# Patient Record
Sex: MALE | Race: WHITE | Employment: OTHER | URBAN - METROPOLITAN AREA
[De-identification: names, ages, dates, MRNs, and addresses within clinical notes are randomized per-mention and may not be internally consistent; named-entity substitution may affect disease eponyms.]

---

## 2019-03-06 ENCOUNTER — HOSPITAL ENCOUNTER (INPATIENT)
Age: 74
LOS: 3 days | Discharge: HOME | DRG: 392 | End: 2019-03-09
Attending: STUDENT IN AN ORGANIZED HEALTH CARE EDUCATION/TRAINING PROGRAM | Admitting: FAMILY MEDICINE
Payer: MEDICARE

## 2019-03-06 DIAGNOSIS — K92.1 MELENA: Primary | ICD-10-CM

## 2019-03-06 DIAGNOSIS — I95.9 HYPOTENSION, UNSPECIFIED HYPOTENSION TYPE: ICD-10-CM

## 2019-03-06 PROBLEM — K92.2 LOWER GI BLEED: Status: ACTIVE | Noted: 2019-03-06

## 2019-03-06 LAB
ALBUMIN SERPL-MCNC: 3.3 G/DL (ref 3.2–4.6)
ALBUMIN/GLOB SERPL: 1 {RATIO} (ref 1.2–3.5)
ALP SERPL-CCNC: 52 U/L (ref 50–136)
ALT SERPL-CCNC: 16 U/L (ref 12–65)
ANION GAP SERPL CALC-SCNC: 8 MMOL/L (ref 7–16)
AST SERPL-CCNC: 22 U/L (ref 15–37)
BASOPHILS # BLD: 0.1 K/UL (ref 0–0.2)
BASOPHILS NFR BLD: 1 % (ref 0–2)
BILIRUB SERPL-MCNC: 0.4 MG/DL (ref 0.2–1.1)
BUN SERPL-MCNC: 22 MG/DL (ref 8–23)
CALCIUM SERPL-MCNC: 8.2 MG/DL (ref 8.3–10.4)
CHLORIDE SERPL-SCNC: 101 MMOL/L (ref 98–107)
CO2 SERPL-SCNC: 26 MMOL/L (ref 21–32)
CREAT SERPL-MCNC: 1.24 MG/DL (ref 0.8–1.5)
DIFFERENTIAL METHOD BLD: ABNORMAL
EOSINOPHIL # BLD: 0 K/UL (ref 0–0.8)
EOSINOPHIL NFR BLD: 0 % (ref 0.5–7.8)
ERYTHROCYTE [DISTWIDTH] IN BLOOD BY AUTOMATED COUNT: 12.7 % (ref 11.9–14.6)
EST. AVERAGE GLUCOSE BLD GHB EST-MCNC: 111 MG/DL
GLOBULIN SER CALC-MCNC: 3.4 G/DL (ref 2.3–3.5)
GLUCOSE SERPL-MCNC: 209 MG/DL (ref 65–100)
HBA1C MFR BLD: 5.5 % (ref 4.8–6)
HCT VFR BLD AUTO: 31.9 % (ref 41.1–50.3)
HCT VFR BLD AUTO: 36.4 % (ref 41.1–50.3)
HGB BLD-MCNC: 10.3 G/DL (ref 13.6–17.2)
HGB BLD-MCNC: 11.7 G/DL (ref 13.6–17.2)
IMM GRANULOCYTES # BLD AUTO: 0.1 K/UL (ref 0–0.5)
IMM GRANULOCYTES NFR BLD AUTO: 1 % (ref 0–5)
INR PPP: 1.1
LYMPHOCYTES # BLD: 1.2 K/UL (ref 0.5–4.6)
LYMPHOCYTES NFR BLD: 10 % (ref 13–44)
MCH RBC QN AUTO: 30.2 PG (ref 26.1–32.9)
MCHC RBC AUTO-ENTMCNC: 32.1 G/DL (ref 31.4–35)
MCV RBC AUTO: 93.8 FL (ref 79.6–97.8)
MONOCYTES # BLD: 0.4 K/UL (ref 0.1–1.3)
MONOCYTES NFR BLD: 4 % (ref 4–12)
NEUTS SEG # BLD: 9.9 K/UL (ref 1.7–8.2)
NEUTS SEG NFR BLD: 85 % (ref 43–78)
NRBC # BLD: 0 K/UL (ref 0–0.2)
PLATELET # BLD AUTO: 189 K/UL (ref 150–450)
PMV BLD AUTO: 10.3 FL (ref 9.4–12.3)
POTASSIUM SERPL-SCNC: 4.3 MMOL/L (ref 3.5–5.1)
PROT SERPL-MCNC: 6.7 G/DL (ref 6.3–8.2)
PROTHROMBIN TIME: 14.4 SEC (ref 11.7–14.5)
RBC # BLD AUTO: 3.88 M/UL (ref 4.23–5.6)
SODIUM SERPL-SCNC: 135 MMOL/L (ref 136–145)
WBC # BLD AUTO: 11.7 K/UL (ref 4.3–11.1)

## 2019-03-06 PROCEDURE — 77030032490 HC SLV COMPR SCD KNE COVD -B

## 2019-03-06 PROCEDURE — 36415 COLL VENOUS BLD VENIPUNCTURE: CPT

## 2019-03-06 PROCEDURE — 74011000250 HC RX REV CODE- 250: Performed by: FAMILY MEDICINE

## 2019-03-06 PROCEDURE — 74011250636 HC RX REV CODE- 250/636: Performed by: FAMILY MEDICINE

## 2019-03-06 PROCEDURE — 86923 COMPATIBILITY TEST ELECTRIC: CPT

## 2019-03-06 PROCEDURE — 85610 PROTHROMBIN TIME: CPT

## 2019-03-06 PROCEDURE — 74011250637 HC RX REV CODE- 250/637: Performed by: FAMILY MEDICINE

## 2019-03-06 PROCEDURE — 85025 COMPLETE CBC W/AUTO DIFF WBC: CPT

## 2019-03-06 PROCEDURE — 80053 COMPREHEN METABOLIC PANEL: CPT

## 2019-03-06 PROCEDURE — 96360 HYDRATION IV INFUSION INIT: CPT | Performed by: STUDENT IN AN ORGANIZED HEALTH CARE EDUCATION/TRAINING PROGRAM

## 2019-03-06 PROCEDURE — 74011250636 HC RX REV CODE- 250/636: Performed by: EMERGENCY MEDICINE

## 2019-03-06 PROCEDURE — 83036 HEMOGLOBIN GLYCOSYLATED A1C: CPT

## 2019-03-06 PROCEDURE — 74011250636 HC RX REV CODE- 250/636: Performed by: STUDENT IN AN ORGANIZED HEALTH CARE EDUCATION/TRAINING PROGRAM

## 2019-03-06 PROCEDURE — 96361 HYDRATE IV INFUSION ADD-ON: CPT | Performed by: STUDENT IN AN ORGANIZED HEALTH CARE EDUCATION/TRAINING PROGRAM

## 2019-03-06 PROCEDURE — 93005 ELECTROCARDIOGRAM TRACING: CPT | Performed by: STUDENT IN AN ORGANIZED HEALTH CARE EDUCATION/TRAINING PROGRAM

## 2019-03-06 PROCEDURE — 86900 BLOOD TYPING SEROLOGIC ABO: CPT

## 2019-03-06 PROCEDURE — 77030020263 HC SOL INJ SOD CL0.9% LFCR 1000ML

## 2019-03-06 PROCEDURE — 65270000029 HC RM PRIVATE

## 2019-03-06 PROCEDURE — C9113 INJ PANTOPRAZOLE SODIUM, VIA: HCPCS | Performed by: FAMILY MEDICINE

## 2019-03-06 PROCEDURE — 85018 HEMOGLOBIN: CPT

## 2019-03-06 PROCEDURE — 99284 EMERGENCY DEPT VISIT MOD MDM: CPT | Performed by: STUDENT IN AN ORGANIZED HEALTH CARE EDUCATION/TRAINING PROGRAM

## 2019-03-06 RX ORDER — SODIUM CHLORIDE 9 MG/ML
100 INJECTION, SOLUTION INTRAVENOUS CONTINUOUS
Status: DISCONTINUED | OUTPATIENT
Start: 2019-03-06 | End: 2019-03-09 | Stop reason: HOSPADM

## 2019-03-06 RX ORDER — ONDANSETRON 2 MG/ML
4 INJECTION INTRAMUSCULAR; INTRAVENOUS
Status: DISCONTINUED | OUTPATIENT
Start: 2019-03-06 | End: 2019-03-09 | Stop reason: HOSPADM

## 2019-03-06 RX ORDER — SODIUM CHLORIDE 0.9 % (FLUSH) 0.9 %
5-40 SYRINGE (ML) INJECTION AS NEEDED
Status: DISCONTINUED | OUTPATIENT
Start: 2019-03-06 | End: 2019-03-09 | Stop reason: HOSPADM

## 2019-03-06 RX ORDER — PRAVASTATIN SODIUM 80 MG/1
80 TABLET ORAL
Status: DISCONTINUED | OUTPATIENT
Start: 2019-03-06 | End: 2019-03-09 | Stop reason: HOSPADM

## 2019-03-06 RX ORDER — SODIUM CHLORIDE 0.9 % (FLUSH) 0.9 %
5-40 SYRINGE (ML) INJECTION EVERY 8 HOURS
Status: DISCONTINUED | OUTPATIENT
Start: 2019-03-06 | End: 2019-03-09 | Stop reason: HOSPADM

## 2019-03-06 RX ORDER — SODIUM CHLORIDE 9 MG/ML
500 INJECTION, SOLUTION INTRAVENOUS ONCE
Status: COMPLETED | OUTPATIENT
Start: 2019-03-06 | End: 2019-03-06

## 2019-03-06 RX ADMIN — SODIUM CHLORIDE 1000 ML: 900 INJECTION, SOLUTION INTRAVENOUS at 16:59

## 2019-03-06 RX ADMIN — SODIUM CHLORIDE 500 ML: 900 INJECTION, SOLUTION INTRAVENOUS at 18:39

## 2019-03-06 RX ADMIN — SODIUM CHLORIDE 100 ML/HR: 900 INJECTION, SOLUTION INTRAVENOUS at 21:00

## 2019-03-06 RX ADMIN — SODIUM CHLORIDE 40 MG: 9 INJECTION, SOLUTION INTRAMUSCULAR; INTRAVENOUS; SUBCUTANEOUS at 22:00

## 2019-03-06 RX ADMIN — Medication 10 ML: at 22:02

## 2019-03-06 RX ADMIN — PRAVASTATIN SODIUM 80 MG: 80 TABLET ORAL at 22:00

## 2019-03-06 NOTE — ED PROVIDER NOTES
75-year-old male patient presents with reports of rectal bleeding. Patient states he had a normal bowel movement this morning, states he developed some weakness shortly thereafter worsening over several hours. He then states he had a large, loose bowel movement that was filled with clot-like material and dark stool. Patient states he had several episodes of similar stooling following this initial episode. He denies syncopal episode but reports lightheadedness and weakness. No significantshortness of breath or pain. He denies previously similar symptoms. Patient reports a history of heart surgery for aortic stenosis, hypertension. He takes an aspirin daily but denies any other blood thinner therapy.              Past Medical History:   Diagnosis Date    Actinic keratosis     Actinic keratosis     CAD (coronary artery disease)     CABG ,bovine valve    Cancer (HCC)     Cellulitis and abscess of leg, except foot     Disorders of magnesium metabolism     Essential hypertension, benign     Heart failure ([address]     MI 2013    High cholesterol     History of prostate cancer     [facility]par[facility], unspecified [facility]     Hypertension     Mixed hyperlipidemia     Neoplasm of uncertain behavior of skin     S/P AVR (aortic valve replacement)     Scabies        Past Surgical History:   Procedure Laterality Date    CARDIAC SURG PROCEDURE UNLIST      HX CORONARY ARTERY BYPASS GRAFT      x4, vein harvest    HX HEENT      partial thyroid     HX HERNIA REPAIR  12/2014    HX KNEE ARTHROSCOPY Left     HX ORTHOPAEDIC      [place] [id number] urology         Family History:   Problem Relation Age of Onset    Diabetes Mother     Heart Disease Father         CVD/athrosclerotic       Social History     Socioeconomic History    Marital status:      Spouse name: Not on file    Number of children: Not on file    Years of education: Not on file    Highest education level: Not on file Social Needs    Financial resource strain: Not on file    Food insecurity - worry: Not on file    Food insecurity - inability: Not on file   [employer] needs - medical: Not on file   [employer] needs - non-medical: Not on file   Occupational History    Not on file   Tobacco Use    Smoking status: Never Smoker    Smokeless tobacco: Current User     Types: Chew    Tobacco comment: 1 pack per 3 weeks   Substance and Sexual Activity    Alcohol use: Yes     Comment: 1-3 beers per year    Drug use: No    Sexual activity: Not on file   Other Topics Concern    Not on file   Social History Narrative    Not on file         ALLERGIES: Pcn [penicillins]    Review of Systems   Constitutional: Negative for chills, diaphoresis and fever. HENT: Negative for congestion, sneezing and sore throat. Eyes: Negative for visual disturbance. Respiratory: Negative for cough, chest tightness, shortness of breath and wheezing. Cardiovascular: Negative for chest pain and leg swelling. Gastrointestinal: Negative for abdominal pain, blood in stool, diarrhea, nausea and vomiting. Endocrine: Negative for polyuria. Genitourinary: Negative for difficulty urinating, dysuria, flank pain, hematuria and urgency. Musculoskeletal: Negative for back pain, myalgias, neck pain and neck stiffness. Skin: Negative for color change and rash. Neurological: Negative for dizziness, syncope, speech difficulty, weakness, light-headedness, numbness and headaches. Psychiatric/Behavioral: Negative for behavioral problems. All other systems reviewed and are negative. Vitals:    03/06/19 1651 03/06/19 1743   BP: (!) 80/51 90/67   Pulse: 84 (!) 50   Resp: 16 16   SpO2: 96% 100%   Height: 5' 9\" (1.753 m)             Physical Exam   Constitutional: He is oriented to person, place, and time. He appears well-developed and well-nourished. No distress. Alert and oriented to person place and time.   No acute distress, speaks in clear, fluid sentences. HENT:   Head: Normocephalic and atraumatic. Right Ear: External ear normal.   Left Ear: External ear normal.   Nose: Nose normal.   Eyes: EOM are normal. Pupils are equal, round, and reactive to light. Neck: Normal range of motion. Cardiovascular: Normal rate, regular rhythm, normal heart sounds and intact distal pulses. Exam reveals no gallop and no friction rub. No murmur heard. Pulmonary/Chest: Effort normal and breath sounds normal. No respiratory distress. He has no wheezes. He has no rales. He exhibits no tenderness. Abdominal: Soft. He exhibits no distension and no mass. There is no tenderness. There is no rebound and no guarding. No hernia. No focal findings. No distention, rebound or guarding. And flat to palpation. Genitourinary:   Genitourinary Comments: Rectal exam reveals obvious melena, Hemoccult-positive, no  visible source of bleeding present. Musculoskeletal: Normal range of motion. He exhibits no edema, tenderness or deformity. Neurological: He is alert and oriented to person, place, and time. No cranial nerve deficit. Skin: Skin is warm and dry. He is not diaphoretic. Nursing note and vitals reviewed. MDM  Number of Diagnoses or Management Options  Hypotension, unspecified hypotension type: new and requires workup  Melena: new and requires workup  Diagnosis management comments: EKG obtained on arrival shows a sinus bradycardia with a rate of 48 beats a minute. No evidence of acute ischemic change. Hemoccult positive on exam, hemoglobin stable on blood draw however will speak with gastroenterology as patient likely requires observation and continued hemoglobin checks with plans for colonoscopy to identify the source of his bleeding. Spoke with on-call GI specialist who requests that I speak with the hospitalist for admission, agrees that patient needs to stay for continued hemoglobin checks and colonoscopy.  7:15 PM  Hospitalist agrees to evaluate for admission 7:19 PM           Amount and/or Complexity of Data Reviewed  Clinical lab tests: ordered and reviewed  Tests in the medicine section of CPT®: ordered and reviewed  Discuss the patient with other providers: yes    Risk of Complications, Morbidity, and/or Mortality  Presenting problems: moderate  Diagnostic procedures: moderate  Management options: moderate    Patient Progress  Patient progress: stable         Procedures

## 2019-03-06 NOTE — ED TRIAGE NOTES
Patient states he started having diarrhea around noon today and it had a large amount of dark red blood in it. Patient states he takes an 81 mg aspirin daily but denies taking any blood thinners.  Patient states he feels like he is weak all over and dizzy

## 2019-03-07 ENCOUNTER — ANESTHESIA EVENT (OUTPATIENT)
Dept: ENDOSCOPY | Age: 74
DRG: 392 | End: 2019-03-07
Payer: MEDICARE

## 2019-03-07 ENCOUNTER — ANESTHESIA (OUTPATIENT)
Dept: ENDOSCOPY | Age: 74
DRG: 392 | End: 2019-03-07
Payer: MEDICARE

## 2019-03-07 LAB
ANION GAP SERPL CALC-SCNC: 5 MMOL/L (ref 7–16)
ATRIAL RATE: 48 BPM
BUN SERPL-MCNC: 14 MG/DL (ref 8–23)
CALCIUM SERPL-MCNC: 7.5 MG/DL (ref 8.3–10.4)
CALCULATED P AXIS, ECG09: 163 DEGREES
CALCULATED R AXIS, ECG10: 96 DEGREES
CALCULATED T AXIS, ECG11: -10 DEGREES
CHLORIDE SERPL-SCNC: 111 MMOL/L (ref 98–107)
CO2 SERPL-SCNC: 27 MMOL/L (ref 21–32)
CREAT SERPL-MCNC: 0.92 MG/DL (ref 0.8–1.5)
DIAGNOSIS, 93000: NORMAL
ERYTHROCYTE [DISTWIDTH] IN BLOOD BY AUTOMATED COUNT: 12.7 % (ref 11.9–14.6)
GLUCOSE SERPL-MCNC: 91 MG/DL (ref 65–100)
HCT VFR BLD AUTO: 25.7 % (ref 41.1–50.3)
HCT VFR BLD AUTO: 26.8 % (ref 41.1–50.3)
HCT VFR BLD AUTO: 26.9 % (ref 41.1–50.3)
HGB BLD-MCNC: 8.1 G/DL (ref 13.6–17.2)
HGB BLD-MCNC: 8.6 G/DL (ref 13.6–17.2)
HGB BLD-MCNC: 8.6 G/DL (ref 13.6–17.2)
MAGNESIUM SERPL-MCNC: 1.8 MG/DL (ref 1.8–2.4)
MCH RBC QN AUTO: 30 PG (ref 26.1–32.9)
MCHC RBC AUTO-ENTMCNC: 32 G/DL (ref 31.4–35)
MCV RBC AUTO: 93.7 FL (ref 79.6–97.8)
NRBC # BLD: 0 K/UL (ref 0–0.2)
P-R INTERVAL, ECG05: 216 MS
PLATELET # BLD AUTO: 130 K/UL (ref 150–450)
PMV BLD AUTO: 10.4 FL (ref 9.4–12.3)
POTASSIUM SERPL-SCNC: 4 MMOL/L (ref 3.5–5.1)
Q-T INTERVAL, ECG07: 482 MS
QRS DURATION, ECG06: 116 MS
QTC CALCULATION (BEZET), ECG08: 430 MS
RBC # BLD AUTO: 2.87 M/UL (ref 4.23–5.6)
SODIUM SERPL-SCNC: 143 MMOL/L (ref 136–145)
VENTRICULAR RATE, ECG03: 48 BPM
WBC # BLD AUTO: 5.9 K/UL (ref 4.3–11.1)

## 2019-03-07 PROCEDURE — 74011250636 HC RX REV CODE- 250/636: Performed by: ANESTHESIOLOGY

## 2019-03-07 PROCEDURE — 74011250636 HC RX REV CODE- 250/636: Performed by: FAMILY MEDICINE

## 2019-03-07 PROCEDURE — 76040000025: Performed by: INTERNAL MEDICINE

## 2019-03-07 PROCEDURE — 74011250636 HC RX REV CODE- 250/636

## 2019-03-07 PROCEDURE — 65660000000 HC RM CCU STEPDOWN

## 2019-03-07 PROCEDURE — 36415 COLL VENOUS BLD VENIPUNCTURE: CPT

## 2019-03-07 PROCEDURE — 74011000250 HC RX REV CODE- 250: Performed by: FAMILY MEDICINE

## 2019-03-07 PROCEDURE — 74011250637 HC RX REV CODE- 250/637: Performed by: INTERNAL MEDICINE

## 2019-03-07 PROCEDURE — C9113 INJ PANTOPRAZOLE SODIUM, VIA: HCPCS | Performed by: FAMILY MEDICINE

## 2019-03-07 PROCEDURE — 77030020263 HC SOL INJ SOD CL0.9% LFCR 1000ML

## 2019-03-07 PROCEDURE — 76060000031 HC ANESTHESIA FIRST 0.5 HR: Performed by: INTERNAL MEDICINE

## 2019-03-07 PROCEDURE — 85018 HEMOGLOBIN: CPT

## 2019-03-07 PROCEDURE — 80048 BASIC METABOLIC PNL TOTAL CA: CPT

## 2019-03-07 PROCEDURE — 83735 ASSAY OF MAGNESIUM: CPT

## 2019-03-07 PROCEDURE — 0DJ08ZZ INSPECTION OF UPPER INTESTINAL TRACT, VIA NATURAL OR ARTIFICIAL OPENING ENDOSCOPIC: ICD-10-PCS | Performed by: INTERNAL MEDICINE

## 2019-03-07 PROCEDURE — 74011250637 HC RX REV CODE- 250/637: Performed by: FAMILY MEDICINE

## 2019-03-07 PROCEDURE — 85027 COMPLETE CBC AUTOMATED: CPT

## 2019-03-07 RX ORDER — PROPOFOL 10 MG/ML
INJECTION, EMULSION INTRAVENOUS AS NEEDED
Status: DISCONTINUED | OUTPATIENT
Start: 2019-03-07 | End: 2019-03-07 | Stop reason: HOSPADM

## 2019-03-07 RX ORDER — SODIUM CHLORIDE, SODIUM LACTATE, POTASSIUM CHLORIDE, CALCIUM CHLORIDE 600; 310; 30; 20 MG/100ML; MG/100ML; MG/100ML; MG/100ML
1000 INJECTION, SOLUTION INTRAVENOUS CONTINUOUS
Status: DISCONTINUED | OUTPATIENT
Start: 2019-03-07 | End: 2019-03-07 | Stop reason: HOSPADM

## 2019-03-07 RX ORDER — LIDOCAINE HYDROCHLORIDE 20 MG/ML
INJECTION, SOLUTION EPIDURAL; INFILTRATION; INTRACAUDAL; PERINEURAL AS NEEDED
Status: DISCONTINUED | OUTPATIENT
Start: 2019-03-07 | End: 2019-03-07 | Stop reason: HOSPADM

## 2019-03-07 RX ORDER — POLYETHYLENE GLYCOL 3350 17 G/17G
238 POWDER, FOR SOLUTION ORAL ONCE
Status: COMPLETED | OUTPATIENT
Start: 2019-03-07 | End: 2019-03-07

## 2019-03-07 RX ORDER — BISACODYL 5 MG
20 TABLET, DELAYED RELEASE (ENTERIC COATED) ORAL ONCE
Status: COMPLETED | OUTPATIENT
Start: 2019-03-07 | End: 2019-03-07

## 2019-03-07 RX ADMIN — SODIUM CHLORIDE 40 MG: 9 INJECTION, SOLUTION INTRAMUSCULAR; INTRAVENOUS; SUBCUTANEOUS at 21:23

## 2019-03-07 RX ADMIN — PROPOFOL 50 MG: 10 INJECTION, EMULSION INTRAVENOUS at 12:49

## 2019-03-07 RX ADMIN — Medication 10 ML: at 06:00

## 2019-03-07 RX ADMIN — PROPOFOL 50 MG: 10 INJECTION, EMULSION INTRAVENOUS at 12:47

## 2019-03-07 RX ADMIN — POLYETHYLENE GLYCOL 3350 238 G: 17 POWDER, FOR SOLUTION ORAL at 18:13

## 2019-03-07 RX ADMIN — Medication 10 ML: at 21:25

## 2019-03-07 RX ADMIN — SODIUM CHLORIDE 40 MG: 9 INJECTION, SOLUTION INTRAMUSCULAR; INTRAVENOUS; SUBCUTANEOUS at 08:35

## 2019-03-07 RX ADMIN — SODIUM CHLORIDE, SODIUM LACTATE, POTASSIUM CHLORIDE, AND CALCIUM CHLORIDE 1000 ML: 600; 310; 30; 20 INJECTION, SOLUTION INTRAVENOUS at 12:39

## 2019-03-07 RX ADMIN — SODIUM CHLORIDE 100 ML/HR: 900 INJECTION, SOLUTION INTRAVENOUS at 11:11

## 2019-03-07 RX ADMIN — SODIUM CHLORIDE 1000 ML: 900 INJECTION, SOLUTION INTRAVENOUS at 04:12

## 2019-03-07 RX ADMIN — SODIUM CHLORIDE 100 ML/HR: 900 INJECTION, SOLUTION INTRAVENOUS at 23:46

## 2019-03-07 RX ADMIN — Medication 10 ML: at 15:13

## 2019-03-07 RX ADMIN — BISACODYL 20 MG: 5 TABLET, COATED ORAL at 15:13

## 2019-03-07 RX ADMIN — LIDOCAINE HYDROCHLORIDE 80 MG: 20 INJECTION, SOLUTION EPIDURAL; INFILTRATION; INTRACAUDAL; PERINEURAL at 12:47

## 2019-03-07 RX ADMIN — PRAVASTATIN SODIUM 80 MG: 80 TABLET ORAL at 21:23

## 2019-03-07 NOTE — ANESTHESIA POSTPROCEDURE EVALUATION
Procedure(s):  ESOPHAGOGASTRODUODENOSCOPY (EGD).     Anesthesia Post Evaluation      Multimodal analgesia: multimodal analgesia not used between 6 hours prior to anesthesia start to PACU discharge  Patient location during evaluation: bedside  Patient participation: complete - patient participated  Level of consciousness: awake and alert  Pain management: adequate  Airway patency: patent  Anesthetic complications: no  Cardiovascular status: hemodynamically stable  Respiratory status: acceptable  Hydration status: acceptable        Visit Vitals  /66   Pulse 61   Temp 37 °C (98.6 °F)   Resp 16   Ht 5' 9\" (1.753 m)   SpO2 96%   BMI 23.18 kg/m²

## 2019-03-07 NOTE — H&P
HOSPITALIST H&P/CONSULT  NAME:  Brooks Herman   Age:  74 y.o.  :   1945   MRN:   [id number]  PCP: Sonido Pinzon  Consulting MD:  Treatment Team: Attending Provider: Peg Speaker; Primary Nurse: Dwight Galeano Primary Nurse: Theodoro Schilder, RN  HPI:   Patient is a 73yo M with hx HTN, HLD, Aortic stenosis s/p bovine valve replacement presents with weakness and large bloody BMs. Pt was seen at PCP 2w prior for n/v, treated symptomatically for viral gastroenteritis and improved fully. Today, pt started with some loose stools and general weakness/fatigue then had a large blood and clots bowel movement. Has had several more loose and bloody BMs since then. Has occasionally passed small amount of BRB, but nothing like this. Has never hat it evaluated, has never had a colonoscopy. No blood thinners, but takes baby asa. No abdominal pain, no sob, no fevers. Complete ROS done and is as stated in HPI or otherwise negative  Past Medical History:   Diagnosis Date    Actinic keratosis     Actinic keratosis     CAD (coronary artery disease)     CABG ,bovine valve    Cancer (HCC)     Cellulitis and abscess of leg, except foot     Disorders of magnesium metabolism     Essential hypertension, benign     Heart failure ([address]     MI     High cholesterol     History of prostate cancer     Hyperparathyroidism, unspecified ([address]     Hypertension     Mixed hyperlipidemia     Neoplasm of uncertain behavior of skin     S/P AVR (aortic valve replacement)     Scabies       Past Surgical History:   Procedure Laterality Date    CARDIAC SURG PROCEDURE UNLIST      HX CORONARY ARTERY BYPASS GRAFT      x4, vein harvest    HX HEENT      partial thyroid     HX HERNIA REPAIR  2014    HX KNEE ARTHROSCOPY Left     HX ORTHOPAEDIC      [facility] urology    reviewed   Prior to Admission Medications   Prescriptions Last Dose Informant Patient Reported? Taking? GLUCOSAMINE/CHONDROITIN SULF A (GLUCOSAMINE-CHONDROITIN PO)   Yes No   Sig: Take 1 capsule by mouth two (2) times a day. Stop seven days prior to surgery per anesthesia protocol. aspirin delayed-release 81 mg tablet   Yes No   Sig: Take 81 mg by mouth every morning. Take day of surgery per anesthesia protocol. calcium 500 mg tab   Yes No   Sig: Take 1,500 mg by mouth two (2) times a day. Stop seven days prior to surgery per anesthesia protocol. carvedilol (COREG) 6.25 mg tablet   No No   Sig: Take 0.5 Tabs by mouth two (2) times a day. The pt states he takes 1/2 pill BID   krill-omega-3-dha-epa-lipids (KRILL OIL) 790-48-94-50 mg cap   Yes No   Sig: Take 1 capsule by mouth every morning. Stop seven days prior to surgery per anesthesia protocol. ondansetron (ZOFRAN ODT) 4 mg disintegrating tablet   No No   Sig: Take 1 Tab by mouth every eight (8) hours as needed for Nausea. pravastatin (PRAVACHOL) 80 mg tablet   No No   Sig: Take 1 Tab by mouth nightly. Indications: hypercholesterolemia, mixed hyperlipidemia   ramipril (ALTACE) 5 mg capsule   No No   Sig: Take 1 Cap by mouth daily.       Facility-Administered Medications: None     Allergies   Allergen Reactions    Pcn [Penicillins] Rash and Other (comments)     Fever, joint pain, dizzy        Social History     Tobacco Use    Smoking status: Never Smoker    Smokeless tobacco: Current User     Types: Chew    Tobacco comment: 1 pack per 3 weeks   Substance Use Topics    Alcohol use: Yes     Comment: 1-3 beers per year      Family History   Problem Relation Age of Onset    Diabetes Mother     Heart Disease Father         CVD/athrosclerotic    reviewed  Objective:     Visit Vitals  BP (!) 89/58   Pulse 63   Temp 97.6 °F (36.4 °C)   Resp (!) 50   Ht 5' 9\" (1.753 m)   SpO2 100%   BMI 23.18 kg/m²      Temp (24hrs), Av.6 °F (36.4 °C), Min:97.6 °F (36.4 °C), Max:97.6 °F (36.4 °C)    Oxygen Therapy  O2 Sat (%): 100 % (19)  Pulse via Oximetry: 61 beats per minute (03/06/19 2003)  O2 Device: Room air (03/06/19 1926)  Physical Exam:  General:    Alert, cooperative, no distress, appears stated age. Head:   Normocephalic, without obvious abnormality, atraumatic. Nose:  Nares normal. No drainage or sinus tenderness. Lungs:   Clear to auscultation bilaterally. No Wheezing or Rhonchi. No rales. Heart:   Borderline lupe, systolic murmur  Abdomen:   Soft, non-tender. Not distended. Bowel sounds hyperactive. Extremities: No cyanosis. No edema. No clubbing  Skin:     Texture, turgor normal. No rashes or lesions. Not Jaundiced  Neurologic: Alert and oriented x 3, no focal deficits   Data Review:   Recent Results (from the past 24 hour(s))   CBC WITH AUTOMATED DIFF    Collection Time: 03/06/19  4:59 PM   Result Value Ref Range    WBC 11.7 (H) 4.3 - 11.1 K/uL    RBC 3.88 (L) 4.23 - 5.6 M/uL    HGB 11.7 (L) 13.6 - 17.2 g/dL    HCT 36.4 (L) 41.1 - 50.3 %    MCV 93.8 79.6 - 97.8 FL    MCH 30.2 26.1 - 32.9 PG    MCHC 32.1 31.4 - 35.0 g/dL    RDW 12.7 11.9 - 14.6 %    PLATELET 944 310 - 468 K/uL    MPV 10.3 9.4 - 12.3 FL    ABSOLUTE NRBC 0.00 0.0 - 0.2 K/uL    DF AUTOMATED      NEUTROPHILS 85 (H) 43 - 78 %    LYMPHOCYTES 10 (L) 13 - 44 %    MONOCYTES 4 4.0 - 12.0 %    EOSINOPHILS 0 (L) 0.5 - 7.8 %    BASOPHILS 1 0.0 - 2.0 %    IMMATURE GRANULOCYTES 1 0.0 - 5.0 %    ABS. NEUTROPHILS 9.9 (H) 1.7 - 8.2 K/UL    ABS. LYMPHOCYTES 1.2 0.5 - 4.6 K/UL    ABS. MONOCYTES 0.4 0.1 - 1.3 K/UL    ABS. EOSINOPHILS 0.0 0.0 - 0.8 K/UL    ABS. BASOPHILS 0.1 0.0 - 0.2 K/UL    ABS. IMM.  GRANS. 0.1 0.0 - 0.5 K/UL   METABOLIC PANEL, COMPREHENSIVE    Collection Time: 03/06/19  4:59 PM   Result Value Ref Range    Sodium 135 (L) 136 - 145 mmol/L    Potassium 4.3 3.5 - 5.1 mmol/L    Chloride 101 98 - 107 mmol/L    CO2 26 21 - 32 mmol/L    Anion gap 8 7 - 16 mmol/L    Glucose 209 (H) 65 - 100 mg/dL    BUN 22 8 - 23 MG/DL    Creatinine 1.24 0.8 - 1.5 MG/DL    GFR est AA >60 >60 ml/min/1.73m2    GFR est non-AA >60 >60 ml/min/1.73m2    Calcium 8.2 (L) 8.3 - 10.4 MG/DL    Bilirubin, total 0.4 0.2 - 1.1 MG/DL    ALT (SGPT) 16 12 - 65 U/L    AST (SGOT) 22 15 - 37 U/L    Alk. phosphatase 52 50 - 136 U/L    Protein, total 6.7 6.3 - 8.2 g/dL    Albumin 3.3 3.2 - 4.6 g/dL    Globulin 3.4 2.3 - 3.5 g/dL    A-G Ratio 1.0 (L) 1.2 - 3.5     TYPE & SCREEN    Collection Time: 03/06/19  4:59 PM   Result Value Ref Range    Crossmatch Expiration 03/09/2019     ABO/Rh(D) A POSITIVE     Antibody screen NEG    EKG, 12 LEAD, INITIAL    Collection Time: 03/06/19  6:36 PM   Result Value Ref Range    Ventricular Rate 48 BPM    Atrial Rate 48 BPM    P-R Interval 216 ms    QRS Duration 116 ms    Q-T Interval 482 ms    QTC Calculation (Bezet) 430 ms    Calculated P Axis 163 degrees    Calculated R Axis 96 degrees    Calculated T Axis -10 degrees    Diagnosis       !! AGE AND GENDER SPECIFIC ECG ANALYSIS !!  Unusual P axis, possible ectopic atrial bradycardia  Rightward axis  Inferior infarct , age undetermined  Cannot rule out Anterior infarct , age undetermined  Abnormal ECG  When compared with ECG of 17-DEC-2001 17:04,  Ectopic atrial rhythm has replaced Sinus rhythm  Minimal criteria for Anterior infarct are now Present  ST now depressed in Inferior leads  T wave inversion now evident in Inferior leads       Imaging /Procedures /Studies   No orders to display       Assessment and Plan: Active Hospital Problems    Diagnosis Date Noted    Lower GI bleed 03/06/2019    Mixed hyperlipidemia     Essential hypertension, benign        PLAN:  Lower GI bleed, never had colonoscopy. HBG down from 13.7 to 11.7. Hypotension improved with fluids. Hold antihypertensives.  NPO after midnight and consult GI for likely colonoscopy in AM. Hold ASA  Glucose 209, check A1c      DVT PPx: SCD  Code Status: DNR    Anticipated discharge: 3-4d    Signed By: Thiago Willis MD     March 6, 2019

## 2019-03-07 NOTE — PROGRESS NOTES
03/06/19 2054   Dual Skin Pressure Injury Assessment   Dual Skin Pressure Injury Assessment WDL   Second Care Provider (Based on Facility Policy) Alexia  RN   Skin Integumentary   Skin Integumentary (WDL) WDL   Skin Color Appropriate for ethnicity   Skin Condition/Temp Dry; Warm   Skin Integrity Tattoos (comment)  (Rt shoulder)   Turgor Non-tenting   Hair Growth Present   Varicosities Absent   Musculoskeletal   Musculoskeletal (WDL) WDL

## 2019-03-07 NOTE — PROGRESS NOTES
Patient is a 74year old white male who is currently admitted under inpatient status due to melena. GI has been consulted. Pending EGD today. Currently being monitored for H&H. CM met with patient and spouse in room regarding discharge planning. SOCIAL:  Patient and spouse live in a 1 level house with 3 step entry and shower/tub. 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  His wife also works part time cleaning houses. His 2 children and their spouses live nearby and all work full time. He is not a . His PCP is confirmed as Dr. Nathen Henriquez. Last visit 2 weeks ago, per H&P. His insurance is confirmed as JAYS. His prescriptions are typically affordable, per patient and spouse. Patient's POA is his wife. Documents on file. Source of income: employed / social security  No reported history of mental health or substance abuse issues. MOBILITY / HISTORY:  At baseline, patient is very active. He reports exercising 3 days per week. He ambulates without devices. He manages ADLs with independence. He drives himself. Family also available to transport if needed. DME - cane, crutches, BSC  No home oxygen. No dialysis history. No privately paid sitters, aids, caregivers, or CLTC hours. No STR history[facility] after [facility] procedure. PLAN FOR DISCHARGE:  Anticipate patient will discharg to home when medically ready. No needs identified. Strong family support available to patient. [place] will continue to follow for any potential needs during this admission. Care Management Interventions  PCP Verified by CM: Yes(Dr. Niesha Velez)  Last Visit to PCP: 02/20/19  Mode of Transport at Discharge:  Other (see comment)(wife)  Transition of Care Consult (CM Consult): Discharge Planning  Discharge Durable Medical Equipment: No(BSC, cane, and crutches at home.)  Physical Therapy Consult: No  Occupational Therapy Consult: No  Speech Therapy Consult: No  Current Support Network: Own Home, Lives with Spouse(Lives with wife. 2 children and their spouses live locally. All work.)  Confirm Follow Up Transport: Family(Patient typically drives himself.  Family also available if needed.)  Plan discussed with Pt/Family/Caregiver: Yes(Met with patient and spouse.)  Freedom of Choice Offered: Yes  Discharge Location  Discharge Placement: Home

## 2019-03-07 NOTE — PROGRESS NOTES
TRANSFER - IN REPORT:    Verbal report received from [facility] RN(name) on [address]  being received from 720(unit) for ordered procedure      Report consisted of patients Situation, Background, Assessment and   Recommendations(SBAR). Information from the following report(s) SBAR and MAR was reviewed with the receiving nurse. Opportunity for questions and clarification was provided. Nurse confirming with patient about no blood products.

## 2019-03-07 NOTE — ROUTINE PROCESS
Notified Dr. Denver Gower and Dr. Morgan Cox of pt blood pressure of 80/55 with MAP 63. O2 96. Patient alert and oriented and asymptomatic. 1L of NS 250ml/hr ordered. Will continue to monitor.

## 2019-03-07 NOTE — ED NOTES
TRANSFER - OUT REPORT:    Verbal report given to Toribio(name) on [address]  being transferred to [facility](unit) for routine progression of care       Report consisted of patients Situation, Background, Assessment and   Recommendations(SBAR). Information from the following report(s) SBAR was reviewed with the receiving nurse. Lines:   Peripheral IV 03/06/19 Left Antecubital (Active)   Site Assessment Clean, dry, & intact 3/6/2019  7:28 PM   Phlebitis Assessment 0 3/6/2019  7:28 PM   Infiltration Assessment 0 3/6/2019  7:28 PM   Dressing Status Clean, dry, & intact 3/6/2019  7:28 PM        Opportunity for questions and clarification was provided.       Patient transported with:   [employer]

## 2019-03-07 NOTE — H&P (VIEW-ONLY)
[facility] Consult Note         Referring Provider:  Dr Arvizu Current Date:  3/7/2019    Admit Date:  3/6/2019    Chief Complaint:      Subjective:     History of Present Illness:  Patient is a 74 y.o. male with PMH of CAD (CABG 2013) HTN, HLD, Aortic stenosis s/p bovine valve replacement and prostate cancer seen for dark stool and anemia with hgb drop from 13.7 to 8.6 with no elevation in BUN. Patient reports 2 weeks ago was seen by PCP for N/V and treated for viral gastroenteritis and improved fully. Reported loose stools yesterday with a large amount of dark and clots. Several loose dark/bloody BMs followed. No prior colonoscopy or EGD. Denies hx of PUD or prior GIB. No recent antibiotics or NSAID's. Reports rare whiskey for sore throat. Denies family hx of GI malignancy. Denies abd pain but reports lower abd discomfort. Denies N/V. Last loose stool was yesterday. PMH:  Past Medical History:   Diagnosis Date    Actinic keratosis     Actinic keratosis     CAD (coronary artery disease)     CABG ,bovine valve    Cancer (HCC)     Cellulitis and abscess of leg, except foot     Disorders of magnesium metabolism     Essential hypertension, benign     Heart failure ([address]     MI 2013    High cholesterol     History of prostate cancer     Hyperparathyroidism, unspecified ([address]     Hypertension     Mixed hyperlipidemia     Neoplasm of uncertain behavior of skin     S/P AVR (aortic valve replacement)     Scabies        PSH:  Past Surgical History:   Procedure Laterality Date    CARDIAC SURG PROCEDURE UNLIST      HX CORONARY ARTERY BYPASS GRAFT      x4, vein harvest    HX HEENT      partial thyroid     HX HERNIA REPAIR  12/2014    HX KNEE ARTHROSCOPY Left     HX ORTHOPAEDIC      [place] [id number] urology       Allergies:   Allergies   Allergen Reactions    Pcn [Penicillins] Rash and Other (comments)     Fever, joint pain, dizzy         Home Medications:  Prior to Admission medications    Medication Sig Start Date End Date Taking? Authorizing Provider   ondansetron (ZOFRAN ODT) 4 mg disintegrating tablet Take 1 Tab by mouth every eight (8) hours as needed for Nausea. 2/20/19   Kamran Kang NP   pravastatin (PRAVACHOL) 80 mg tablet Take 1 Tab by mouth nightly. Indications: hypercholesterolemia, mixed hyperlipidemia 10/11/18   Waynard Plate C, DO   carvedilol (COREG) 6.25 mg tablet Take 0.5 Tabs by mouth two (2) times a day. The pt states he takes 1/2 pill BID 4/12/18   Waynard Plate C, DO   ramipril (ALTACE) 5 mg capsule Take 1 Cap by mouth daily. 4/13/17   Kamran Kang NP   gylgs-nvnca-2-dha-epa-lipids (KRILL OIL) 956-39-16-94 mg cap Take 1 capsule by mouth every morning. Stop seven days prior to surgery per anesthesia protocol. Provider, Historical   calcium 500 mg tab Take 1,500 mg by mouth two (2) times a day. Stop seven days prior to surgery per anesthesia protocol. Provider, Historical   aspirin delayed-release 81 mg tablet Take 81 mg by mouth every morning. Take day of surgery per anesthesia protocol. Provider, Historical   GLUCOSAMINE/CHONDROITIN SULF A (GLUCOSAMINE-CHONDROITIN PO) Take 1 capsule by mouth two (2) times a day. Stop seven days prior to surgery per anesthesia protocol.     Provider, Historical       Hospital Medications:  Current Facility-Administered Medications   Medication Dose Route Frequency    sodium chloride 0.9 % bolus infusion 1,000 mL  1,000 mL IntraVENous ONCE    pravastatin (PRAVACHOL) tablet 80 mg  80 mg Oral QHS    sodium chloride (NS) flush 5-40 mL  5-40 mL IntraVENous Q8H    sodium chloride (NS) flush 5-40 mL  5-40 mL IntraVENous PRN    ondansetron (ZOFRAN) injection 4 mg  4 mg IntraVENous Q4H PRN    0.9% sodium chloride infusion  100 mL/hr IntraVENous CONTINUOUS    pantoprazole (PROTONIX) 40 mg in sodium chloride 0.9% 10 mL injection  40 mg IntraVENous Q12H       Social History:  Social History     Tobacco Use    Smoking status: Never Smoker    Smokeless tobacco: Current User     Types: Chew    Tobacco comment: 1 pack per 3 weeks   Substance Use Topics    Alcohol use: Yes     Comment: 1-3 beers per year       Pt denies any history of drug use, blood transfusions, or tattoos. Family History:  Family History   Problem Relation Age of Onset    Diabetes Mother     Heart Disease Father         CVD/athrosclerotic       Review of Systems:  A detailed 10 system ROS is obtained, with pertinent positives as listed above. All others are negative. Diet:      Objective:     Physical Exam:  Vitals:  Visit Vitals  BP 94/64 (BP 1 Location: Right arm, BP Patient Position: At rest;Supine)   Pulse 67   Temp 98.9 °F (37.2 °C)   Resp 20   Ht 5' 9\" (1.753 m)   SpO2 96%   BMI 23.18 kg/m²     Gen:  Pt is alert, cooperative, no acute distress  Skin:  Extremities and face reveal no rashes. HEENT: Sclerae anicteric. Extra-occular muscles are intact. No oral ulcers. No abnormal pigmentation of the lips. The neck is supple. Cardiovascular: Regular rate and rhythm. No murmurs, gallops, or rubs. Respiratory:  Comfortable breathing with no accessory muscle use. Clear breath sounds anteriorly with no wheezes, rales, or rhonchi. GI:  Abdomen nondistended, soft, and nontender. Normal active bowel sounds. No enlargement of the liver or spleen. No masses palpable. Rectal:  Deferred  Musculoskeletal:  No pitting edema of the lower legs. Neurological:  Gross memory appears intact. Patient is alert and oriented. Psychiatric:  Mood appears appropriate with judgement intact. Lymphatic:  No cervical or supraclavicular adenopathy.     Laboratory:    Recent Labs     03/07/19  0452 03/06/19  2108 03/06/19  1659   WBC 5.9  --  11.7*   HGB 8.6* 10.3* 11.7*   HCT 26.9* 31.9* 36.4*   *  --  189   MCV 93.7  --  93.8     --  135*   K 4.0  --  4.3   *  --  101   CO2 27  --  26   BUN 14 --  22   CREA 0.92  --  1.24   CA 7.5*  --  8.2*   GLU 91  --  209*   AP  --   --  52   SGOT  --   --  22   ALT  --   --  16   TBILI  --   --  0.4   ALB  --   --  3.3   TP  --   --  6.7   PTP  --  14.4  --    INR  --  1.1  --           Assessment:     Principal Problem:    Lower GI bleed (3/6/2019)    Active Problems:    Mixed hyperlipidemia ()      Essential hypertension, benign ()      74 y.o. male with PMH of CAD (CABG 2013) HTN, HLD, Aortic stenosis s/p bovine valve replacement and prostate cancer admitted w dark stool and anemia with hgb drop from 13.7 to 8.6 with no elevation in BUN. Reports loose stool yesterday that were dark but also reported to other providers \"bloody\". Etiologies for his GIB could include UGIB sources such as PUD, AVMs or lower sources such as mass, lesion, diverticular bleed. No blood thinners other than ASA 81 mg.      Plan:     - Monitor H&H and transfuse for hgb 8-9  - Continue NPO for EGD later today to evaluate for UGIB sources  - May need colo either in or out patient to evaluate for lower GIB sources as well as for screening pending findings of EGD  - Monitor for signs of GIB (tarry stool, BRRB)  - Continue PPI BID    Yumiko Hernandez NP  Patient is seen and examined in collaboration with Dr. Radha Shaw. Assessment and plan as per Dr. iWlla Perdomo.

## 2019-03-07 NOTE — H&P
[facility]ology Outpatient History and Physical    Patient: Yessy Schneiderald    Physician: Greer Bradford MD    Vital Signs: Blood pressure 93/63, pulse (!) 55, temperature 98.6 °F (37 °C), resp. rate 20, height 5' 9\" (1.753 m), SpO2 96 %. Allergies:    Allergies   Allergen Reactions    Pcn [Penicillins] Rash and Other (comments)     Fever, joint pain, dizzy         Chief Complaint: Anemia, GIB    History of Present Illness: As Above    Justification for Procedure: As Above    History:  Past Medical History:   Diagnosis Date    Actinic keratosis     Actinic keratosis     CAD (coronary artery disease)     CABG ,bovine valve    Cancer ([facility])     Cellulitis and abscess of leg, except foot     Disorders of magnesium metabolism     Essential hypertension, benign     Heart failure ([facility])     MI 2013    High cholesterol     History of prostate cancer     Hyperparathyroidism, unspecified ([facility])     Hypertension     Mixed hyperlipidemia     Neoplasm of uncertain behavior of skin     S/P AVR (aortic valve replacement)     Scabies       Past Surgical History:   Procedure Laterality Date    CARDIAC SURG PROCEDURE UNLIST      HX CORONARY ARTERY BYPASS GRAFT      x4, vein harvest    HX HEENT      partial thyroid     HX HERNIA REPAIR  12/2014    HX KNEE ARTHROSCOPY Left     HX ORTHOPAEDIC      [address]      Social History     Socioeconomic History    Marital status:      Spouse name: Not on file    Number of children: Not on file    Years of education: Not on file    Highest education level: Not on file   Tobacco Use    Smoking status: Never Smoker    Smokeless tobacco: Current User     Types: Chew    Tobacco comment: 1 pack per 3 weeks   Substance and Sexual Activity    Alcohol use: Yes     Comment: 1-3 beers per year    Drug use: No      Family History   Problem Relation Age of Onset    Diabetes Mother     Heart Disease Father         CVD/athrosclerotic Medications:   Prior to Admission medications    Medication Sig Start Date End Date Taking? Authorizing Provider   ondansetron (ZOFRAN ODT) 4 mg disintegrating tablet Take 1 Tab by mouth every eight (8) hours as needed for Nausea. 2/20/19   Valentino Links, NP   pravastatin (PRAVACHOL) 80 mg tablet Take 1 Tab by mouth nightly. Indications: hypercholesterolemia, mixed hyperlipidemia 10/11/18   Melvenia Jungling C, DO   carvedilol (COREG) 6.25 mg tablet Take 0.5 Tabs by mouth two (2) times a day. The pt states he takes 1/2 pill BID 4/12/18   Melvenia Jungling C, DO   ramipril (ALTACE) 5 mg capsule Take 1 Cap by mouth daily. 4/13/17   Valentino Links, NP   sbnuq-cyvbw-4-dha-epa-lipids (KRILL OIL) 322-25-14-43 mg cap Take 1 capsule by mouth every morning. Stop seven days prior to surgery per anesthesia protocol. Provider, Historical   calcium 500 mg tab Take 1,500 mg by mouth two (2) times a day. Stop seven days prior to surgery per anesthesia protocol. Provider, Historical   aspirin delayed-release 81 mg tablet Take 81 mg by mouth every morning. Take day of surgery per anesthesia protocol. Provider, Historical   GLUCOSAMINE/CHONDROITIN SULF A (GLUCOSAMINE-CHONDROITIN PO) Take 1 capsule by mouth two (2) times a day. Stop seven days prior to surgery per anesthesia protocol.     Provider, Historical       Physical Exam:         Heart: regular rate and rhythm, S1, S2 normal, no murmur, click, rub or gallop   Lungs: chest clear, no wheezing, rales, normal symmetric air entry, Heart exam - S1, S2 normal, no murmur, no gallop, rate regular   Abdominal: Bowel sounds are normal, Bowel sounds active, liver is not enlarged, spleen is not enlarged           Findings/Diagnosis: Anemia, GIB    EGD        Signed:  Yobani Ramos MD 3/7/2019

## 2019-03-07 NOTE — CONSULTS
[facility] Consult Note         Referring Provider:  Dr Max Reis Date:  3/7/2019    Admit Date:  3/6/2019    Chief Complaint:      Subjective:     History of Present Illness:  Patient is a 74 y.o. male with PMH of CAD (CABG 2013) HTN, HLD, Aortic stenosis s/p bovine valve replacement and prostate cancer seen for dark stool and anemia with hgb drop from 13.7 to 8.6 with no elevation in BUN. Patient reports 2 weeks ago was seen by PCP for N/V and treated for viral gastroenteritis and improved fully. Reported loose stools yesterday with a large amount of dark and clots. Several loose dark/bloody BMs followed. No prior colonoscopy or EGD. Denies hx of PUD or prior GIB. No recent antibiotics or NSAID's. Reports rare whiskey for sore throat. Denies family hx of GI malignancy. Denies abd pain but reports lower abd discomfort. Denies N/V. Last loose stool was yesterday. PMH:  Past Medical History:   Diagnosis Date    Actinic keratosis     Actinic keratosis     CAD (coronary artery disease)     CABG ,bovine valve    Cancer (HCC)     Cellulitis and abscess of leg, except foot     Disorders of magnesium metabolism     Essential hypertension, benign     Heart failure ([address]     MI 2013    High cholesterol     History of prostate cancer     Hyperparathyroidism, unspecified ([address]     Hypertension     Mixed hyperlipidemia     Neoplasm of uncertain behavior of skin     S/P AVR (aortic valve replacement)     Scabies        PSH:  Past Surgical History:   Procedure Laterality Date    CARDIAC SURG PROCEDURE UNLIST      HX CORONARY ARTERY BYPASS GRAFT      x4, vein harvest    HX HEENT      partial thyroid     HX HERNIA REPAIR  12/2014    HX KNEE ARTHROSCOPY Left     HX ORTHOPAEDIC      [place] [id number] urology       Allergies:   Allergies   Allergen Reactions    Pcn [Penicillins] Rash and Other (comments)     Fever, joint pain, dizzy         Home Medications:  Prior to Admission medications    Medication Sig Start Date End Date Taking? Authorizing Provider   ondansetron (ZOFRAN ODT) 4 mg disintegrating tablet Take 1 Tab by mouth every eight (8) hours as needed for Nausea. 2/20/19   Christa Chamberlain NP   pravastatin (PRAVACHOL) 80 mg tablet Take 1 Tab by mouth nightly. Indications: hypercholesterolemia, mixed hyperlipidemia 10/11/18   Clifton Piles C, DO   carvedilol (COREG) 6.25 mg tablet Take 0.5 Tabs by mouth two (2) times a day. The pt states he takes 1/2 pill BID 4/12/18   Clifton Piles C, DO   ramipril (ALTACE) 5 mg capsule Take 1 Cap by mouth daily. 4/13/17   Christa Chamberlain NP   yadws-xjddk-9-dha-epa-lipids (KRILL OIL) 039-43-34-47 mg cap Take 1 capsule by mouth every morning. Stop seven days prior to surgery per anesthesia protocol. Provider, Historical   calcium 500 mg tab Take 1,500 mg by mouth two (2) times a day. Stop seven days prior to surgery per anesthesia protocol. Provider, Historical   aspirin delayed-release 81 mg tablet Take 81 mg by mouth every morning. Take day of surgery per anesthesia protocol. Provider, Historical   GLUCOSAMINE/CHONDROITIN SULF A (GLUCOSAMINE-CHONDROITIN PO) Take 1 capsule by mouth two (2) times a day. Stop seven days prior to surgery per anesthesia protocol.     Provider, Historical       Hospital Medications:  Current Facility-Administered Medications   Medication Dose Route Frequency    sodium chloride 0.9 % bolus infusion 1,000 mL  1,000 mL IntraVENous ONCE    pravastatin (PRAVACHOL) tablet 80 mg  80 mg Oral QHS    sodium chloride (NS) flush 5-40 mL  5-40 mL IntraVENous Q8H    sodium chloride (NS) flush 5-40 mL  5-40 mL IntraVENous PRN    ondansetron (ZOFRAN) injection 4 mg  4 mg IntraVENous Q4H PRN    0.9% sodium chloride infusion  100 mL/hr IntraVENous CONTINUOUS    pantoprazole (PROTONIX) 40 mg in sodium chloride 0.9% 10 mL injection  40 mg IntraVENous Q12H       Social History:  Social History     Tobacco Use    Smoking status: Never Smoker    Smokeless tobacco: Current User     Types: Chew    Tobacco comment: 1 pack per 3 weeks   Substance Use Topics    Alcohol use: Yes     Comment: 1-3 beers per year       Pt denies any history of drug use, blood transfusions, or tattoos. Family History:  Family History   Problem Relation Age of Onset    Diabetes Mother     Heart Disease Father         CVD/athrosclerotic       Review of Systems:  A detailed 10 system ROS is obtained, with pertinent positives as listed above. All others are negative. Diet:      Objective:     Physical Exam:  Vitals:  Visit Vitals  BP 94/64 (BP 1 Location: Right arm, BP Patient Position: At rest;Supine)   Pulse 67   Temp 98.9 °F (37.2 °C)   Resp 20   Ht 5' 9\" (1.753 m)   SpO2 96%   BMI 23.18 kg/m²     Gen:  Pt is alert, cooperative, no acute distress  Skin:  Extremities and face reveal no rashes. HEENT: Sclerae anicteric. Extra-occular muscles are intact. No oral ulcers. No abnormal pigmentation of the lips. The neck is supple. Cardiovascular: Regular rate and rhythm. No murmurs, gallops, or rubs. Respiratory:  Comfortable breathing with no accessory muscle use. Clear breath sounds anteriorly with no wheezes, rales, or rhonchi. GI:  Abdomen nondistended, soft, and nontender. Normal active bowel sounds. No enlargement of the liver or spleen. No masses palpable. Rectal:  Deferred  Musculoskeletal:  No pitting edema of the lower legs. Neurological:  Gross memory appears intact. Patient is alert and oriented. Psychiatric:  Mood appears appropriate with judgement intact. Lymphatic:  No cervical or supraclavicular adenopathy.     Laboratory:    Recent Labs     03/07/19  0452 03/06/19  2108 03/06/19  1659   WBC 5.9  --  11.7*   HGB 8.6* 10.3* 11.7*   HCT 26.9* 31.9* 36.4*   *  --  189   MCV 93.7  --  93.8     --  135*   K 4.0  --  4.3   *  --  101   CO2 27  --  26   BUN 14 --  22   CREA 0.92  --  1.24   CA 7.5*  --  8.2*   GLU 91  --  209*   AP  --   --  52   SGOT  --   --  22   ALT  --   --  16   TBILI  --   --  0.4   ALB  --   --  3.3   TP  --   --  6.7   PTP  --  14.4  --    INR  --  1.1  --           Assessment:     Principal Problem:    Lower GI bleed (3/6/2019)    Active Problems:    Mixed hyperlipidemia ()      Essential hypertension, benign ()      74 y.o. male with PMH of CAD (CABG 2013) HTN, HLD, Aortic stenosis s/p bovine valve replacement and prostate cancer admitted w dark stool and anemia with hgb drop from 13.7 to 8.6 with no elevation in BUN. Reports loose stool yesterday that were dark but also reported to other providers \"bloody\". Etiologies for his GIB could include UGIB sources such as PUD, AVMs or lower sources such as mass, lesion, diverticular bleed. No blood thinners other than ASA 81 mg.      Plan:     - Monitor H&H and transfuse for hgb 8-9  - Continue NPO for EGD later today to evaluate for UGIB sources  - May need colo either in or out patient to evaluate for lower GIB sources as well as for screening pending findings of EGD  - Monitor for signs of GIB (tarry stool, BRRB)  - Continue PPI BID    Spero Alden, NP  Patient is seen and examined in collaboration with Dr. Najma Browne. Assessment and plan as per Dr. Evens Salguero.

## 2019-03-07 NOTE — PROGRESS NOTES
TRANSFER - OUT REPORT:    Verbal report given to Sofia(name) on [address]  being transferred to [facility](unit) for routine post - op       Report consisted of patients Situation, Background, Assessment and   Recommendations(SBAR). Information from the following report(s) Procedure Summary was reviewed with the receiving nurse. Lines:   Peripheral IV 03/06/19 Left Antecubital (Active)   Site Assessment Clean, dry, & intact 3/7/2019  8:36 AM   Phlebitis Assessment 0 3/7/2019  8:36 AM   Infiltration Assessment 0 3/7/2019  8:36 AM   Dressing Status Clean, dry, & intact 3/7/2019  8:36 AM   Dressing Type Tape;Transparent 3/7/2019  8:36 AM   Hub Color/Line Status Pink; Infusing 3/7/2019  8:36 AM   Alcohol Cap Used No 3/7/2019  8:36 AM        Opportunity for questions and clarification was provided.       Patient transported with:

## 2019-03-07 NOTE — ROUTINE PROCESS
TRANSFER - IN REPORT:    Verbal report received from Nikki(name) on [address]  being received from ED(unit) for routine progression of care      Report consisted of patients Situation, Background, Assessment and   Recommendations(SBAR). Information from the following report(s) SBAR and ED Summary was reviewed with the receiving nurse. Opportunity for questions and clarification was provided. Assessment completed upon patients arrival to unit and care assumed.

## 2019-03-07 NOTE — ANESTHESIA PREPROCEDURE EVALUATION
Anesthetic History   No history of anesthetic complications            Review of Systems / Medical History      Pulmonary  Within defined limits                 Neuro/Psych              Cardiovascular    Hypertension          CAD and CABG    Exercise tolerance: >4 METS  Comments: CABG/AVR in 2012   GI/Hepatic/Renal                Endo/Other        Anemia     Other Findings   Comments: H/O partial parathyroidectomy           Physical Exam    Airway  Mallampati: I  TM Distance: 4 - 6 cm  Neck ROM: normal range of motion   Mouth opening: Normal     Cardiovascular    Rhythm: regular      Murmur: Grade 4, Aortic area     Dental  No notable dental hx       Pulmonary  Breath sounds clear to auscultation               Abdominal         Other Findings            Anesthetic Plan    ASA: 3  Anesthesia type: total IV anesthesia          Induction: Intravenous  Anesthetic plan and risks discussed with: Patient

## 2019-03-07 NOTE — DISCHARGE INSTRUCTIONS
Gastrointestinal Esophagogastroduodenoscopy (EGD) - Upper Exam Discharge Instructions    1. Call Dr. Brenna Turpin at [phone] for any problems or questions. 2. Contact the doctor's office for follow up appointment as directed. 3. Medication may cause drowsiness for several hours, therefore, do not drive or operate machinery for remainder of the day. 4. No alcohol today. 5. Ordinarily, you may resume regular diet and activity after exam unless otherwise specified by your physician. 6. For mild soreness in your throat you may use Cepacol throat lozenges or warm salt-water gargles as needed. Any additional instructions: Instructions given to Rylie Mena and other family members. Patient Education   Report to physician: back and tarry stools, vomiting coffee ground material,  extreme dizziness/fatigue, shortness of breath, or feeling faint    Go to emergency room if you are vomiting bright red blood or having bright red blood in stools     Avoid over the counter antiinflammatory medications : Motrin, Ibuprofen, Aleve      Gastrointestinal Bleeding: Care Instructions  Your Care Instructions    The digestive or gastrointestinal tract goes from the mouth to the anus. It is often called the GI tract. Bleeding can happen anywhere in the GI tract. It may be caused by an ulcer, an infection, or cancer. It may also be caused by medicines such as aspirin or ibuprofen. Light bleeding may not cause any symptoms at first. But if you continue to bleed for a while, you may feel very weak or tired. Sudden, heavy bleeding means you need to see a doctor right away. This kind of bleeding can be very dangerous. But it can usually be cured or controlled. The doctor may do some tests to find the cause of your bleeding. Follow-up care is a key part of your treatment and safety. Be sure to make and go to all appointments, and call your doctor if you are having problems.  It's also a good idea to know your test results and keep a list of the medicines you take. How can you care for yourself at home? · Be safe with medicines. Take your medicines exactly as prescribed. Call your doctor if you think you are having a problem with your medicine. You will get more details on the specific medicines your doctor prescribes. · Do not take aspirin or other anti-inflammatory medicines, such as naproxen (Aleve) or ibuprofen (Advil, Motrin), without talking to your doctor first. Ask your doctor if it is okay to use acetaminophen (Tylenol). · Do not drink alcohol. · The bleeding may make you lose iron. So it's important to eat foods that have a lot of iron. These include red meat, shellfish, poultry, and eggs. They also include beans, raisins, whole-grain breads, and leafy green vegetables. If you want help planning meals, you can make an appointment with a dietitian. When should you call for help? Call 911 anytime you think you may need emergency care. For example, call if:    · You have sudden, severe belly pain.     · You vomit blood or what looks like coffee grounds.     · You passed out (lost consciousness).     · Your stools are maroon or very bloody.    Call your doctor now or seek immediate medical care if:    · You are dizzy or lightheaded, or you feel like you may faint.     · Your stools are black and look like tar, or they have streaks of blood.     · You have belly pain.     · You vomit or have nausea.     · You have trouble swallowing, or it hurts when you swallow.    Watch closely for changes in your health, and be sure to contact your doctor if:    · You do not get better as expected. Where can you learn more? Go to http://to-michelle.info/. Enter K408 in the search box to learn more about \"Gastrointestinal Bleeding: Care Instructions. \"  Current as of: September 23, 2018  Content Version: 11.9  © 9134-0767 [employer].  Care instructions adapted under license by [employer] (which disclaims liability or warranty for this information). If you have questions about a medical condition or this instruction, always ask your healthcare professional. [address] any warranty or liability for your use of this information. DISCHARGE SUMMARY from Nurse    PATIENT INSTRUCTIONS:    After general anesthesia or intravenous sedation, for 24 hours or while taking prescription Narcotics:  · Limit your activities  · Do not drive and operate hazardous machinery  · Do not make important personal or business decisions  · Do  not drink alcoholic beverages  · If you have not urinated within 8 hours after discharge, please contact your surgeon on call. Report the following to your surgeon:  · Excessive pain, swelling, redness or odor of or around the surgical area  · Temperature over 100.5  · Nausea and vomiting lasting longer than 4 hours or if unable to take medications  · Any signs of decreased circulation or nerve impairment to extremity: change in color, persistent  numbness, tingling, coldness or increase pain  · Any questions    What to do at Home:  Recommended activity: Activity as tolerated,     If you experience any of the following symptoms see discharge instructions, please follow up with primary care. *  Please give a list of your current medications to your Primary Care Provider. *  Please update this list whenever your medications are discontinued, doses are      changed, or new medications (including over-the-counter products) are added. *  Please carry medication information at all times in case of emergency situations. These are general instructions for a healthy lifestyle:    No smoking/ No tobacco products/ Avoid exposure to second hand smoke  Surgeon General's Warning:  Quitting smoking now greatly reduces serious risk to your health.     Obesity, smoking, and sedentary lifestyle greatly increases your risk for illness    A healthy diet, regular physical exercise & weight monitoring are important for maintaining a healthy lifestyle    You may be retaining fluid if you have a history of heart failure or if you experience any of the following symptoms:  Weight gain of 3 pounds or more overnight or 5 pounds in a week, increased swelling in our hands or feet or shortness of breath while lying flat in bed. Please call your doctor as soon as you notice any of these symptoms; do not wait until your next office visit. Recognize signs and symptoms of STROKE:    F-face looks uneven    A-arms unable to move or move unevenly    S-speech slurred or non-existent    T-time-call 911 as soon as signs and symptoms begin-DO NOT go       Back to bed or wait to see if you get better-TIME IS BRAIN. Warning Signs of HEART ATTACK     Call 911 if you have these symptoms:   Chest discomfort. Most heart attacks involve discomfort in the center of the chest that lasts more than a few minutes, or that goes away and comes back. It can feel like uncomfortable pressure, squeezing, fullness, or pain.  Discomfort in other areas of the upper body. Symptoms can include pain or discomfort in one or both arms, the back, neck, jaw, or stomach.  Shortness of breath with or without chest discomfort.  Other signs may include breaking out in a cold sweat, nausea, or lightheadedness. Don't wait more than five minutes to call 911 - MINUTES MATTER! Fast action can save your life. Calling 911 is almost always the fastest way to get lifesaving treatment. Emergency Medical Services staff can begin treatment when they arrive -- up to an hour sooner than if someone gets to the hospital by car. The discharge information has been reviewed with the patient. The patient verbalized understanding.   Discharge medications reviewed with the patient and appropriate educational materials and side effects teaching were provided.   ___________________________________________________________________________________________________________________________________

## 2019-03-07 NOTE — PROCEDURES
Esophagogastroduodenoscopy    DATE of PROCEDURE: 3/7/2019    INDICATION:  1. GI bleed    POSTPROCEDURE DIAGNOSIS:  1. Hiatal hernia  2. Duodenal diverticulum    MEDICATIONS ADMINISTERED: MAC. Further details per anesthesia note. INSTRUMENT: [id number]    PROCEDURE:  After obtaining informed consent, the patient was placed in the left lateral position and sedated. The endoscope was advanced under direct vision without difficulty. The esophagus, stomach (including retroflexed views) and duodenum were evaluated. The patient was taken to the recovery area in stable condition. FINDINGS:  ESOPHAGUS: Small hiatal hernia. Otherwise normal exam.  STOMACH: Normal exam.  DUODENUM: Duodenal diverticulum. Otherwise normal exam.    Estimated blood loss: 0-minimal   Specimens obtained during procedure: none    PLAN:  1.  Colonoscopy in AM    Jacquie Trevino MD

## 2019-03-07 NOTE — PROGRESS NOTES
Problem: Falls - Risk of  Goal: *Absence of Falls  Document Reynaldo Fall Risk and appropriate interventions in the flowsheet.   Outcome: Progressing Towards Goal  Fall Risk Interventions:                 Elimination Interventions: Call light in reach, Elevated toilet seat, Patient to call for help with toileting needs, Toilet paper/wipes in reach, Toileting schedule/hourly rounds, Urinal in reach    History of Falls Interventions: Bed/chair exit alarm, Consult care management for discharge planning, Investigate reason for fall, Room close to nurse's station

## 2019-03-07 NOTE — PROGRESS NOTES
HOSPITALIST Progress Notes  NAME:  Lei Carver   Age:  74 y.o.  :   1945   MRN:   [id number]  PCP: Carolina Miranda  Consulting MD:  Treatment Team: Attending Provider: Jil Marsh MD; Consulting Provider: Jonathan Hill MD; Consulting Provider: Pinky Moore MD  Subjective/HPI:     \"Patient is a 73yo M with hx HTN, HLD, Aortic stenosis s/p bovine valve replacement presents with weakness and large bloody BMs. Pt was seen at PCP 2w prior for n/v, treated symptomatically for viral gastroenteritis and improved fully. Today, pt started with some loose stools and general weakness/fatigue then had a large blood and clots bowel movement. Has had several more loose and bloody BMs since then. Has occasionally passed small amount of BRB, but nothing like this. Has never hat it evaluated, has never had a colonoscopy. No blood thinners, but takes baby asa. No abdominal pain, no sob, no fevers. \"      2019- seen no further episodes of bleeding- gi -planning egd today.    Complete ROS done and is as stated in HPI or otherwise negative  Past Medical History:   Diagnosis Date    Actinic keratosis     Actinic keratosis     CAD (coronary artery disease)     CABG ,bovine valve    Cancer (HCC)     Cellulitis and abscess of leg, except foot     Disorders of magnesium metabolism     Essential hypertension, benign     Heart failure ([address]     MI     High cholesterol     History of prostate cancer     Hyperparathyroidism, unspecified ([address]     Hypertension     Mixed hyperlipidemia     Neoplasm of uncertain behavior of skin     S/P AVR (aortic valve replacement)     Scabies       Past Surgical History:   Procedure Laterality Date    CARDIAC SURG PROCEDURE UNLIST      HX CORONARY ARTERY BYPASS GRAFT      x4, vein harvest    HX HEENT      partial thyroid     HX HERNIA REPAIR  2014    HX KNEE ARTHROSCOPY Left     HX ORTHOPAEDIC      [facility] urology reviewed   Prior to Admission Medications   Prescriptions Last Dose Informant Patient Reported? Taking? GLUCOSAMINE/CHONDROITIN SULF A (GLUCOSAMINE-CHONDROITIN PO)   Yes No   Sig: Take 1 capsule by mouth two (2) times a day. Stop seven days prior to surgery per anesthesia protocol. aspirin delayed-release 81 mg tablet   Yes No   Sig: Take 81 mg by mouth every morning. Take day of surgery per anesthesia protocol. calcium 500 mg tab   Yes No   Sig: Take 1,500 mg by mouth two (2) times a day. Stop seven days prior to surgery per anesthesia protocol. carvedilol (COREG) 6.25 mg tablet   No No   Sig: Take 0.5 Tabs by mouth two (2) times a day. The pt states he takes 1/2 pill BID   krill-omega-3-dha-epa-lipids (KRILL OIL) 265-74-15-50 mg cap   Yes No   Sig: Take 1 capsule by mouth every morning. Stop seven days prior to surgery per anesthesia protocol. ondansetron (ZOFRAN ODT) 4 mg disintegrating tablet   No No   Sig: Take 1 Tab by mouth every eight (8) hours as needed for Nausea. pravastatin (PRAVACHOL) 80 mg tablet   No No   Sig: Take 1 Tab by mouth nightly. Indications: hypercholesterolemia, mixed hyperlipidemia   ramipril (ALTACE) 5 mg capsule   No No   Sig: Take 1 Cap by mouth daily.       Facility-Administered Medications: None     Allergies   Allergen Reactions    Pcn [Penicillins] Rash and Other (comments)     Fever, joint pain, dizzy        Social History     Tobacco Use    Smoking status: Never Smoker    Smokeless tobacco: Current User     Types: Chew    Tobacco comment: 1 pack per 3 weeks   Substance Use Topics    Alcohol use: Yes     Comment: 1-3 beers per year      Family History   Problem Relation Age of Onset    Diabetes Mother     Heart Disease Father         CVD/athrosclerotic    reviewed  Objective:     Visit Vitals  BP 91/56 (BP 1 Location: Left arm, BP Patient Position: At rest)   Pulse 70   Temp 98.5 °F (36.9 °C)   Resp 20   Ht 5' 9\" (1.753 m)   SpO2 97%   BMI 23.18 kg/m²      Temp (24hrs), Av.2 °F (36.8 °C), Min:97.6 °F (36.4 °C), Max:98.9 °F (37.2 °C)    Oxygen Therapy  O2 Sat (%): 97 % (19 0757)  Pulse via Oximetry: 48 beats per minute (19)  O2 Device: Room air (19)  Physical Exam:  General:    Alert, cooperative, no distress, appears stated age. Head:   Normocephalic, without obvious abnormality, atraumatic. Nose:  Nares normal. No drainage or sinus tenderness. Lungs:   Clear to auscultation bilaterally. No Wheezing or Rhonchi. No rales. Heart:   Borderline lupe, systolic murmur  Abdomen:   Soft, non-tender. Not distended. Bowel sounds hyperactive. Extremities: No cyanosis. No edema. No clubbing  Skin:     Texture, turgor normal. No rashes or lesions. Not Jaundiced  Neurologic: Alert and oriented x 3, no focal deficits   Data Review:   Recent Results (from the past 24 hour(s))   CBC WITH AUTOMATED DIFF    Collection Time: 19  4:59 PM   Result Value Ref Range    WBC 11.7 (H) 4.3 - 11.1 K/uL    RBC 3.88 (L) 4.23 - 5.6 M/uL    HGB 11.7 (L) 13.6 - 17.2 g/dL    HCT 36.4 (L) 41.1 - 50.3 %    MCV 93.8 79.6 - 97.8 FL    MCH 30.2 26.1 - 32.9 PG    MCHC 32.1 31.4 - 35.0 g/dL    RDW 12.7 11.9 - 14.6 %    PLATELET 138 333 - 803 K/uL    MPV 10.3 9.4 - 12.3 FL    ABSOLUTE NRBC 0.00 0.0 - 0.2 K/uL    DF AUTOMATED      NEUTROPHILS 85 (H) 43 - 78 %    LYMPHOCYTES 10 (L) 13 - 44 %    MONOCYTES 4 4.0 - 12.0 %    EOSINOPHILS 0 (L) 0.5 - 7.8 %    BASOPHILS 1 0.0 - 2.0 %    IMMATURE GRANULOCYTES 1 0.0 - 5.0 %    ABS. NEUTROPHILS 9.9 (H) 1.7 - 8.2 K/UL    ABS. LYMPHOCYTES 1.2 0.5 - 4.6 K/UL    ABS. MONOCYTES 0.4 0.1 - 1.3 K/UL    ABS. EOSINOPHILS 0.0 0.0 - 0.8 K/UL    ABS. BASOPHILS 0.1 0.0 - 0.2 K/UL    ABS. IMM.  GRANS. 0.1 0.0 - 0.5 K/UL   METABOLIC PANEL, COMPREHENSIVE    Collection Time: 19  4:59 PM   Result Value Ref Range    Sodium 135 (L) 136 - 145 mmol/L    Potassium 4.3 3.5 - 5.1 mmol/L    Chloride 101 98 - 107 mmol/L    CO2 26 21 - 32 mmol/L Anion gap 8 7 - 16 mmol/L    Glucose 209 (H) 65 - 100 mg/dL    BUN 22 8 - 23 MG/DL    Creatinine 1.24 0.8 - 1.5 MG/DL    GFR est AA >60 >60 ml/min/1.73m2    GFR est non-AA >60 >60 ml/min/1.73m2    Calcium 8.2 (L) 8.3 - 10.4 MG/DL    Bilirubin, total 0.4 0.2 - 1.1 MG/DL    ALT (SGPT) 16 12 - 65 U/L    AST (SGOT) 22 15 - 37 U/L    Alk. phosphatase 52 50 - 136 U/L    Protein, total 6.7 6.3 - 8.2 g/dL    Albumin 3.3 3.2 - 4.6 g/dL    Globulin 3.4 2.3 - 3.5 g/dL    A-G Ratio 1.0 (L) 1.2 - 3.5     TYPE & SCREEN    Collection Time: 03/06/19  4:59 PM   Result Value Ref Range    Crossmatch Expiration 03/09/2019     ABO/Rh(D) A POSITIVE     Antibody screen NEG    EKG, 12 LEAD, INITIAL    Collection Time: 03/06/19  6:36 PM   Result Value Ref Range    Ventricular Rate 48 BPM    Atrial Rate 48 BPM    P-R Interval 216 ms    QRS Duration 116 ms    Q-T Interval 482 ms    QTC Calculation (Bezet) 430 ms    Calculated P Axis 163 degrees    Calculated R Axis 96 degrees    Calculated T Axis -10 degrees    Diagnosis       !! AGE AND GENDER SPECIFIC ECG ANALYSIS !!   Sinus bradycardia  Rightward axis  Inferior infarct , age undetermined  Cannot rule out Anterior infarct , age undetermined  Abnormal ECG  When compared with ECG of 17-DEC-2001 17:04,  Ectopic atrial rhythm has replaced Sinus rhythm  Minimal criteria for Anterior infarct are now Present  ST now depressed in Inferior leads  T wave inversion now evident in Inferior leads  Confirmed by [facility]  MD (), Iesha HERNÁNDEZ ([id number]) on 3/7/2019 8:10:58 AM     HEMOGLOBIN A1C WITH EAG    Collection Time: 03/06/19  9:08 PM   Result Value Ref Range    Hemoglobin A1c 5.5 4.8 - 6.0 %    Est. average glucose 111 mg/dL   PROTHROMBIN TIME + INR    Collection Time: 03/06/19  9:08 PM   Result Value Ref Range    Prothrombin time 14.4 11.7 - 14.5 sec    INR 1.1     HGB & HCT    Collection Time: 03/06/19  9:08 PM   Result Value Ref Range    HGB 10.3 (L) 13.6 - 17.2 g/dL    HCT 31.9 (L) 41.1 - 50.3 % METABOLIC PANEL, BASIC    Collection Time: 03/07/19  4:52 AM   Result Value Ref Range    Sodium 143 136 - 145 mmol/L    Potassium 4.0 3.5 - 5.1 mmol/L    Chloride 111 (H) 98 - 107 mmol/L    CO2 27 21 - 32 mmol/L    Anion gap 5 (L) 7 - 16 mmol/L    Glucose 91 65 - 100 mg/dL    BUN 14 8 - 23 MG/DL    Creatinine 0.92 0.8 - 1.5 MG/DL    GFR est AA >60 >60 ml/min/1.73m2    GFR est non-AA >60 >60 ml/min/1.73m2    Calcium 7.5 (L) 8.3 - 10.4 MG/DL   CBC W/O DIFF    Collection Time: 03/07/19  4:52 AM   Result Value Ref Range    WBC 5.9 4.3 - 11.1 K/uL    RBC 2.87 (L) 4.23 - 5.6 M/uL    HGB 8.6 (L) 13.6 - 17.2 g/dL    HCT 26.9 (L) 41.1 - 50.3 %    MCV 93.7 79.6 - 97.8 FL    MCH 30.0 26.1 - 32.9 PG    MCHC 32.0 31.4 - 35.0 g/dL    RDW 12.7 11.9 - 14.6 %    PLATELET 500 (L) 211 - 450 K/uL    MPV 10.4 9.4 - 12.3 FL    ABSOLUTE NRBC 0.00 0.0 - 0.2 K/uL     Imaging /Procedures /Studies   No orders to display       Assessment and Plan: Active Hospital Problems    Diagnosis Date Noted    Lower GI bleed 03/06/2019    Mixed hyperlipidemia     Essential hypertension, benign        PLAN:  Lower GI bleed- gi consulted- planning egd today- pt wants to be discharged asap so would prefer to have colonoscopy as an outpatient per him  never had colonoscopy. Monitor H&H - transfuse as needed  prn antihypertensives.     Hold ASA  Glucose 209, check A1c      DVT PPx: SCD  Code Status: DNR    Anticipated discharge: unclear - maybe 1-2 days pending clinical course    Signed By: Lakeisha Marino MD     March 7, 2019

## 2019-03-08 ENCOUNTER — ANESTHESIA (OUTPATIENT)
Dept: ENDOSCOPY | Age: 74
DRG: 392 | End: 2019-03-08
Payer: MEDICARE

## 2019-03-08 ENCOUNTER — ANESTHESIA EVENT (OUTPATIENT)
Dept: ENDOSCOPY | Age: 74
DRG: 392 | End: 2019-03-08
Payer: MEDICARE

## 2019-03-08 LAB
FERRITIN SERPL-MCNC: 100 NG/ML (ref 8–388)
FOLATE SERPL-MCNC: 40.8 NG/ML (ref 3.1–17.5)
HCT VFR BLD AUTO: 25.2 % (ref 41.1–50.3)
HCT VFR BLD AUTO: 25.5 % (ref 41.1–50.3)
HCT VFR BLD AUTO: 27.9 % (ref 41.1–50.3)
HGB BLD-MCNC: 8.1 G/DL (ref 13.6–17.2)
HGB BLD-MCNC: 8.2 G/DL (ref 13.6–17.2)
HGB BLD-MCNC: 8.8 G/DL (ref 13.6–17.2)
IRON SATN MFR SERPL: 24 %
IRON SERPL-MCNC: 64 UG/DL (ref 35–150)
IRON SERPL-MCNC: 64 UG/DL (ref 35–150)
TIBC SERPL-MCNC: 268 UG/DL (ref 250–450)
TSH SERPL DL<=0.005 MIU/L-ACNC: 2.05 UIU/ML (ref 0.36–3.74)
VIT B12 SERPL-MCNC: 943 PG/ML (ref 193–986)

## 2019-03-08 PROCEDURE — 84443 ASSAY THYROID STIM HORMONE: CPT

## 2019-03-08 PROCEDURE — 74011250636 HC RX REV CODE- 250/636

## 2019-03-08 PROCEDURE — 74011000250 HC RX REV CODE- 250: Performed by: FAMILY MEDICINE

## 2019-03-08 PROCEDURE — 82746 ASSAY OF FOLIC ACID SERUM: CPT

## 2019-03-08 PROCEDURE — 82607 VITAMIN B-12: CPT

## 2019-03-08 PROCEDURE — 74011250637 HC RX REV CODE- 250/637: Performed by: FAMILY MEDICINE

## 2019-03-08 PROCEDURE — 36415 COLL VENOUS BLD VENIPUNCTURE: CPT

## 2019-03-08 PROCEDURE — 74011250636 HC RX REV CODE- 250/636: Performed by: FAMILY MEDICINE

## 2019-03-08 PROCEDURE — 76040000026: Performed by: INTERNAL MEDICINE

## 2019-03-08 PROCEDURE — 0DJD8ZZ INSPECTION OF LOWER INTESTINAL TRACT, VIA NATURAL OR ARTIFICIAL OPENING ENDOSCOPIC: ICD-10-PCS | Performed by: INTERNAL MEDICINE

## 2019-03-08 PROCEDURE — 85018 HEMOGLOBIN: CPT

## 2019-03-08 PROCEDURE — 83540 ASSAY OF IRON: CPT

## 2019-03-08 PROCEDURE — 82728 ASSAY OF FERRITIN: CPT

## 2019-03-08 PROCEDURE — 65660000000 HC RM CCU STEPDOWN

## 2019-03-08 PROCEDURE — 76060000032 HC ANESTHESIA 0.5 TO 1 HR: Performed by: INTERNAL MEDICINE

## 2019-03-08 PROCEDURE — 74011250636 HC RX REV CODE- 250/636: Performed by: INTERNAL MEDICINE

## 2019-03-08 PROCEDURE — C9113 INJ PANTOPRAZOLE SODIUM, VIA: HCPCS | Performed by: FAMILY MEDICINE

## 2019-03-08 RX ORDER — PROPOFOL 10 MG/ML
INJECTION, EMULSION INTRAVENOUS
Status: DISCONTINUED | OUTPATIENT
Start: 2019-03-08 | End: 2019-03-08 | Stop reason: HOSPADM

## 2019-03-08 RX ORDER — SODIUM CHLORIDE, SODIUM LACTATE, POTASSIUM CHLORIDE, CALCIUM CHLORIDE 600; 310; 30; 20 MG/100ML; MG/100ML; MG/100ML; MG/100ML
1000 INJECTION, SOLUTION INTRAVENOUS CONTINUOUS
Status: DISCONTINUED | OUTPATIENT
Start: 2019-03-08 | End: 2019-03-08 | Stop reason: HOSPADM

## 2019-03-08 RX ORDER — LIDOCAINE HYDROCHLORIDE 20 MG/ML
INJECTION, SOLUTION EPIDURAL; INFILTRATION; INTRACAUDAL; PERINEURAL AS NEEDED
Status: DISCONTINUED | OUTPATIENT
Start: 2019-03-08 | End: 2019-03-08 | Stop reason: HOSPADM

## 2019-03-08 RX ORDER — PROPOFOL 10 MG/ML
INJECTION, EMULSION INTRAVENOUS AS NEEDED
Status: DISCONTINUED | OUTPATIENT
Start: 2019-03-08 | End: 2019-03-08 | Stop reason: HOSPADM

## 2019-03-08 RX ADMIN — PROPOFOL 140 MCG/KG/MIN: 10 INJECTION, EMULSION INTRAVENOUS at 14:29

## 2019-03-08 RX ADMIN — SODIUM CHLORIDE 40 MG: 9 INJECTION, SOLUTION INTRAMUSCULAR; INTRAVENOUS; SUBCUTANEOUS at 21:53

## 2019-03-08 RX ADMIN — Medication 10 ML: at 05:37

## 2019-03-08 RX ADMIN — PRAVASTATIN SODIUM 80 MG: 80 TABLET ORAL at 21:52

## 2019-03-08 RX ADMIN — PROPOFOL 50 MG: 10 INJECTION, EMULSION INTRAVENOUS at 14:29

## 2019-03-08 RX ADMIN — Medication 10 ML: at 21:54

## 2019-03-08 RX ADMIN — LIDOCAINE HYDROCHLORIDE 40 MG: 20 INJECTION, SOLUTION EPIDURAL; INFILTRATION; INTRACAUDAL; PERINEURAL at 14:29

## 2019-03-08 RX ADMIN — SODIUM CHLORIDE 40 MG: 9 INJECTION, SOLUTION INTRAMUSCULAR; INTRAVENOUS; SUBCUTANEOUS at 08:18

## 2019-03-08 RX ADMIN — SODIUM CHLORIDE, SODIUM LACTATE, POTASSIUM CHLORIDE, AND CALCIUM CHLORIDE 1000 ML: 600; 310; 30; 20 INJECTION, SOLUTION INTRAVENOUS at 13:52

## 2019-03-08 RX ADMIN — SODIUM CHLORIDE 100 ML/HR: 900 INJECTION, SOLUTION INTRAVENOUS at 08:21

## 2019-03-08 RX ADMIN — Medication 5 ML: at 14:00

## 2019-03-08 NOTE — INTERVAL H&P NOTE
H&P Update:  Kalen Benjamin was seen and examined. History and physical has been reviewed. The patient has been examined.  There have been no significant clinical changes since the completion of the originally dated History and Physical.    Signed By: Umu Tabor MD     March 8, 2019 2:18 PM

## 2019-03-08 NOTE — PROGRESS NOTES
HOSPITALIST Progress Notes  NAME:  Alberto Kent   Age:  74 y.o.  :   1945   MRN:   [id number]  PCP: Sinai Sesay  Consulting MD:  Treatment Team: Attending Provider: Js Mathias MD; Consulting Provider: Feliciano Whitley MD; Consulting Provider: Gume Catherine MD; Utilization Review: Opal July; Care Manager: Mike Salgado  Subjective/HPI:     \"Patient is a 75yo M with hx HTN, HLD, Aortic stenosis s/p bovine valve replacement presents with weakness and large bloody BMs. Pt was seen at PCP 2w prior for n/v, treated symptomatically for viral gastroenteritis and improved fully. Today, pt started with some loose stools and general weakness/fatigue then had a large blood and clots bowel movement. Has had several more loose and bloody BMs since then. Has occasionally passed small amount of BRB, but nothing like this. Has never hat it evaluated, has never had a colonoscopy. No blood thinners, but takes baby asa. No abdominal pain, no sob, no fevers. \"      2019- seen no further episodes of bleeding- gi -planning egd today.        2019- seen - no adverse overnight events reported- awaiting colonoscopy   Complete ROS done and is as stated in HPI or otherwise negative  Past Medical History:   Diagnosis Date    Actinic keratosis     Actinic keratosis     CAD (coronary artery disease)     CABG ,bovine valve    Cancer (HCC)     Cellulitis and abscess of leg, except foot     Disorders of magnesium metabolism     Essential hypertension, benign     Heart failure ([address]     MI     High cholesterol     History of prostate cancer     Hyperparathyroidism, unspecified ([address]     Hypertension     Mixed hyperlipidemia     Neoplasm of uncertain behavior of skin     S/P AVR (aortic valve replacement)     Scabies       Past Surgical History:   Procedure Laterality Date    CARDIAC SURG PROCEDURE UNLIST      HX CORONARY ARTERY BYPASS GRAFT      x4, vein harvest    HX HEENT      partial thyroid     HX HERNIA REPAIR  12/2014    HX KNEE ARTHROSCOPY Left     HX ORTHOPAEDIC      [place] [id number] urology    reviewed   Prior to Admission Medications   Prescriptions Last Dose Informant Patient Reported? Taking? GLUCOSAMINE/CHONDROITIN SULF A (GLUCOSAMINE-CHONDROITIN PO)   Yes No   Sig: Take 1 capsule by mouth two (2) times a day. Stop seven days prior to surgery per anesthesia protocol. aspirin delayed-release 81 mg tablet   Yes No   Sig: Take 81 mg by mouth every morning. Take day of surgery per anesthesia protocol. calcium 500 mg tab   Yes No   Sig: Take 1,500 mg by mouth two (2) times a day. Stop seven days prior to surgery per anesthesia protocol. carvedilol (COREG) 6.25 mg tablet   No No   Sig: Take 0.5 Tabs by mouth two (2) times a day. The pt states he takes 1/2 pill BID   krill-omega-3-dha-epa-lipids (KRILL OIL) 480-95-41-50 mg cap   Yes No   Sig: Take 1 capsule by mouth every morning. Stop seven days prior to surgery per anesthesia protocol. ondansetron (ZOFRAN ODT) 4 mg disintegrating tablet   No No   Sig: Take 1 Tab by mouth every eight (8) hours as needed for Nausea. pravastatin (PRAVACHOL) 80 mg tablet   No No   Sig: Take 1 Tab by mouth nightly. Indications: hypercholesterolemia, mixed hyperlipidemia   ramipril (ALTACE) 5 mg capsule   No No   Sig: Take 1 Cap by mouth daily.       Facility-Administered Medications: None     Allergies   Allergen Reactions    Pcn [Penicillins] Rash and Other (comments)     Fever, joint pain, dizzy        Social History     Tobacco Use    Smoking status: Never Smoker    Smokeless tobacco: Current User     Types: Chew    Tobacco comment: 1 pack per 3 weeks   Substance Use Topics    Alcohol use: Yes     Comment: 1-3 beers per year      Family History   Problem Relation Age of Onset    Diabetes Mother     Heart Disease Father         CVD/athrosclerotic    reviewed  Objective:     Visit Vitals  /70 (BP 1 Location: Right arm, BP Patient Position: Sitting)   Pulse (!) 46   Temp 97.4 °F (36.3 °C)   Resp 16   Ht 5' 9\" (1.753 m)   SpO2 99%   BMI 23.18 kg/m²      Temp (24hrs), Av.9 °F (36.6 °C), Min:97 °F (36.1 °C), Max:98.4 °F (36.9 °C)    Oxygen Therapy  O2 Sat (%): 99 % (19 1627)  Pulse via Oximetry: 53 beats per minute (19 1543)  O2 Device: Room air (19 1551)  O2 Flow Rate (L/min): 2 l/min (19 1522)  Physical Exam:  General:    Alert, cooperative, no distress, appears stated age. Head:   Normocephalic, without obvious abnormality, atraumatic. Nose:  Nares normal. No drainage or sinus tenderness. Lungs:   Clear to auscultation bilaterally. No Wheezing or Rhonchi. No rales. Heart:   Borderline lupe, systolic murmur  Abdomen:   Soft, non-tender. Not distended. Bowel sounds hyperactive. Extremities: No cyanosis. No edema. No clubbing  Skin:     Texture, turgor normal. No rashes or lesions.   Not Jaundiced  Neurologic: Alert and oriented x 3, no focal deficits   Data Review:   Recent Results (from the past 24 hour(s))   HGB & HCT    Collection Time: 19  9:30 PM   Result Value Ref Range    HGB 8.6 (L) 13.6 - 17.2 g/dL    HCT 26.8 (L) 41.1 - 50.3 %   HGB & HCT    Collection Time: 19  4:19 AM   Result Value Ref Range    HGB 8.2 (L) 13.6 - 17.2 g/dL    HCT 25.5 (L) 41.1 - 50.3 %   HGB & HCT    Collection Time: 19 11:49 AM   Result Value Ref Range    HGB 8.8 (L) 13.6 - 17.2 g/dL    HCT 27.9 (L) 41.1 - 50.3 %   TSH 3RD GENERATION    Collection Time: 19  4:11 PM   Result Value Ref Range    TSH 2.050 0.358 - 3.740 uIU/mL   FOLATE    Collection Time: 19  4:11 PM   Result Value Ref Range    Folate 40.8 (H) 3.1 - 17.5 ng/mL   VITAMIN B12    Collection Time: 19  4:11 PM   Result Value Ref Range    Vitamin B12 943 193 - 986 pg/mL   IRON    Collection Time: 19  4:11 PM   Result Value Ref Range    Iron 64 35 - 150 ug/dL   TRANSFERRIN SATURATION Collection Time: 03/08/19  4:11 PM   Result Value Ref Range    Iron 64 35 - 150 ug/dL    TIBC 268 250 - 450 ug/dL    Transferrin Saturation 24 >20 %   FERRITIN    Collection Time: 03/08/19  4:11 PM   Result Value Ref Range    Ferritin 100 8 - 388 NG/ML     Imaging /Procedures /Studies   No orders to display       Assessment and Plan: Active Hospital Problems    Diagnosis Date Noted    Lower GI bleed 03/06/2019    Mixed hyperlipidemia     Essential hypertension, benign        PLAN:  Lower GI bleed- gi consulted- s/peged  For colonoscopy today  Monitor H&H - transfuse as needed  prn antihypertensives.     Hold ASA  Glucose 209, check A1c      DVT PPx: SCD  Code Status: DNR    Anticipated discharge: unclear - maybe tomorrow    Signed By: Taz Welch MD     March 8, 2019

## 2019-03-08 NOTE — PROCEDURES
COLONOSCOPY    DATE of PROCEDURE: 3/8/2019    INDICATION:  1. Melena  2. Anemia    POSTPROCEDURE DIAGNOSIS:  1. Diverticulosis of colon  2  Internal hemorrhoids    MEDICATION:   MAC. Further details per anesthesia note. INSTRUMENT:[id number]    PROCEDURE: After obtaining informed consent, the patient was placed in the left lateral position and sedated. The endoscope was advanced to the cecum where the appendiceal orifice and ileocecal valve were identified. Terminal ileum was entered. On withdrawal, the colon was carefully visualized in a 360 degree fashion. The patient was taken to the recovery area in stable condition. Good bowel prep. FINDINGS:  Rectum: Large internal hemorrhoids. Otherwise normal exam.  Sigmoid: Moderate diverticulosis. Otherwise normal exam.  Descending Colon: Mild diverticulosis. Otherwise normal exam.  Transverse Colon: Mild diverticulosis. Otherwise normal exam.  Ascending Colon: Mild diverticulosis. Otherwise normal exam.  Cecum: normal  Terminal ileum: entered and is normal    Estimated blood loss: 0-minimal   Specimens obtained during procedure: none      PLAN:  1. No repeat colonoscopy due to advanced age  3. Check iron studies/B12/folate  3. Will consider outpatient capsule endoscopy if iron deficient  4. Recent bleed episode may be attributed to diverticular bleed that has now stopped.      Lang Schroeder MD

## 2019-03-08 NOTE — ANESTHESIA POSTPROCEDURE EVALUATION
Procedure(s):  COLONOSCOPY/ 25/ ROOM 720.     Anesthesia Post Evaluation        Patient location during evaluation: bedside  Patient participation: complete - patient participated  Level of consciousness: responsive to verbal stimuli  Pain management: satisfactory to patient  Airway patency: patent  Anesthetic complications: no  Cardiovascular status: hemodynamically stable  Respiratory status: spontaneous ventilation  Hydration status: stable        Visit Vitals  BP 98/68   Pulse (!) 46   Temp 36.1 °C (97 °F)   Resp 16   Ht 5' 9\" (1.753 m)   SpO2 97%   BMI 23.18 kg/m²

## 2019-03-08 NOTE — ROUTINE PROCESS
TRANSFER - OUT REPORT:    Verbal report given to [place], RN on [address]  being transferred to [phone] for ordered procedure       Report consisted of patients Situation, Background, Assessment and   Recommendations(SBAR). Information from the following report(s) SBAR and Procedure Summary was reviewed with the receiving nurse. Lines:   Peripheral IV 03/06/19 Left Antecubital (Active)   Site Assessment Clean, dry, & intact 3/8/2019  8:22 AM   Phlebitis Assessment 0 3/8/2019  8:22 AM   Infiltration Assessment 0 3/8/2019  8:22 AM   Dressing Status Clean, dry, & intact 3/8/2019  8:22 AM   Dressing Type Tape;Transparent 3/8/2019  8:22 AM   Hub Color/Line Status Pink; Infusing 3/8/2019  8:22 AM   Alcohol Cap Used No 3/8/2019  8:22 AM        Opportunity for questions and clarification was provided.       Patient transported with:   Peerio

## 2019-03-08 NOTE — ANESTHESIA PREPROCEDURE EVALUATION
Anesthetic History   No history of anesthetic complications            Review of Systems / Medical History  Patient summary reviewed and pertinent labs reviewed    Pulmonary  Within defined limits                 Neuro/Psych              Cardiovascular    Hypertension: well controlled          CAD and CABG    Exercise tolerance: >4 METS  Comments: CABG/AVR in 2012   GI/Hepatic/Renal                Endo/Other        Anemia     Other Findings   Comments: H/O partial parathyroidectomy  Lower GI Bleed           Physical Exam    Airway  Mallampati: I  TM Distance: 4 - 6 cm  Neck ROM: normal range of motion   Mouth opening: Normal     Cardiovascular    Rhythm: regular      Murmur: Grade 4, Aortic area     Dental  No notable dental hx       Pulmonary  Breath sounds clear to auscultation               Abdominal         Other Findings            Anesthetic Plan    ASA: 3  Anesthesia type: total IV anesthesia          Induction: Intravenous  Anesthetic plan and risks discussed with: Patient

## 2019-03-08 NOTE — PROGRESS NOTES
TRANSFER - IN REPORT:    Verbal report received from Sofia(name) on [address]  being received from 720(unit) for ordered procedure      Report consisted of patients Situation, Background, Assessment and   Recommendations(SBAR). Information from the following report(s) SBAR was reviewed with the receiving nurse. Opportunity for questions and clarification was provided. Assessment completed upon patients arrival to unit and care assumed.

## 2019-03-08 NOTE — PROGRESS NOTES
Initial visit to assess pt's spiritual needs. Pt was out of his room, having a procedure in endoscopy. No family members were present. Left a card.       Chaplain Keren Howard MDiv,ThM,PhD

## 2019-03-08 NOTE — PROGRESS NOTES
Problem: Interdisciplinary Rounds  Goal: Interdisciplinary Rounds  Outcome: Progressing Towards Goal  Interdisciplinary team rounds were held 3/8/2019 with the following team members:Care Management, Nursing, Occupational Therapy, Physician and  and the patient and family. Plan of care discussed. See clinical pathway and/or care plan for interventions and desired outcomes.

## 2019-03-09 VITALS
RESPIRATION RATE: 18 BRPM | DIASTOLIC BLOOD PRESSURE: 78 MMHG | OXYGEN SATURATION: 97 % | TEMPERATURE: 98.4 F | HEIGHT: 69 IN | BODY MASS INDEX: 23.18 KG/M2 | SYSTOLIC BLOOD PRESSURE: 148 MMHG | HEART RATE: 53 BPM

## 2019-03-09 LAB
HCT VFR BLD AUTO: 24 % (ref 41.1–50.3)
HCT VFR BLD AUTO: 28.9 % (ref 41.1–50.3)
HGB BLD-MCNC: 7.6 G/DL (ref 13.6–17.2)
HGB BLD-MCNC: 9.5 G/DL (ref 13.6–17.2)

## 2019-03-09 PROCEDURE — 74011000250 HC RX REV CODE- 250: Performed by: FAMILY MEDICINE

## 2019-03-09 PROCEDURE — C9113 INJ PANTOPRAZOLE SODIUM, VIA: HCPCS | Performed by: FAMILY MEDICINE

## 2019-03-09 PROCEDURE — 36430 TRANSFUSION BLD/BLD COMPNT: CPT

## 2019-03-09 PROCEDURE — 85018 HEMOGLOBIN: CPT

## 2019-03-09 PROCEDURE — P9016 RBC LEUKOCYTES REDUCED: HCPCS

## 2019-03-09 PROCEDURE — 74011250636 HC RX REV CODE- 250/636: Performed by: FAMILY MEDICINE

## 2019-03-09 PROCEDURE — 30233N1 TRANSFUSION OF NONAUTOLOGOUS RED BLOOD CELLS INTO PERIPHERAL VEIN, PERCUTANEOUS APPROACH: ICD-10-PCS | Performed by: INTERNAL MEDICINE

## 2019-03-09 PROCEDURE — 36415 COLL VENOUS BLD VENIPUNCTURE: CPT

## 2019-03-09 RX ORDER — SODIUM CHLORIDE 9 MG/ML
250 INJECTION, SOLUTION INTRAVENOUS AS NEEDED
Status: DISCONTINUED | OUTPATIENT
Start: 2019-03-09 | End: 2019-03-09 | Stop reason: HOSPADM

## 2019-03-09 RX ADMIN — SODIUM CHLORIDE 40 MG: 9 INJECTION, SOLUTION INTRAMUSCULAR; INTRAVENOUS; SUBCUTANEOUS at 08:22

## 2019-03-09 RX ADMIN — Medication 10 ML: at 05:11

## 2019-03-09 RX ADMIN — Medication 10 ML: at 14:00

## 2019-03-09 NOTE — DISCHARGE SUMMARY
Hospitalist Discharge Summary     Admit Date:  3/6/2019  6:13 PM   Name:  Kenyon Mendez   Age:  74 y.o.  :  1945   MRN:  [id number]   PCP:  Lan Mcdowell DO  Treatment Team: Attending Provider: Ike Valenzuela MD; Consulting Provider: Kyara Santos MD; Utilization Review: Jessenia Parham; Care Manager: Ted Police    Problem List for this Hospitalization:  Hospital Problems as of 3/9/2019 Date Reviewed: 3/8/2019          Codes Class Noted - Resolved POA    * (Principal) Lower GI bleed ICD-10-CM: K92.2  ICD-9-CM: 578.9  3/6/2019 - Present Yes        Mixed hyperlipidemia ICD-10-CM: E78.2  ICD-9-CM: 272.2  Unknown - Present Yes        Essential hypertension, benign ICD-10-CM: I10  ICD-9-CM: 401.1  Unknown - Present Yes                Admission HPI from 3/6/2019:      Patient is a 73yo M with hx HTN, HLD, Aortic stenosis s/p bovine valve replacement presents with weakness and large bloody BMs. Pt was seen at PCP 2w prior for n/v, treated symptomatically for viral gastroenteritis and improved fully. Today, pt started with some loose stools and general weakness/fatigue then had a large blood and clots bowel movement. Has had several more loose and bloody BMs since then. Has occasionally passed small amount of BRB, but nothing like this. Has never hat it evaluated, has never had a colonoscopy. No blood thinners, but takes baby asa. No abdominal pain, no sob, no fevers.     Hospital Course:    Pt is a 75 yo male with pmh HTN, HLD, aortic stenosis s/p bovine valve replacement who presented to ER with acute onset right bed bloody BMs. GI consulted and pt underwent colonoscopy with findings of diverticulosis, suspected source of bleed. He has had no further bleeding. Transfused 1 unit PRBC and pt is now stable for d/c home. He should follow up with PCP for repeat CBC in 1 week. If further bleeding continues GI will consider capsule study. Okay to cont baby ASA.      Follow up instructions and discharge meds at bottom of this note. Plan was discussed with pt, GI, supervising MD.  All questions answered. Patient was stable at time of discharge. Diagnostic Imaging/Tests:   No results found. Echocardiogram results:  No results found for this visit on 03/06/19. All Micro Results     None          Labs: Results:       BMP, Mg, Phos Recent Labs     03/07/19 0452 03/06/19  1659    135*   K 4.0 4.3   * 101   CO2 27 26   AGAP 5* 8   BUN 14 22   CREA 0.92 1.24   CA 7.5* 8.2*   GLU 91 209*   MG 1.8  --       CBC Recent Labs     03/09/19  0430 03/08/19  1956 03/08/19  1149  03/07/19 0452 03/06/19 1659   WBC  --   --   --   --  5.9  --  11.7*   RBC  --   --   --   --  2.87*  --  3.88*   HGB 7.6* 8.1* 8.8*   < > 8.6*   < > 11.7*   HCT 24.0* 25.2* 27.9*   < > 26.9*   < > 36.4*   PLT  --   --   --   --  130*  --  189   GRANS  --   --   --   --   --   --  85*   LYMPH  --   --   --   --   --   --  10*   EOS  --   --   --   --   --   --  0*   MONOS  --   --   --   --   --   --  4   BASOS  --   --   --   --   --   --  1   IG  --   --   --   --   --   --  1   ANEU  --   --   --   --   --   --  9.9*   ABL  --   --   --   --   --   --  1.2   TIMMY  --   --   --   --   --   --  0.0   ABM  --   --   --   --   --   --  0.4   ABB  --   --   --   --   --   --  0.1   AIG  --   --   --   --   --   --  0.1    < > = values in this interval not displayed.       LFT Recent Labs     03/06/19 1659   SGOT 22   ALT 16   AP 52   TP 6.7   ALB 3.3   GLOB 3.4   AGRAT 1.0*      Cardiac Testing No results found for: BNPP, BNP, CPK, RCK1, RCK2, RCK3, RCK4, CKMB, CKNDX, CKND1, TROPT, TROIQ   Coagulation Tests Lab Results   Component Value Date/Time    Prothrombin time 14.4 03/06/2019 09:08 PM    INR 1.1 03/06/2019 09:08 PM      A1c Lab Results   Component Value Date/Time    Hemoglobin A1c 5.5 03/06/2019 09:08 PM    Hemoglobin A1c 5.5 10/04/2018 08:54 AM      Lipid Panel Lab Results   Component Value Date/Time Cholesterol, total 141 10/04/2018 08:54 AM    HDL Cholesterol 70 10/04/2018 08:54 AM    LDL, calculated 61 10/04/2018 08:54 AM    VLDL, calculated 10 10/04/2018 08:54 AM    Triglyceride 48 10/04/2018 08:54 AM      Thyroid Panel Lab Results   Component Value Date/Time    TSH 2.050 03/08/2019 04:11 PM    TSH 2.760 10/04/2018 08:54 AM        Most Recent UA No results found for: COLOR, APPRN, REFSG, PATIENCE, PROTU, GLUCU, KETU, BILU, BLDU, UROU, MARIANELA, LEUKU, WBCU, RBCU, UEPI, BACTU, CASTS, UCRY, MUCUS, UCOM     Allergies   Allergen Reactions    Pcn [Penicillins] Rash and Other (comments)     Fever, joint pain, dizzy       Immunization History   Administered Date(s) Administered    Pneumococcal Vaccine (Unspecified Type) 09/13/2013       All Labs from Last 24 Hrs:  Recent Results (from the past 24 hour(s))   TSH 3RD GENERATION    Collection Time: 03/08/19  4:11 PM   Result Value Ref Range    TSH 2.050 0.358 - 3.740 uIU/mL   FOLATE    Collection Time: 03/08/19  4:11 PM   Result Value Ref Range    Folate 40.8 (H) 3.1 - 17.5 ng/mL   VITAMIN B12    Collection Time: 03/08/19  4:11 PM   Result Value Ref Range    Vitamin B12 943 193 - 986 pg/mL   IRON    Collection Time: 03/08/19  4:11 PM   Result Value Ref Range    Iron 64 35 - 150 ug/dL   TRANSFERRIN SATURATION    Collection Time: 03/08/19  4:11 PM   Result Value Ref Range    Iron 64 35 - 150 ug/dL    TIBC 268 250 - 450 ug/dL    Transferrin Saturation 24 >20 %   FERRITIN    Collection Time: 03/08/19  4:11 PM   Result Value Ref Range    Ferritin 100 8 - 388 NG/ML   HGB & HCT    Collection Time: 03/08/19  7:56 PM   Result Value Ref Range    HGB 8.1 (L) 13.6 - 17.2 g/dL    HCT 25.2 (L) 41.1 - 50.3 %   HGB & HCT    Collection Time: 03/09/19  4:30 AM   Result Value Ref Range    HGB 7.6 (L) 13.6 - 17.2 g/dL    HCT 24.0 (L) 41.1 - 50.3 %       Current Med List in Hospital:   Current Facility-Administered Medications   Medication Dose Route Frequency    0.9% sodium chloride infusion 250 mL  250 mL IntraVENous PRN    pravastatin (PRAVACHOL) tablet 80 mg  80 mg Oral QHS    sodium chloride (NS) flush 5-40 mL  5-40 mL IntraVENous Q8H    sodium chloride (NS) flush 5-40 mL  5-40 mL IntraVENous PRN    ondansetron (ZOFRAN) injection 4 mg  4 mg IntraVENous Q4H PRN    0.9% sodium chloride infusion  100 mL/hr IntraVENous CONTINUOUS    pantoprazole (PROTONIX) 40 mg in sodium chloride 0.9% 10 mL injection  40 mg IntraVENous Q12H       Discharge Exam:  Patient Vitals for the past 24 hrs:   Temp Pulse Resp BP SpO2   03/09/19 1330 98.4 °F (36.9 °C) (!) 53 18 148/78 97 %   03/09/19 1230 98.1 °F (36.7 °C) 80 18 133/80 97 %   03/09/19 1127 98.7 °F (37.1 °C) (!) 58 18 129/78 97 %   03/09/19 1112 98.1 °F (36.7 °C) (!) 58 18 137/81 97 %   03/09/19 0725 98.8 °F (37.1 °C) 60 16 103/59 95 %   03/09/19 0418 98.3 °F (36.8 °C) (!) 53 17 114/68 97 %   03/08/19 2245 98.3 °F (36.8 °C) (!) 57 18 115/64 94 %   03/08/19 1905 97.8 °F (36.6 °C) (!) 57 18 115/63 100 %   03/08/19 1627 97.4 °F (36.3 °C) (!) 46 16 123/70 99 %   03/08/19 1551  60 18 106/60 98 %   03/08/19 1543  (!) 55 18 121/67 97 %   03/08/19 1533  (!) 46 16 98/68 97 %   03/08/19 1522  67 15 107/67 99 %   03/08/19 1513  71 15 110/66 99 %   03/08/19 1510 97 °F (36.1 °C) 75 16 135/59 96 %   03/08/19 1429  (!) 59 15 126/62 100 %     Oxygen Therapy  O2 Sat (%): 97 % (03/09/19 1330)  Pulse via Oximetry: 53 beats per minute (03/08/19 1543)  O2 Device: Room air (03/08/19 1551)  O2 Flow Rate (L/min): 2 l/min (03/08/19 1522)    Intake/Output Summary (Last 24 hours) at 3/9/2019 1351  Last data filed at 3/9/2019 0824  Gross per 24 hour   Intake 840 ml   Output    Net 840 ml       General:    Well nourished. Alert. Eyes:   Normal sclera. Extraocular movements intact. ENT:  Normocephalic, atraumatic. Moist mucous membranes  CV:   Regular rate and rhythm. No murmur, rub, or gallop. Lungs:  Clear to auscultation bilaterally.   No wheezing, rhonchi, or rales.  Abdomen: Soft, nontender, nondistended. Bowel sounds normal.   Extremities: Warm and dry. No cyanosis or edema. Neurologic: CN II-XII grossly intact. Sensation intact. Skin:     No rashes or jaundice. Psych:  Normal mood and affect. Discharge Info:   Current Discharge Medication List      CONTINUE these medications which have NOT CHANGED    Details   ondansetron (ZOFRAN ODT) 4 mg disintegrating tablet Take 1 Tab by mouth every eight (8) hours as needed for Nausea. Qty: 20 Tab, Refills: 0      pravastatin (PRAVACHOL) 80 mg tablet Take 1 Tab by mouth nightly. Indications: hypercholesterolemia, mixed hyperlipidemia  Qty: 90 Tab, Refills: 3    Associated Diagnoses: Mixed hyperlipidemia; Coronary artery disease involving native coronary artery without angina pectoris, unspecified whether native or transplanted heart      carvedilol (COREG) 6.25 mg tablet Take 0.5 Tabs by mouth two (2) times a day. The pt states he takes 1/2 pill BID  Qty: 90 Tab, Refills: 3    Associated Diagnoses: Essential hypertension, benign; Coronary artery disease involving native coronary artery without angina pectoris, unspecified whether native or transplanted heart      ramipril (ALTACE) 5 mg capsule Take 1 Cap by mouth daily. Qty: 90 Cap, Refills: 3      krill-omega-3-dha-epa-lipids (KRILL OIL) 953-94-92-77 mg cap Take 1 capsule by mouth every morning. Stop seven days prior to surgery per anesthesia protocol. calcium 500 mg tab Take 1,500 mg by mouth two (2) times a day. Stop seven days prior to surgery per anesthesia protocol. aspirin delayed-release 81 mg tablet Take 81 mg by mouth every morning. Take day of surgery per anesthesia protocol. GLUCOSAMINE/CHONDROITIN SULF A (GLUCOSAMINE-CHONDROITIN PO) Take 1 capsule by mouth two (2) times a day. Stop seven days prior to surgery per anesthesia protocol.            Disposition: Home    Activity: Regular   Diet: DIET REGULAR    Follow-up Appointments Procedures    FOLLOW UP VISIT Appointment in: One Week PCP in 1 week     PCP in 1 week     Standing Status:   Standing     Number of Occurrences:   1     Order Specific Question:   Appointment in     Answer: One Week         Follow-up Information     Follow up With Specialties Details Why Contact Info    Aniket Castillo DO Internal Medicine   [address] [facility] [address]  [phone]            Time spent in patient discharge planning and coordination 35 minutes.     Signed:  July Gu NP

## 2019-03-09 NOTE — PROGRESS NOTES
Gave discharge instructions to the pt, the pt voiced a clear understanding, IV and tele removed at this time. The primary care nurse is aware the pt is ready to be walked tot he  area.

## 2019-03-09 NOTE — PROGRESS NOTES
GI DAILY PROGRESS NOTE    Admit Date:  3/6/2019    Today's Date:  3/9/2019    CC:  GIB    Subjective:     Patient had no stools overnight. Denies BRRB, abd pain. Wants to go home. Medications:   Current Facility-Administered Medications   Medication Dose Route Frequency    pravastatin (PRAVACHOL) tablet 80 mg  80 mg Oral QHS    sodium chloride (NS) flush 5-40 mL  5-40 mL IntraVENous Q8H    sodium chloride (NS) flush 5-40 mL  5-40 mL IntraVENous PRN    ondansetron (ZOFRAN) injection 4 mg  4 mg IntraVENous Q4H PRN    0.9% sodium chloride infusion  100 mL/hr IntraVENous CONTINUOUS    pantoprazole (PROTONIX) 40 mg in sodium chloride 0.9% 10 mL injection  40 mg IntraVENous Q12H       Review of Systems:  ROS was obtained, with pertinent positives as listed above. No chest pain or SOB. Diet:  Cardiac    Objective:   Vitals:  Visit Vitals  /59 (BP 1 Location: Right arm, BP Patient Position: At rest)   Pulse 60   Temp 98.8 °F (37.1 °C)   Resp 16   Ht 5' 9\" (1.753 m)   SpO2 95%   BMI 23.18 kg/m²     Intake/Output:  03/09 0701 - 03/09 1900  In: 240 [P.O.:240]  Out: -   03/07 1901 - 03/09 0700  In: 600 [I.V.:600]  Out: -   Exam:  General appearance: alert, cooperative, no distress  Lungs: clear to auscultation bilaterally anteriorly  Heart: regular rate and rhythm  Abdomen: soft, non-tender.  Bowel sounds normal. No masses, no organomegaly  Extremities: extremities normal, atraumatic, no cyanosis or edema  Neuro:  alert and oriented    Data Review (Labs):    Recent Labs     03/09/19  0430 03/08/19  1956 03/08/19  1149 03/08/19  0419 03/07/19  2130 03/07/19  1414 03/07/19  0452 03/06/19  2108 03/06/19  1659   WBC  --   --   --   --   --   --  5.9  --  11.7*   HGB 7.6* 8.1* 8.8* 8.2* 8.6* 8.1* 8.6* 10.3* 11.7*   HCT 24.0* 25.2* 27.9* 25.5* 26.8* 25.7* 26.9* 31.9* 36.4*   PLT  --   --   --   --   --   --  130*  --  189   MCV  --   --   --   --   --   --  93.7  --  93.8   NA  --   --   --   --   --   -- 143  --  135*   K  --   --   --   --   --   --  4.0  --  4.3   CL  --   --   --   --   --   --  111*  --  101   CO2  --   --   --   --   --   --  27  --  26   BUN  --   --   --   --   --   --  14  --  22   CREA  --   --   --   --   --   --  0.92  --  1.24   CA  --   --   --   --   --   --  7.5*  --  8.2*   MG  --   --   --   --   --   --  1.8  --   --    GLU  --   --   --   --   --   --  91  --  209*   AP  --   --   --   --   --   --   --   --  52   SGOT  --   --   --   --   --   --   --   --  22   ALT  --   --   --   --   --   --   --   --  16   TBILI  --   --   --   --   --   --   --   --  0.4   ALB  --   --   --   --   --   --   --   --  3.3   TP  --   --   --   --   --   --   --   --  6.7   PTP  --   --   --   --   --   --   --  14.4  --    INR  --   --   --   --   --   --   --  1.1  --        Assessment:     Principal Problem:    Lower GI bleed (3/6/2019)    Active Problems:    Mixed hyperlipidemia ()      Essential hypertension, benign ()      74 y.o. male with PMH of CAD (CABG 2013) HTN, HLD, Aortic stenosis s/p bovine valve replacement and prostate cancer admitted w dark stool and anemia with hgb drop from 13.7 to 8.6 with no elevation in BUN. Reports loose stool yesterday that were dark but also reported to other providers \"bloody\". Etiologies for his GIB could include UGIB sources such as PUD, AVMs or lower sources such as mass, lesion, diverticular bleed. No blood thinners other than ASA 81 mg. EGD 3/7 with HH and duodenal diverticulum. [place] 3/8 Diverticulosis of colon & Internal hemorrhoids    Plan:     - Iron, B12 and folate normal 3/8/19  - No additional rectal bleeding. Likely diverticular bleed  - Hgb lower at 7.6 today will give 1 U PRBC pt discussed with pt and agreeable    Yumiko Hernandez NP  Patient is seen and examined in collaboration with Dr. Radha Shaw. Assessment and plan as per Dr. Willa Perdomo.

## 2019-03-09 NOTE — PROGRESS NOTES
Problem: Falls - Risk of  Goal: *Absence of Falls  Document Reynaldo Fall Risk and appropriate interventions in the flowsheet.   Outcome: Progressing Towards Goal  Fall Risk Interventions:                 Elimination Interventions: Call light in reach, Patient to call for help with toileting needs, Toilet paper/wipes in reach    History of Falls Interventions: Door open when patient unattended

## 2019-03-10 LAB
ABO + RH BLD: NORMAL
BLD PROD TYP BPU: NORMAL
BLOOD GROUP ANTIBODIES SERPL: NORMAL
BPU ID: NORMAL
CROSSMATCH RESULT,%XM: NORMAL
SPECIMEN EXP DATE BLD: NORMAL
STATUS OF UNIT,%ST: NORMAL
UNIT DIVISION, %UDIV: 0

## 2019-07-10 ENCOUNTER — PATIENT OUTREACH (OUTPATIENT)
Dept: CASE MANAGEMENT | Age: 74
End: 2019-07-10

## 2019-07-10 NOTE — PROGRESS NOTES
Medication Adherence Outreach    Date/Time of Call:  7/10/19  9:25 AM   Name of medication and dosage:  Ramipril 5 mg  1 tab daily-ON HOLD   Pravastatin 80 mg  1 tab nightly   Does the patient know the purpose and dosage of medication? Yes   Are you getting a #30 day or #90 day supply of your medication? 90 days   Are you still taking this medication? If not, why? Yes   Transportation issues or any problems paying for the medication or other reason? No   What pharmacy are you using to fill your medication and do you know the last time that you got your medication filled? [facility]      Are you having any side effects from taking your medication? No         Any other questions or concerns expressed by the patient. No   Comments: Medication Adherence regarding Ramipril and Pravastatin discussed with patient. He states Dr Monica Easley, [facility], put his Ramipril on hold, for now. Pt denies any barriers at this time. He states he does occasionally miss a dose. Reminded patient the importance of taking medications as prescribe. He verbalized understanding. Patient does use a pill minder.          Call Completed By: Gillian Ac, [address] [place] [address] 30

## 2022-03-19 PROBLEM — K92.2 LOWER GI BLEED: Status: ACTIVE | Noted: 2019-03-06

## 2022-05-05 DIAGNOSIS — I10 ESSENTIAL HYPERTENSION, BENIGN: Primary | ICD-10-CM

## 2022-05-05 DIAGNOSIS — E78.2 MIXED HYPERLIPIDEMIA: ICD-10-CM

## 2022-07-19 ENCOUNTER — NURSE ONLY (OUTPATIENT)
Dept: INTERNAL MEDICINE CLINIC | Facility: CLINIC | Age: 77
End: 2022-07-19

## 2022-07-19 DIAGNOSIS — E78.2 MIXED HYPERLIPIDEMIA: ICD-10-CM

## 2022-07-19 DIAGNOSIS — I10 ESSENTIAL HYPERTENSION, BENIGN: ICD-10-CM

## 2022-07-19 LAB
ALBUMIN SERPL-MCNC: 3.8 G/DL (ref 3.2–4.6)
ALBUMIN/GLOB SERPL: 1.1 {RATIO} (ref 1.2–3.5)
ALP SERPL-CCNC: 56 U/L (ref 50–136)
ALT SERPL-CCNC: 28 U/L (ref 12–65)
ANION GAP SERPL CALC-SCNC: 5 MMOL/L (ref 7–16)
AST SERPL-CCNC: 21 U/L (ref 15–37)
BASOPHILS # BLD: 0.1 K/UL (ref 0–0.2)
BASOPHILS NFR BLD: 1 % (ref 0–2)
BILIRUB SERPL-MCNC: 0.5 MG/DL (ref 0.2–1.1)
BUN SERPL-MCNC: 17 MG/DL (ref 8–23)
CALCIUM SERPL-MCNC: 8.9 MG/DL (ref 8.3–10.4)
CHLORIDE SERPL-SCNC: 107 MMOL/L (ref 98–107)
CO2 SERPL-SCNC: 28 MMOL/L (ref 21–32)
CREAT SERPL-MCNC: 1.1 MG/DL (ref 0.8–1.5)
DIFFERENTIAL METHOD BLD: NORMAL
EOSINOPHIL # BLD: 0.4 K/UL (ref 0–0.8)
EOSINOPHIL NFR BLD: 5 % (ref 0.5–7.8)
ERYTHROCYTE [DISTWIDTH] IN BLOOD BY AUTOMATED COUNT: 13.3 % (ref 11.9–14.6)
GLOBULIN SER CALC-MCNC: 3.4 G/DL (ref 2.3–3.5)
GLUCOSE SERPL-MCNC: 95 MG/DL (ref 65–100)
HCT VFR BLD AUTO: 46.5 % (ref 41.1–50.3)
HGB BLD-MCNC: 14.9 G/DL (ref 13.6–17.2)
IMM GRANULOCYTES # BLD AUTO: 0 K/UL (ref 0–0.5)
IMM GRANULOCYTES NFR BLD AUTO: 0 % (ref 0–5)
LYMPHOCYTES # BLD: 2.4 K/UL (ref 0.5–4.6)
LYMPHOCYTES NFR BLD: 35 % (ref 13–44)
MCH RBC QN AUTO: 31 PG (ref 26.1–32.9)
MCHC RBC AUTO-ENTMCNC: 32 G/DL (ref 31.4–35)
MCV RBC AUTO: 96.9 FL (ref 79.6–97.8)
MONOCYTES # BLD: 0.7 K/UL (ref 0.1–1.3)
MONOCYTES NFR BLD: 10 % (ref 4–12)
NEUTS SEG # BLD: 3.3 K/UL (ref 1.7–8.2)
NEUTS SEG NFR BLD: 49 % (ref 43–78)
NRBC # BLD: 0 K/UL (ref 0–0.2)
PLATELET # BLD AUTO: 187 K/UL (ref 150–450)
PMV BLD AUTO: 10.9 FL (ref 9.4–12.3)
POTASSIUM SERPL-SCNC: 4.4 MMOL/L (ref 3.5–5.1)
PROT SERPL-MCNC: 7.2 G/DL (ref 6.3–8.2)
RBC # BLD AUTO: 4.8 M/UL (ref 4.23–5.6)
SODIUM SERPL-SCNC: 140 MMOL/L (ref 138–145)
TSH, 3RD GENERATION: 2.69 UIU/ML (ref 0.36–3.74)
WBC # BLD AUTO: 6.8 K/UL (ref 4.3–11.1)

## 2022-07-26 ENCOUNTER — OFFICE VISIT (OUTPATIENT)
Dept: INTERNAL MEDICINE CLINIC | Facility: CLINIC | Age: 77
End: 2022-07-26
Payer: MEDICARE

## 2022-07-26 VITALS
WEIGHT: 185 LBS | HEIGHT: 69 IN | DIASTOLIC BLOOD PRESSURE: 78 MMHG | HEART RATE: 47 BPM | SYSTOLIC BLOOD PRESSURE: 130 MMHG | BODY MASS INDEX: 27.4 KG/M2 | OXYGEN SATURATION: 95 %

## 2022-07-26 DIAGNOSIS — M21.162 GENU VARUM OF BOTH LOWER EXTREMITIES: Primary | ICD-10-CM

## 2022-07-26 DIAGNOSIS — M25.562 CHRONIC PAIN OF BOTH KNEES: ICD-10-CM

## 2022-07-26 DIAGNOSIS — E21.3 HYPERPARATHYROIDISM, UNSPECIFIED (HCC): ICD-10-CM

## 2022-07-26 DIAGNOSIS — G89.29 CHRONIC PAIN OF BOTH KNEES: ICD-10-CM

## 2022-07-26 DIAGNOSIS — I10 ESSENTIAL HYPERTENSION, BENIGN: ICD-10-CM

## 2022-07-26 DIAGNOSIS — M25.561 CHRONIC PAIN OF BOTH KNEES: ICD-10-CM

## 2022-07-26 DIAGNOSIS — E78.2 MIXED HYPERLIPIDEMIA: ICD-10-CM

## 2022-07-26 DIAGNOSIS — M21.161 GENU VARUM OF BOTH LOWER EXTREMITIES: Primary | ICD-10-CM

## 2022-07-26 PROCEDURE — 99214 OFFICE O/P EST MOD 30 MIN: CPT | Performed by: INTERNAL MEDICINE

## 2022-07-26 PROCEDURE — 1123F ACP DISCUSS/DSCN MKR DOCD: CPT | Performed by: INTERNAL MEDICINE

## 2022-07-26 RX ORDER — CALCIUM CARBONATE 500(1250)
1500 TABLET ORAL 2 TIMES DAILY
COMMUNITY

## 2022-07-26 RX ORDER — TURMERIC 400 MG
800 CAPSULE ORAL DAILY
COMMUNITY
End: 2022-07-26

## 2022-07-26 RX ORDER — PRAVASTATIN SODIUM 80 MG/1
80 TABLET ORAL DAILY
Qty: 30 TABLET | Refills: 11 | Status: ON HOLD | OUTPATIENT
Start: 2022-07-26 | End: 2022-10-20 | Stop reason: HOSPADM

## 2022-07-26 ASSESSMENT — ENCOUNTER SYMPTOMS
NAUSEA: 0
DIARRHEA: 0
SINUS PAIN: 0
WHEEZING: 0
VOMITING: 0
CONSTIPATION: 0
ABDOMINAL PAIN: 0
SHORTNESS OF BREATH: 0

## 2022-07-26 ASSESSMENT — PATIENT HEALTH QUESTIONNAIRE - PHQ9
SUM OF ALL RESPONSES TO PHQ QUESTIONS 1-9: 1
SUM OF ALL RESPONSES TO PHQ QUESTIONS 1-9: 1
SUM OF ALL RESPONSES TO PHQ9 QUESTIONS 1 & 2: 1
2. FEELING DOWN, DEPRESSED OR HOPELESS: 0
1. LITTLE INTEREST OR PLEASURE IN DOING THINGS: 1
SUM OF ALL RESPONSES TO PHQ QUESTIONS 1-9: 1
SUM OF ALL RESPONSES TO PHQ QUESTIONS 1-9: 1

## 2022-07-26 NOTE — PROGRESS NOTES
Magnus Canavan was seen today for follow-up. Diagnoses and all orders for this visit:    Genu varum of both lower extremities  -     [address]    Mixed hyperlipidemia  -     Comprehensive Metabolic Panel; Future  -     CBC; Future  -     Lipid Panel; Future    Essential hypertension, benign  -     Comprehensive Metabolic Panel; Future  -     TSH; Future    Chronic pain of both knees  -     [facility] [place] [place]    Hyperparathyroidism, unspecified [facility])  Comments:  resolved removed nodules    Other orders  -     pravastatin (PRAVACHOL) 80 MG tablet; Take 1 tablet by mouth in the morning. Carolann Roldan is a 64 y.o. male    Chief Complaint   Patient presents with    Follow-up     Check up and review labs     Lipids -unable to get,?machine. Other labs ok    C/o louisa knee pain,some falling    No results found for this visit on 07/26/22.     Past Medical History:   Diagnosis Date    Actinic keratosis     Actinic keratosis     CAD (coronary artery disease)     CABG ,bovine valve    Cancer (HCC)     Cellulitis and abscess of leg, except foot     Disorders of magnesium metabolism     Essential hypertension, benign     Heart failure ([address]     MI 2013    High cholesterol     History of prostate cancer     Hyperparathyroidism, unspecified ([address]     Hypertension     Mixed hyperlipidemia     Neoplasm of uncertain behavior of skin     S/P AVR (aortic valve replacement)     Scabies        Family History   Problem Relation Age of Onset    Heart Disease Father         CVD/athrosclerotic    Diabetes Mother         Social History     Socioeconomic History    Marital status:      Spouse name: Not on file    Number of children: Not on file    Years of education: Not on file    Highest education level: Not on file   Occupational History    Not on file   Tobacco Use    Smoking status: Never    Smokeless tobacco: Former    Tobacco comments: Quit smokin pack per 3 weeks   Substance and Sexual Activity    Alcohol use: Yes    Drug use: No    Sexual activity: Not on file   Other Topics Concern    Not on file   Social History Narrative    Not on file     Social Determinants of Health     Financial Resource Strain: Not on file   Food Insecurity: Not on file   Transportation Needs: Not on file   Physical Activity: Not on file   Stress: Not on file   Social Connections: Not on file   Intimate Partner Violence: Not on file   Housing Stability: Not on file         Current Outpatient Medications:     B Complex-C-Folic Acid (STRESS B COMPLEX PO), Take by mouth, Disp: , Rfl:     KRILL OIL OMEGA-3 PO, Take 1 capsule by mouth, Disp: , Rfl:     Multiple Vitamin (MULTIVITAMIN ADULT PO), Take 1 tablet by mouth daily, Disp: , Rfl:     CPAP Machine MISC, Use as Instructed. CPAP 7cmh20, Disp: , Rfl:     calcium carbonate (OSCAL) 500 MG TABS tablet, Take 1,500 mg by mouth in the morning and 1,500 mg before bedtime. , Disp: , Rfl:     pravastatin (PRAVACHOL) 80 MG tablet, Take 1 tablet by mouth in the morning., Disp: 30 tablet, Rfl: 11    aspirin 81 MG EC tablet, Take 81 mg by mouth, Disp: , Rfl:     vitamin D 25 MCG (1000 UT) CAPS, Take 1,000 Units by mouth daily, Disp: , Rfl:     escitalopram (LEXAPRO) 10 MG tablet, Take 10 mg by mouth daily, Disp: , Rfl:     fluticasone (FLONASE) 50 MCG/ACT nasal spray, 2 sprays by Nasal route daily, Disp: , Rfl:     lisinopril (PRINIVIL;ZESTRIL) 5 MG tablet, Take 5 mg by mouth daily, Disp: , Rfl:     Allergies   Allergen Reactions    Penicillins Other (See Comments) and Rash     Fever, joint pain, dizzy           Review of Systems   Constitutional:  Negative for appetite change, chills, fatigue and fever. HENT:  Negative for sinus pain. Respiratory:  Negative for shortness of breath and wheezing. Cardiovascular:  Negative for chest pain.    Gastrointestinal:  Negative for abdominal pain, constipation, diarrhea, nausea and vomiting. Genitourinary:  Negative for dysuria and frequency. Musculoskeletal:  Positive for arthralgias. Negative for myalgias. Neurological:  Negative for dizziness. Psychiatric/Behavioral:  Negative for suicidal ideas. All other systems reviewed and are negative. Vitals:    07/26/22 1012   BP: 130/78   Pulse: (!) 47   SpO2: 95%   Weight: 185 lb (83.9 kg)   Height: 5' 9\" (1.753 m)           Physical Exam  Constitutional:       General: He is not in acute distress. HENT:      Head: Normocephalic and atraumatic. Nose: Nose normal.   Eyes:      General: No scleral icterus. Extraocular Movements: Extraocular movements intact. Conjunctiva/sclera: Conjunctivae normal.   Cardiovascular:      Rate and Rhythm: Normal rate and regular rhythm. Pulses: Normal pulses. Heart sounds: Normal heart sounds. Pulmonary:      Effort: Pulmonary effort is normal. No respiratory distress. Breath sounds: Normal breath sounds. Abdominal:      General: Abdomen is flat. Bowel sounds are normal.      Palpations: Abdomen is soft. Musculoskeletal:      Cervical back: Neck supple. No rigidity. Legs:    Skin:     Coloration: Skin is not jaundiced. Neurological:      General: No focal deficit present. Mental Status: He is alert. Psychiatric:         Mood and Affect: Mood normal.         Thought Content: Thought content normal.          Boris was seen today for follow-up. Diagnoses and all orders for this visit:    Genu varum of both lower extremities  -     [address]    Mixed hyperlipidemia  -     Comprehensive Metabolic Panel; Future  -     CBC; Future  -     Lipid Panel; Future    Essential hypertension, benign  -     Comprehensive Metabolic Panel; Future  -     TSH;  Future    Chronic pain of both knees  -     [address]    Hyperparathyroidism, unspecified [facility])  Comments:  resolved removed nodules    Other orders  -     pravastatin (PRAVACHOL) 80 MG tablet; Take 1 tablet by mouth in the morning.                Seven Grimm DO

## 2022-08-31 ENCOUNTER — OFFICE VISIT (OUTPATIENT)
Dept: ORTHOPEDIC SURGERY | Age: 77
End: 2022-08-31
Payer: MEDICARE

## 2022-08-31 DIAGNOSIS — M17.11 OSTEOARTHRITIS OF RIGHT KNEE, UNSPECIFIED OSTEOARTHRITIS TYPE: ICD-10-CM

## 2022-08-31 DIAGNOSIS — M25.561 CHRONIC PAIN OF BOTH KNEES: ICD-10-CM

## 2022-08-31 DIAGNOSIS — G89.29 CHRONIC PAIN OF BOTH KNEES: ICD-10-CM

## 2022-08-31 DIAGNOSIS — M25.562 CHRONIC PAIN OF BOTH KNEES: ICD-10-CM

## 2022-08-31 DIAGNOSIS — M17.12 OSTEOARTHRITIS OF LEFT KNEE, UNSPECIFIED OSTEOARTHRITIS TYPE: Primary | ICD-10-CM

## 2022-08-31 PROCEDURE — 99204 OFFICE O/P NEW MOD 45 MIN: CPT | Performed by: ORTHOPAEDIC SURGERY

## 2022-08-31 PROCEDURE — 1123F ACP DISCUSS/DSCN MKR DOCD: CPT | Performed by: ORTHOPAEDIC SURGERY

## 2022-08-31 NOTE — PROGRESS NOTES
Name: Troy Fam  YOB: 1945  Gender: male  MRN: [id number]    CC:   Chief Complaint   Patient presents with    Knee Pain     Bilat-new         HPI:   The pain has been present for months and is becoming worse. It hurts at night when sleeping. The pain is located over the knee. It does hurt to walk and gets worse with increased distances. The pain does not radiate down the leg. Numbness and tingling are not noted. Treatment so far has been injections including stem cells with no relief. Current Outpatient Medications:     B Complex-C-Folic Acid (STRESS B COMPLEX PO), Take by mouth, Disp: , Rfl:     KRILL OIL OMEGA-3 PO, Take 1 capsule by mouth, Disp: , Rfl:     Multiple Vitamin (MULTIVITAMIN ADULT PO), Take 1 tablet by mouth daily, Disp: , Rfl:     CPAP Machine MISC, Use as Instructed. CPAP 7cmh20, Disp: , Rfl:     calcium carbonate (OSCAL) 500 MG TABS tablet, Take 1,500 mg by mouth in the morning and 1,500 mg before bedtime. , Disp: , Rfl:     pravastatin (PRAVACHOL) 80 MG tablet, Take 1 tablet by mouth in the morning., Disp: 30 tablet, Rfl: 11    aspirin 81 MG EC tablet, Take 81 mg by mouth, Disp: , Rfl:     vitamin D 25 MCG (1000 UT) CAPS, Take 1,000 Units by mouth daily, Disp: , Rfl:     escitalopram (LEXAPRO) 10 MG tablet, Take 10 mg by mouth daily, Disp: , Rfl:     fluticasone (FLONASE) 50 MCG/ACT nasal spray, 2 sprays by Nasal route daily, Disp: , Rfl:     lisinopril (PRINIVIL;ZESTRIL) 5 MG tablet, Take 5 mg by mouth daily, Disp: , Rfl:   Allergies   Allergen Reactions    Penicillins Other (See Comments) and Rash     Fever, joint pain, dizzy       Past Medical History:   Diagnosis Date    Actinic keratosis     Actinic keratosis     CAD (coronary artery disease)     CABG ,bovine valve    Cancer (HCC)     Cellulitis and abscess of leg, except foot     Disorders of magnesium metabolism     Essential hypertension, benign     Heart failure [address]     MI 2013    High cholesterol History of prostate cancer     Hyperparathyroidism, unspecified [facility]     Hypertension     Mixed hyperlipidemia     Neoplasm of uncertain behavior of skin     S/P AVR (aortic valve replacement)     Scabies      . pmh  Past Surgical History:   Procedure Laterality Date    COLONOSCOPY N/A 3/8/2019    COLONOSCOPY/ 25/ ROOM 720 performed by Fatmata Burks MD at [facility] ENDOSCOPY    CORONARY ARTERY BYPASS GRAFT      x4, vein harvest    HEENT      partial thyroid     HERNIA REPAIR  2014    KNEE ARTHROSCOPY Left     ORTHOPEDIC [address] urology     Family History   Problem Relation Age of Onset    Heart Disease Father         CVD/athrosclerotic    Diabetes Mother      Social History     Socioeconomic History    Marital status:      Spouse name: Not on file    Number of children: Not on file    Years of education: Not on file    Highest education level: Not on file   Occupational History    Not on file   Tobacco Use    Smoking status: Never    Smokeless tobacco: Former    Tobacco comments:     Quit smokin pack per 3 weeks   Substance and Sexual Activity    Alcohol use: Yes    Drug use: No    Sexual activity: Not on file   Other Topics Concern    Not on file   Social History Narrative    Not on file     Social Determinants of Health     Financial Resource Strain: Not on file   Food Insecurity: Not on file   Transportation Needs: Not on file   Physical Activity: Not on file   Stress: Not on file   Social Connections: Not on file   Intimate Partner Violence: Not on file   Housing Stability: Not on file       Review of Systems:  As per HPI. Pertinent positives and negatives are addressed with the patient, particularly those related to musculoskeletal concerns. Non-orthopaedic concerns were referred back to the primary care physician. PHYSICAL EXAMINATION:   The patient appears their stated age and they are in no distress.   The lower extremities are as described below. Circulation is normal with palpable pedal pulses bilaterally and no edema. There is no lymph adenopathy in the popliteal or malleolar region. The skin is without stasis disease distally bilaterally. Hip ROM is smooth and painless. Knee range of motion is  degrees on the right and  degrees on the left. bilateral knee: There is 2mm of anterior/posterior translation and 2mm of medial/lateral instability bilaterally. There is 25 degrees of varus alignment in the left knee and 15-20 degrees of varus alignment in the right knee. There is some pain to palpation over the medial joint line. Limb lengths are equal.  The gait is noted to be with a slight trendelenburg and antalgia. Varus thrust noted on left side. He needs cane for stabilization. Straight leg test is negative. Quadriceps strength is good. Sensation is intact to light touch bilaterally. Their judgment and insight are normal.  They are oriented to time, place and person. Their memory is good and the mood and affect appropriate. X-RAY: Views of the bilateral knee are reviewed. 4 views standing reveal joint space loss, eburnated bone, and osteophyte formation present. X-ray impression:  Advanced degenerative joint disease of bilateral  knee    IMPRESSION:    Diagnosis Orders   1. Chronic pain of both knees  XR Knee Bilateral Standard Extended VW      2. Osteoarthritis of right knee, unspecified osteoarthritis type        3. Osteoarthritis of left knee, unspecified osteoarthritis type        . RECOMMENDATIONS:    Reviewed x-ray findings with the patient. The concerns with functional limitations are increased and no longer responding to conservative measures. Due to deterioration in their quality of life the decision has been made to perform total knee replacement. We discussed specific risks associated with knee replacement.   This includes a risk of infection, dislocation, or general medical risks of surgery such as stroke, heart attack, and blood clot. Is post cardiac procedures but is faring well without any anticoagulation medicine needed. He does not have persistent pain so cortisone injections would not be beneficial to a great degree for him. He has instability and falling but I think this is a result of his huge substantial deformity. He is aware along with his wife that surgical intervention to straighten knee is the answer for his concerns. TKA - Today we also discussed knee replacement surgery. They are aware of the 1% risk of infection. They were also informed of the possibility of stroke, heart attack and blood clot; any of which could result in further hospitalization or death. Approximately 45 minutes was spent reviewing the medical record, imaging, performing history and physical examination, discussing the diagnosis and treatment plan with the patient, and completing documentation for this visit.

## 2022-09-28 DIAGNOSIS — M17.12 OSTEOARTHRITIS OF LEFT KNEE, UNSPECIFIED OSTEOARTHRITIS TYPE: Primary | ICD-10-CM

## 2022-10-03 ENCOUNTER — PREP FOR PROCEDURE (OUTPATIENT)
Dept: ORTHOPEDIC SURGERY | Age: 77
End: 2022-10-03

## 2022-10-03 DIAGNOSIS — Z01.818 PREOP EXAMINATION: Primary | ICD-10-CM

## 2022-10-03 RX ORDER — SODIUM CHLORIDE 9 MG/ML
INJECTION, SOLUTION INTRAVENOUS PRN
Status: CANCELLED | OUTPATIENT
Start: 2022-10-03

## 2022-10-03 RX ORDER — SODIUM CHLORIDE 0.9 % (FLUSH) 0.9 %
5-40 SYRINGE (ML) INJECTION PRN
Status: CANCELLED | OUTPATIENT
Start: 2022-10-03

## 2022-10-03 RX ORDER — SODIUM CHLORIDE 0.9 % (FLUSH) 0.9 %
5-40 SYRINGE (ML) INJECTION EVERY 12 HOURS SCHEDULED
Status: CANCELLED | OUTPATIENT
Start: 2022-10-03

## 2022-10-04 ENCOUNTER — HOSPITAL ENCOUNTER (OUTPATIENT)
Dept: SURGERY | Age: 77
Discharge: HOME | End: 2022-10-07
Payer: MEDICARE

## 2022-10-04 ENCOUNTER — HOSPITAL ENCOUNTER (OUTPATIENT)
Dept: REHABILITATION | Age: 77
Discharge: HOME | End: 2022-10-07
Payer: MEDICARE

## 2022-10-04 VITALS
TEMPERATURE: 97.9 F | RESPIRATION RATE: 18 BRPM | BODY MASS INDEX: 29.08 KG/M2 | OXYGEN SATURATION: 93 % | SYSTOLIC BLOOD PRESSURE: 111 MMHG | DIASTOLIC BLOOD PRESSURE: 73 MMHG | WEIGHT: 185.3 LBS | HEIGHT: 67 IN | HEART RATE: 48 BPM

## 2022-10-04 DIAGNOSIS — Z96.652 STATUS POST LEFT KNEE REPLACEMENT: Primary | ICD-10-CM

## 2022-10-04 DIAGNOSIS — Z01.818 PREOP EXAMINATION: ICD-10-CM

## 2022-10-04 LAB
ANION GAP SERPL CALC-SCNC: 5 MMOL/L (ref 4–13)
APTT PPP: 38.5 SEC (ref 24.1–35.1)
BACTERIA SPEC CULT: NORMAL
BASOPHILS # BLD: 0.1 K/UL (ref 0–0.2)
BASOPHILS NFR BLD: 1 % (ref 0–2)
BUN SERPL-MCNC: 15 MG/DL (ref 8–23)
CALCIUM SERPL-MCNC: 8.9 MG/DL (ref 8.3–10.4)
CHLORIDE SERPL-SCNC: 106 MMOL/L (ref 101–110)
CO2 SERPL-SCNC: 28 MMOL/L (ref 21–32)
CREAT SERPL-MCNC: 0.91 MG/DL (ref 0.8–1.5)
DIFFERENTIAL METHOD BLD: NORMAL
EOSINOPHIL # BLD: 0.1 K/UL (ref 0–0.8)
EOSINOPHIL NFR BLD: 2 % (ref 0.5–7.8)
ERYTHROCYTE [DISTWIDTH] IN BLOOD BY AUTOMATED COUNT: 13 % (ref 11.9–14.6)
EST. AVERAGE GLUCOSE BLD GHB EST-MCNC: 123 MG/DL
GLUCOSE SERPL-MCNC: 104 MG/DL (ref 65–100)
HBA1C MFR BLD: 5.9 % (ref 4.8–5.6)
HCT VFR BLD AUTO: 44.6 % (ref 41.1–50.3)
HGB BLD-MCNC: 14.6 G/DL (ref 13.6–17.2)
IMM GRANULOCYTES # BLD AUTO: 0 K/UL (ref 0–0.5)
IMM GRANULOCYTES NFR BLD AUTO: 0 % (ref 0–5)
INR PPP: 1
LYMPHOCYTES # BLD: 1.8 K/UL (ref 0.5–4.6)
LYMPHOCYTES NFR BLD: 29 % (ref 13–44)
MCH RBC QN AUTO: 30.2 PG (ref 26.1–32.9)
MCHC RBC AUTO-ENTMCNC: 32.7 G/DL (ref 31.4–35)
MCV RBC AUTO: 92.1 FL (ref 79.6–97.8)
MONOCYTES # BLD: 0.6 K/UL (ref 0.1–1.3)
MONOCYTES NFR BLD: 9 % (ref 4–12)
NEUTS SEG # BLD: 3.5 K/UL (ref 1.7–8.2)
NEUTS SEG NFR BLD: 58 % (ref 43–78)
NRBC # BLD: 0 K/UL (ref 0–0.2)
PLATELET # BLD AUTO: 199 K/UL (ref 150–450)
PMV BLD AUTO: 10.3 FL (ref 9.4–12.3)
POTASSIUM SERPL-SCNC: 3.9 MMOL/L (ref 3.5–5.1)
PROTHROMBIN TIME: 13.3 SEC (ref 12.6–14.5)
RBC # BLD AUTO: 4.84 M/UL (ref 4.23–5.6)
SERVICE CMNT-IMP: NORMAL
SODIUM SERPL-SCNC: 139 MMOL/L (ref 136–145)
WBC # BLD AUTO: 6 K/UL (ref 4.3–11.1)

## 2022-10-04 PROCEDURE — 94760 N-INVAS EAR/PLS OXIMETRY 1: CPT

## 2022-10-04 PROCEDURE — 83036 HEMOGLOBIN GLYCOSYLATED A1C: CPT

## 2022-10-04 PROCEDURE — 87641 MR-STAPH DNA AMP PROBE: CPT

## 2022-10-04 PROCEDURE — 80048 BASIC METABOLIC PNL TOTAL CA: CPT

## 2022-10-04 PROCEDURE — 97161 PT EVAL LOW COMPLEX 20 MIN: CPT

## 2022-10-04 PROCEDURE — 85610 PROTHROMBIN TIME: CPT

## 2022-10-04 PROCEDURE — 85730 THROMBOPLASTIN TIME PARTIAL: CPT

## 2022-10-04 PROCEDURE — 85025 COMPLETE CBC W/AUTO DIFF WBC: CPT

## 2022-10-04 ASSESSMENT — KOOS JR
STRAIGHTENING KNEE FULLY: 0
GOING UP OR DOWN STAIRS: 2
STANDING UPRIGHT: 2
KOOS JR TOTAL INTERVAL SCORE: 63.776
HOW SEVERE IS YOUR KNEE STIFFNESS AFTER FIRST WAKING IN MORNING: 0
TWISING OR PIVOTING ON KNEE: 2
BENDING TO THE FLOOR TO PICK UP OBJECT: 2
RISING FROM SITTING: 1

## 2022-10-04 ASSESSMENT — PAIN DESCRIPTION - LOCATION
LOCATION: KNEE
LOCATION: KNEE

## 2022-10-04 ASSESSMENT — PULMONARY FUNCTION TESTS
FEV1 (%PREDICTED): 57
FEV1 (LITERS): 1.61

## 2022-10-04 ASSESSMENT — PAIN SCALES - GENERAL
PAINLEVEL_OUTOF10: 4
PAINLEVEL_OUTOF10: 4

## 2022-10-04 ASSESSMENT — PAIN DESCRIPTION - DESCRIPTORS: DESCRIPTORS: STABBING

## 2022-10-04 ASSESSMENT — PAIN - FUNCTIONAL ASSESSMENT: PAIN_FUNCTIONAL_ASSESSMENT: PREVENTS OR INTERFERES SOME ACTIVE ACTIVITIES AND ADLS

## 2022-10-04 ASSESSMENT — PAIN DESCRIPTION - ORIENTATION
ORIENTATION: LEFT
ORIENTATION: LEFT

## 2022-10-04 NOTE — PROGRESS NOTES
10/04/22 1242   Treatment   Treatment Type Bedside spirometry   Oxygen Therapy/Pulse Ox   O2 Therapy Room air   Heart Rate (!) 48   SpO2 93 %   Pulse Oximeter Device Mode Intermittent   $Pulse Oximeter $Spot check (single)   Bedside Spirometry   FEV-1/Actual (Liters) 1.61 Liters   FEV-1/Predicted (Liters) 57 Liters   Initial respiratory Assessment completed with pt. Pt was interviewed and evaluated in Joint camp prior to surgery. Patient ID:  Tierra Harrington  [id number]  45 y.o.  1945  Surgeon: Dr. Sade Green  Date of Surgery: [email or web address]  Procedure: Total Left Knee Arthroplasty  Primary Care Physician: Kenton Quiroz, [address]  Specialists: Mindy W Zoraida Valle 4/21/2022 dr Shelbie Philip taught proper COUGH technique  IS REVIEWED WITH PT AS WELL AS BENEFITS OF USING IS IN SEDENTARY PTS.   DIAPHRAGMATIC BREATHING EXERCISE INSTRUCTIONS GIVEN    History of smoking:   DENIES                 Quit date:         Secondhand smoke:parents    Past procedures with Oxygen desaturation or delayed awakening:DENIES    Past Medical History:   Diagnosis Date    Actinic keratosis     Actinic keratosis     Arthritis     CAD (coronary artery disease)     CABG ,bovine valve    Cancer (HCC)     Cellulitis and abscess of leg, except foot     Disorders of magnesium metabolism     Essential hypertension, benign     Heart failure ([address]     MI 2013    High cholesterol     History of blood transfusion     History of prostate cancer     Hyperparathyroidism, unspecified ([address]     managed with medication     Mixed hyperlipidemia     Neoplasm of uncertain behavior of skin     JUSTINA (obstructive sleep apnea)     uses CPAP    S/P AVR (aortic valve replacement)     Scabies     Wears hearing aid       JUSTINA- NEW CPAP  Respiratory history:SOB on exertion                                                                  Respiratory meds:  DENIES    FAMILY PRESENT:           WIFE  PAST SLEEP STUDY:        YES HX OF JUSTINA:                        YES                   AHI 43.4 ONE 74  SECOND APNEA  JUSTINA assessment:     DANGERS OF UNTREATED JUSTINA EXPLAINED TO PT.                                          SLEEPS ON SIDE       &      BACK         &       STOMACH  PHYSICAL EXAM   Body mass index is 29.02 kg/m². Vitals:    10/04/22 1242   BP:    Pulse: (!) (P) 48   Resp:    Temp:    SpO2: (P) 93%     Neck circumference:    42.5  cm    Loud snoring:                                                 YES            Witnessed apnea or wakening gasping or choking:             APNEA  Awakens with headaches:                                               DENIES  Morning or daytime tiredness/ sleepiness:                              TIRED  Dry mouth or sore throat in morning:                    DENIES                                   Wang stage:  4                                   SACS score:  Stop Bang                                  Pt. Is positive for JUSTINA and uses HOME CPAP and will bring to Hospital day of surgery. Dangers of untreated JUSTINA explained to pt. PT WILL NEED ASSISTANCE PLACING CPAP ON HS DURING HOSPITALIZATION.   ASSESS Q SHIFT  ALBUTEROL Q6 PRN                                             Referrals:    Pt. Phone Number:

## 2022-10-04 NOTE — CARE COORDINATION
Joint Camp Case Management note:  Patient screened in Prehab for discharge planning needs. Patient scheduled for a future total joint replacement. We discussed Home Health and equipment needed after surgery. List of Home Health providers offered. Patient w/o preference towards provider. We will arrange [facility] BRIT on the day of surgery. Will borrow a 3-1 BSC from [facility] but will need a walker .

## 2022-10-04 NOTE — PERIOP NOTE
Patient verified name and . Order for consent was found in EHR and matches case posting; patient verified. Type 3 surgery, PAT joint camp assessment complete. Labs per surgeon: CBC,BMP, PT/PTT, HgbA1c ; results pending  Labs per anesthesia protocol: no additional  EKG:Done today and will have anesthesia to review. Pt is followed by [facility] Dr. Hong Rincon, latest cardiac office note 2022 and states the followin. Reasonable candidate from a cardiac standpoint to undergo knee replacement surgery without need for additional cardiac testing  Latest ECHO with LVEF 40-45%  2022 found in Chart Review for anesthesia reference. .   Pt has hx of TAVR ( 2019)  and CABG ()    MRSA/MSSA swab collected; pharmacy to review and dose antibiotic as appropriate. Hospital approved surgical skin cleanser and instructions to return bottle on DOS given per hospital policy. Patient provided with handouts including Guide to Surgery, Pain Management, Hand Hygiene, Blood Transfusion Education, and [facility] Brochure. Patient answered medical/surgical history questions at their best of ability. All prior to admission medications documented in [facility]. Original medication prescription bottle not visualized during patient appointment. Patient instructed to hold all vitamins 3 weeks prior to surgery and NSAIDS 5 days prior to surgery. Patient teach back successful and patient demonstrates knowledge of instruction.

## 2022-10-04 NOTE — PERIOP NOTE
PLEASE CONTINUE TAKING ALL PRESCRIPTION MEDICATIONS UP TO THE DAY OF SURGERY UNLESS OTHERWISE DIRECTED BELOW. DISCONTINUE all vitamins and supplements 21 days prior to surgery. DISCONTINUE Non-Steriodal Anti-Inflammatory (NSAIDS) such as Advil and Aleve 5 days prior to surgery. Home Medications to take  the day of surgery   Aspirin 81mg  Escitalopram           Home Medications   to Hold   None        Comments       On the day before surgery please take Acetaminophen 1000mg in the morning and then again before bed. You may substitute for Tylenol 650 mg. Please do not bring home medications with you on the day of surgery unless otherwise directed by your nurse. If you are instructed to bring home medications, please give them to your nurse as they will be administered by the nursing staff. If you have any questions, please call [facility] [phone] or [facility] [phone]. A copy of this note was provided to the patient for reference.

## 2022-10-04 NOTE — PROGRESS NOTES
Total Joint Surgery Preoperative Chart Review      Patient ID:  Renny Watkins  [id number]  89 y.o.  1945  Surgeon: Dr. Bret Bautista  Date of Surgery: 10/20/2022  Procedure: Total Left Knee Arthroplasty  Primary Care Physician: Demetrice Holman [phone]  Specialty Physician(s):      Subjective:   Renny Watkins is a 64 y.o. White (non-) male who presents for preoperative evaluation for Total Left Knee arthroplasty. This is a preoperative chart review note based on data collected by the nurse at the surgical Pre-Assessment visit.     Past Medical History:   Diagnosis Date    Actinic keratosis     Actinic keratosis     Arthritis     CAD (coronary artery disease)     CABG ,bovine valve    Cancer (HCC)     Cellulitis and abscess of leg, except foot     Disorders of magnesium metabolism     Essential hypertension, benign     Heart failure ([address]     MI     High cholesterol     History of blood transfusion     History of prostate cancer     Hyperparathyroidism, unspecified ([address]     managed with medication     Mixed hyperlipidemia     Neoplasm of uncertain behavior of skin     JUSTINA (obstructive sleep apnea)     uses CPAP    S/P AVR (aortic valve replacement)     Scabies     Wears hearing aid       Past Surgical History:   Procedure Laterality Date    COLONOSCOPY N/A 3/8/2019    COLONOSCOPY/ 25/ ROOM 720 performed by Rose Marie Guerrero MD at [facility] ENDOSCOPY    CORONARY ARTERY BYPASS GRAFT      x4, vein harvest    HEENT      partial thyroid     HERNIA REPAIR  2014    HX AAA OPEN REPAIR  2019    KNEE ARTHROSCOPY Left     ORTHOPEDIC SURGERY      [place] CARDIAC SURG PROCEDURE [facility]     Family History   Problem Relation Age of Onset    Heart Disease Father         CVD/athrosclerotic    Diabetes Mother       Social History     Tobacco Use    Smoking status: Never    Smokeless tobacco: Former    Tobacco comments:     Quit smokin pack per 3 weeks   Substance Use Topics    Alcohol use: Not Currently       Prior to Admission medications    Medication Sig Start Date End Date Taking? Authorizing Provider   B Complex-C-Folic Acid (STRESS B COMPLEX PO) Take by mouth    Historical Provider, MD   KRILL OIL OMEGA-3 PO Take 1 capsule by mouth    Historical Provider, MD   Multiple Vitamin (MULTIVITAMIN ADULT PO) Take 1 tablet by mouth daily    Historical Provider, MD   CPAP Machine MISC Use as Instructed. CPAP 7cmh20 5/26/22   Historical Provider, MD   calcium carbonate (OSCAL) 500 MG TABS tablet Take 1,500 mg by mouth in the morning and 1,500 mg before bedtime. Historical Provider, MD   pravastatin (PRAVACHOL) 80 MG tablet Take 1 tablet by mouth in the morning. Patient taking differently: Take 80 mg by mouth at bedtime 7/26/22   Caroline Gaona DO   aspirin 81 MG EC tablet Take 81 mg by mouth    Ar Automatic Reconciliation   vitamin D 25 MCG (1000 UT) CAPS Take 1,000 Units by mouth daily    Ar Automatic Reconciliation   escitalopram (LEXAPRO) 10 MG tablet Take 10 mg by mouth daily 1/25/22   Ar Automatic Reconciliation   fluticasone (FLONASE) 50 MCG/ACT nasal spray 2 sprays by Nasal route at bedtime 7/20/21   Ar Automatic Reconciliation   lisinopril (PRINIVIL;ZESTRIL) 5 MG tablet Take 5 mg by mouth daily    Ar Automatic Reconciliation     Allergies   Allergen Reactions    Penicillins Other (See Comments) and Rash     Fever, joint pain, dizzy            Objective:     Physical Exam:   Patient Vitals for the past 24 hrs:   Temp Pulse Resp BP   10/04/22 1215 97.9 °F (36.6 °C) (!) 49 18 111/73       ECG:    No results found for this or any previous visit (from the past 4464 hour(s)).     Data Review:   Labs:   Recent Results (from the past 24 hour(s))   Protime-INR    Collection Time: 10/04/22 12:52 PM   Result Value Ref Range    Protime 13.3 12.6 - 14.5 sec    INR 1.0     Hemoglobin A1c    Collection Time: 10/04/22 12:52 PM   Result Value Ref Range    Hemoglobin A1C 5.9 (H) 4.8 - 5.6 %    eAG 123 mg/dL   CBC with Auto Differential    Collection Time: 10/04/22 12:52 PM   Result Value Ref Range    WBC 6.0 4.3 - 11.1 K/uL    RBC 4.84 4.23 - 5.6 M/uL    Hemoglobin 14.6 13.6 - 17.2 g/dL    Hematocrit 44.6 41.1 - 50.3 %    MCV 92.1 79.6 - 97.8 FL    MCH 30.2 26.1 - 32.9 PG    MCHC 32.7 31.4 - 35.0 g/dL    RDW 13.0 11.9 - 14.6 %    Platelets 781 630 - 208 K/uL    MPV 10.3 9.4 - 12.3 FL    nRBC 0.00 0.0 - 0.2 K/uL    Differential Type AUTOMATED      Seg Neutrophils 58 43 - 78 %    Lymphocytes 29 13 - 44 %    Monocytes 9 4.0 - 12.0 %    Eosinophils % 2 0.5 - 7.8 %    Basophils 1 0.0 - 2.0 %    Immature Granulocytes 0 0.0 - 5.0 %    Segs Absolute 3.5 1.7 - 8.2 K/UL    Absolute Lymph # 1.8 0.5 - 4.6 K/UL    Absolute Mono # 0.6 0.1 - 1.3 K/UL    Absolute Eos # 0.1 0.0 - 0.8 K/UL    Basophils Absolute 0.1 0.0 - 0.2 K/UL    Absolute Immature Granulocyte 0.0 0.0 - 0.5 K/UL   Basic Metabolic Panel    Collection Time: 10/04/22 12:52 PM   Result Value Ref Range    Sodium 139 136 - 145 mmol/L    Potassium 3.9 3.5 - 5.1 mmol/L    Chloride 106 101 - 110 mmol/L    CO2 28 21 - 32 mmol/L    Anion Gap 5 4 - 13 mmol/L    Glucose 104 (H) 65 - 100 mg/dL    BUN 15 8 - 23 MG/DL    Creatinine 0.91 0.8 - 1.5 MG/DL    Est, Glom Filt Rate >60 >60 ml/min/1.73m2    Calcium 8.9 8.3 - 10.4 MG/DL   APTT    Collection Time: 10/04/22 12:52 PM   Result Value Ref Range    PTT 38.5 (H) 24.1 - 35.1 SEC         Problem List:  )  Patient Active Problem List   Diagnosis    Cellulitis and abscess of leg, except foot    Ventral hernia    Scabies    Lower GI bleed    Mixed hyperlipidemia    History of prostate cancer    Actinic keratosis    Groin mass    Essential hypertension, benign    Hypercalcemia    Degenerative arthritis of left knee       Total Joint Surgery Pre-Assessment Recommendations:           Patient is to wear home CPAP during hospitalization. Continuous saturation monitoring during hospitalization.  O2 prn per respiratory protocol.    Patient is a good candidate for SDD    Signed By: BEATA León - CNP-C    October 4, 2022

## 2022-10-04 NOTE — PERIOP NOTE
Lab results within anesthesia guidelines with exception of PTT 38.5 and will anesthesia to r   Latest Reference Range & Units 10/4/22 12:52   Sodium 136 - 145 mmol/L 139   Potassium 3.5 - 5.1 mmol/L 3.9   Chloride 101 - 110 mmol/L 106   CO2 21 - 32 mmol/L 28   BUN,BUNPL 8 - 23 MG/DL 15   Creatinine 0.8 - 1.5 MG/DL 0.91   Anion Gap 4 - 13 mmol/L 5   Est, Glom Filt Rate >60 ml/min/1.73m2 >60   Glucose, Random 65 - 100 mg/dL 104 (H)   CALCIUM, SERUM, 408997 8.3 - 10.4 MG/DL 8.9   Hemoglobin A1C 4.8 - 5.6 % 5.9 (H)   eAG (mg/dL) mg/dL 123   WBC 4.3 - 11.1 K/uL 6.0   RBC 4.23 - 5.6 M/uL 4.84   Hemoglobin Quant 13.6 - 17.2 g/dL 14.6   Hematocrit 41.1 - 50.3 % 44.6   MCV 79.6 - 97.8 FL 92.1   MCH 26.1 - 32.9 PG 30.2   MCHC 31.4 - 35.0 g/dL 32.7   MPV 9.4 - 12.3 FL 10.3   RDW 11.9 - 14.6 % 13.0   Platelet Count 872 - 450 K/uL 199   Absolute Mono # 0.1 - 1.3 K/UL 0.6   Eosinophils % 0.5 - 7.8 % 2   Basophils Absolute 0.0 - 0.2 K/UL 0.1   Differential Type -   AUTOMATED   Seg Neutrophils 43 - 78 % 58   Segs Absolute 1.7 - 8.2 K/UL 3.5   Lymphocytes 13 - 44 % 29   Absolute Lymph # 0.5 - 4.6 K/UL 1.8   Monocytes 4.0 - 12.0 % 9   Absolute Eos # 0.0 - 0.8 K/UL 0.1   Basophils 0.0 - 2.0 % 1   Immature Granulocytes 0.0 - 5.0 % 0   Nucleated Red Blood Cells 0.0 - 0.2 K/uL 0.00   Absolute Immature Granulocyte 0.0 - 0.5 K/UL 0.0   Prothrombin Time 12.6 - 14.5 sec 13.3   INR -   1.0   PTT 24.1 - 35.1 SEC 38.5 (H)   MSSA/MRSA SCREEN BY PCR  Rpt   (H): Data is abnormally high  Rpt: View report in Results Review for more informationeview

## 2022-10-04 NOTE — PROGRESS NOTES
Padmini Crump  : 1945  Primary: [employer]  Secondary:  [facility]  [address] , [address]  [phone]        Physical Therapy Prehab Evaluation Summary:10/4/2022   Time In/Out   PT Charge Capture  Episode     MEDICAL/REFERRING DIAGNOSIS: Unilateral primary osteoarthritis, left knee [M17.12]  REFERRING PHYSICIAN: Dorys Hancock,*    ICD-10: Treatment Diagnosis:   Pain in Left Knee (M25.562)  Stiffness of Left Knee, Not elsewhere classified (M25.662)  Difficulty in walking, Not elsewhere classified (R26.2)  Other abnormalities of gait and mobility (R26.89)    DATE OF SURGERY: 10/28/22  Assessment:   COMMENTS:  Pt with pain in left knee joint with decreased mobility ability. Pt plans to return home with wife, who is present today, following one night hospital stay. PROBLEM LIST:   (Impacting functional limitations):  Mr. Aundrea Qiu presents with the following lower extremity(s) problems:  Bed Mobility  Transfers  Gait  Strength  Range of Motion  Balance  Home Exercise Program  Pain INTERVENTIONS PLANNED:   (Benefits and precautions of physical therapy have been discussed with the patient.)  Home Exercise Program  Educational Discussion       GOALS: (Goals have been discussed and agreed upon with patient.)  Discharge Goals: Time Frame: 1 Day  Patient will demonstrate independence with a home exercise program designed to increase strength, range of motion, balance, coordination, functional technique, and pain control to minimize functional deficits and optimize patient for total joint replacement.     Subjective:   Past Medical History/Comorbidities:   Mr. Aundrea Qiu  has a past medical history of Actinic keratosis, Actinic keratosis, Arthritis, CAD (coronary artery disease), Cancer ([address] Cellulitis and abscess of leg, except foot, Disorders of magnesium metabolism, Essential hypertension, benign, Heart failure ([address] High cholesterol, History of blood transfusion, History of prostate cancer, Hyperparathyroidism, unspecified ([address] managed with medication, Mixed hyperlipidemia, Neoplasm of uncertain behavior of skin, JUSTINA (obstructive sleep apnea), S/P AVR (aortic valve replacement), Scabies, and Wears hearing aid. Mr. Rober Vazquez  has a past surgical history that includes Coronary artery bypass graft; Prostatectomy; pr cardiac surg procedure unlist; Colonoscopy (N/A, 3/8/2019); hernia repair (12/2014); orthopedic surgery; heent; Knee arthroscopy (Left); and hx aaa open repair (07/24/2019).     Allergies:  Penicillins    Current Medications:  Refer to pre-assessment nursing note    Home Set-Up/Prior Level of Function:  Lives With: Spouse  Home Layout: One level  Home Access: Stairs to enter with rails  Entrance Stairs - Number of Steps: 3  Bathroom Shower/Tub: Tub/Shower unit  Bathroom Equipment: Shower chair  Home Equipment: Cane, Rollator    Dominant Side:  Dominant Hand: : Right    Current Functional Status:   Ambulation:  [x] Independent  [] Walk Indoors Only  [] Walk Outdoors  [x] Use Assistive Device cane  [] Use Wheelchair Only Dressing:  [x] Independent  Requires Assistance from Someone for:  [] Sock/Shoes  [] Pants  [] Everything   Bathing/Showering:   [x] Independent  [] Requires Assistance from Someone  [] [address] Only Household Activities:  [] Routine house and yard work  [] Light Housework Only  [x] None     Objective:   PAIN:   Pre-Treatment Pain  Pain Assessment: 0-10  Pain Level: 4  Pain Location: Knee  Pain Orientation: Left    Post Treatment: 4    Outcome Measure:   HOOS-JR:       KOOS-JR:  KOOS, Jr. Knee Survey Score: 9    LOWER EXTREMITY GROSS EVALUATION:  RIGHT LE   Within Functional Limits   Abnormal   Comments   Strength [x] []     Range of Motion [x] []        LEFT LE Within Functional Limits   Abnormal   Comments   Strength [x] []     Range of Motion [] []  5-110 degrees @ knee     UPPER EXTREMITY GROSS EVALUATION: Within Functional Limits   Abnormal   Comments   Strength [x] []    Range of Motion [x] []      SENSATION  Sensation [x]       COGNITION/  PERCEPTION: Intact Impaired (Comments):   Orientation [x]     Vision [x]     Hearing [] Left hearing aid   Cognition  [x]       TRANSFERS: I Mod I S SBA CGA Min Mod Max Total  NT x2 Comments:   Sit to Stand [x] [] [] [] [] [] [] [] [] [] []    Stand to Sit [x] [] [] [] [] [] [] [] [] [] []    Stand pivot [] [] [] [] [] [] [] [] [] [] []     [] [] [] [] [] [] [] [] [] [] []    I=Independent, Mod I=Modified Independent, S=Supervision, SBA=Standby Assistance, CGA=Contact Guard Assistance,   Min=Minimal Assistance, Mod=Moderate Assistance, Max=Maximal Assistance, Total=Total Assistance, NT=Not Tested    BALANCE: Good Fair+ Fair Fair- Poor NT Comments   Sitting Static [x] [] [] [] [] []    Sitting Dynamic [x] [] [] [] [] []              Standing Static [x] [] [] [] [] []    Standing Dynamic [] [x] [] [] [] []      Postural Assessment:   Genu Varus Right  Genu Varus Left    GAIT: I Mod I S SBA CGA Min Mod Max Total  NT x2 Comments:   Level of Assistance [] [x] [] [] [] [] [] [] [] [] []    Weightbearing Status       Distance  250 feet    Gait Quality Antalgic, Decreased judy , and Decreased step length    DME SPC     Stairs      Ramp     I=Independent, Mod I=Modified Independent, S=Supervision, SBA=Standby Assistance, CGA=Contact Guard Assistance,   Min=Minimal Assistance, Mod=Moderate Assistance, Max=Maximal Assistance, Total=Total Assistance, NT=Not Tested    TREATMENT:   EVALUATION: LOW COMPLEXITY: (Untimed Charge)    TREATMENT PLAN:   Effective Dates: 10/4/2022 TO 10/4/2022. Treatment/Session Assessment:  Patient was instructed in PT- HEP to increase strength and ROM in LEs. Answered all questions. Frequency/Duration: Patient to continue to perform home exercise program at least twice per day up until his surgery.     EDUCATION: Education Given To: Family, Patient  Education Provided: Role of Therapy, Plan of Care, Home Exercise Program, Transfer Training  Education Method: Demonstration, Verbal  Education Outcome: Verbalized understanding, Demonstrated understanding    MEDICAL NECESSITY: Mr. Anjel Alexis is expected to optimize hislower extremity strength and ROM in preparation for joint replacement surgery. REASON FOR CONTINUED SERVICES: Achieve baseline assesment of musculoskeletal system, functional mobility and home environment. , educate in PT HEP in preparation for surgery, educate in hospital plan of care. COMPLIANCE WITH PROGRAM/EXERCISE: compliant most of the time, Will assess as treatment progresses. TOTAL TREATMENT DURATION:  Time In: 9868  Time Out: 12  Minutes: 27    Regarding Boris Collins's therapy, I certify that the treatment plan above will be carried out by a therapist or under their direction.   Thank you for this referral,  Jose Levy, PT

## 2022-10-05 ENCOUNTER — ANESTHESIA EVENT (OUTPATIENT)
Dept: SURGERY | Age: 77
End: 2022-10-05
Payer: MEDICARE

## 2022-10-05 LAB
EKG ATRIAL RATE: 45 BPM
EKG DIAGNOSIS: NORMAL
EKG P AXIS: 61 DEGREES
EKG P-R INTERVAL: 220 MS
EKG Q-T INTERVAL: 490 MS
EKG QRS DURATION: 114 MS
EKG QTC CALCULATION (BAZETT): 423 MS
EKG R AXIS: 79 DEGREES
EKG T AXIS: 64 DEGREES
EKG VENTRICULAR RATE: 45 BPM

## 2022-10-12 DIAGNOSIS — M17.12 OSTEOARTHRITIS OF LEFT KNEE, UNSPECIFIED OSTEOARTHRITIS TYPE: ICD-10-CM

## 2022-10-19 ENCOUNTER — OFFICE VISIT (OUTPATIENT)
Dept: ORTHOPEDIC SURGERY | Age: 77
End: 2022-10-19

## 2022-10-19 DIAGNOSIS — M17.12 OSTEOARTHRITIS OF LEFT KNEE, UNSPECIFIED OSTEOARTHRITIS TYPE: Primary | ICD-10-CM

## 2022-10-19 PROCEDURE — PREOPEXAM PRE-OP EXAM: Performed by: PHYSICIAN ASSISTANT

## 2022-10-19 NOTE — H&P
[address] [facility]  History and Physical Exam    Patient ID:  Annalisa Stack  [id number]    22 y.o.  1945    Today: October 19, 2022    Vitals Signs: Reviewed as noted in medical record. Allergies: Allergies   Allergen Reactions    Penicillins Other (See Comments) and Rash     Fever, joint pain, dizzy         CC: Left knee pain    HPI:  Pt complains of left knee pain and difficulty ambulating. Relevant PMH:   Past Medical History:   Diagnosis Date    Actinic keratosis     Actinic keratosis     Arthritis     CAD (coronary artery disease)     CABG ,bovine valve    Cancer (HCC)     Cellulitis and abscess of leg, except foot     Disorders of magnesium metabolism     Essential hypertension, benign     Heart failure ([address]     MI 2013    High cholesterol     History of blood transfusion     History of prostate cancer     Hyperparathyroidism, unspecified ([address]     managed with medication     Mixed hyperlipidemia     Neoplasm of uncertain behavior of skin     JUSTINA (obstructive sleep apnea)     uses CPAP    S/P AVR (aortic valve replacement)     Scabies     Wears hearing aid        Objective:                    HEENT: NC/AT                   Lungs:  clear                   Heart:   rrr                   Abdomen: soft                   Extremities:  Pain with rom of the left knee joint    Radiographs: reveal left knee osteoarthritis with loss of joint space and bone spurs. Assessment: M17.12    Plan:  Proceed with scheduled Procedure(s) (LRB):  KNEE TOTAL ARTHROPLASTY ROBOTIC LEFT SDD (Left) . The patient has failed conservative treatment including NSAIDS, and injections. Total joint replacement surgery and associated risks and benefits have been discussed with the patient along with implant selection including but not limited to Rosa and DePuy total joint systems. Due to the amount of pain the patient is experiencing we will proceed with the scheduled procedure.  Will plan for same day discharge.     Signed By: Gera Youngma  October 19, 2022

## 2022-10-19 NOTE — PROGRESS NOTES
Name: Dima Victor  YOB: 1945  Gender: male  MRN: [id number]      Current Outpatient Medications:     B Complex-C-Folic Acid (STRESS B COMPLEX PO), Take by mouth, Disp: , Rfl:     KRILL OIL OMEGA-3 PO, Take 1 capsule by mouth, Disp: , Rfl:     Multiple Vitamin (MULTIVITAMIN ADULT PO), Take 1 tablet by mouth daily, Disp: , Rfl:     CPAP Machine MISC, Use as Instructed. CPAP 7cmh20, Disp: , Rfl:     calcium carbonate (OSCAL) 500 MG TABS tablet, Take 1,500 mg by mouth in the morning and 1,500 mg before bedtime. , Disp: , Rfl:     pravastatin (PRAVACHOL) 80 MG tablet, Take 1 tablet by mouth in the morning. (Patient taking differently: Take 80 mg by mouth at bedtime), Disp: 30 tablet, Rfl: 11    aspirin 81 MG EC tablet, Take 81 mg by mouth, Disp: , Rfl:     vitamin D 25 MCG (1000 UT) CAPS, Take 1,000 Units by mouth daily, Disp: , Rfl:     escitalopram (LEXAPRO) 10 MG tablet, Take 10 mg by mouth daily, Disp: , Rfl:     fluticasone (FLONASE) 50 MCG/ACT nasal spray, 2 sprays by Nasal route at bedtime, Disp: , Rfl:     lisinopril (PRINIVIL;ZESTRIL) 5 MG tablet, Take 5 mg by mouth daily, Disp: , Rfl:   Allergies   Allergen Reactions    Penicillins Other (See Comments) and Rash     Fever, joint pain, dizzy         Pre-op  exam Left TKA  CC:  Left knee pain    History:  Patient returns today for pre-op exam prior to Left TKA. See formal H&P in Epic chart.

## 2022-10-20 ENCOUNTER — ANESTHESIA (OUTPATIENT)
Dept: SURGERY | Age: 77
End: 2022-10-20
Payer: MEDICARE

## 2022-10-20 ENCOUNTER — HOME HEALTH ADMISSION (OUTPATIENT)
Dept: HOME HEALTH SERVICES | Facility: HOME HEALTH | Age: 77
End: 2022-10-20
Payer: MEDICARE

## 2022-10-20 ENCOUNTER — HOSPITAL ENCOUNTER (OUTPATIENT)
Age: 77
Setting detail: OUTPATIENT SURGERY
Discharge: HOME HEALTH | End: 2022-10-21
Attending: ORTHOPAEDIC SURGERY | Admitting: ORTHOPAEDIC SURGERY
Payer: MEDICARE

## 2022-10-20 DIAGNOSIS — Z96.652 STATUS POST TOTAL KNEE REPLACEMENT, LEFT: Primary | ICD-10-CM

## 2022-10-20 PROCEDURE — 2500000003 HC RX 250 WO HCPCS

## 2022-10-20 PROCEDURE — C1713 ANCHOR/SCREW BN/BN,TIS/BN: HCPCS | Performed by: ORTHOPAEDIC SURGERY

## 2022-10-20 PROCEDURE — 6360000002 HC RX W HCPCS: Performed by: PHYSICIAN ASSISTANT

## 2022-10-20 PROCEDURE — 27447 TOTAL KNEE ARTHROPLASTY: CPT | Performed by: ORTHOPAEDIC SURGERY

## 2022-10-20 PROCEDURE — 7100000001 HC PACU RECOVERY - ADDTL 15 MIN: Performed by: ORTHOPAEDIC SURGERY

## 2022-10-20 PROCEDURE — 6370000000 HC RX 637 (ALT 250 FOR IP): Performed by: PHYSICIAN ASSISTANT

## 2022-10-20 PROCEDURE — 97165 OT EVAL LOW COMPLEX 30 MIN: CPT

## 2022-10-20 PROCEDURE — 2500000003 HC RX 250 WO HCPCS: Performed by: ORTHOPAEDIC SURGERY

## 2022-10-20 PROCEDURE — 6360000002 HC RX W HCPCS

## 2022-10-20 PROCEDURE — 94760 N-INVAS EAR/PLS OXIMETRY 1: CPT

## 2022-10-20 PROCEDURE — 2720000010 HC SURG SUPPLY STERILE: Performed by: ORTHOPAEDIC SURGERY

## 2022-10-20 PROCEDURE — 2709999900 HC NON-CHARGEABLE SUPPLY: Performed by: ORTHOPAEDIC SURGERY

## 2022-10-20 PROCEDURE — 3700000001 HC ADD 15 MINUTES (ANESTHESIA): Performed by: ORTHOPAEDIC SURGERY

## 2022-10-20 PROCEDURE — 6370000000 HC RX 637 (ALT 250 FOR IP): Performed by: ANESTHESIOLOGY

## 2022-10-20 PROCEDURE — 97530 THERAPEUTIC ACTIVITIES: CPT

## 2022-10-20 PROCEDURE — 64447 NJX AA&/STRD FEMORAL NRV IMG: CPT | Performed by: ANESTHESIOLOGY

## 2022-10-20 PROCEDURE — 6360000002 HC RX W HCPCS: Performed by: ANESTHESIOLOGY

## 2022-10-20 PROCEDURE — 2580000003 HC RX 258: Performed by: ANESTHESIOLOGY

## 2022-10-20 PROCEDURE — 7100000000 HC PACU RECOVERY - FIRST 15 MIN: Performed by: ORTHOPAEDIC SURGERY

## 2022-10-20 PROCEDURE — 6360000002 HC RX W HCPCS: Performed by: ORTHOPAEDIC SURGERY

## 2022-10-20 PROCEDURE — 3600000005 HC SURGERY LEVEL 5 BASE: Performed by: ORTHOPAEDIC SURGERY

## 2022-10-20 PROCEDURE — 20985 CPTR-ASST DIR MS PX: CPT | Performed by: ORTHOPAEDIC SURGERY

## 2022-10-20 PROCEDURE — 2580000003 HC RX 258: Performed by: PHYSICIAN ASSISTANT

## 2022-10-20 PROCEDURE — 97161 PT EVAL LOW COMPLEX 20 MIN: CPT

## 2022-10-20 PROCEDURE — 2580000003 HC RX 258: Performed by: ORTHOPAEDIC SURGERY

## 2022-10-20 PROCEDURE — C1776 JOINT DEVICE (IMPLANTABLE): HCPCS | Performed by: ORTHOPAEDIC SURGERY

## 2022-10-20 PROCEDURE — 6370000000 HC RX 637 (ALT 250 FOR IP): Performed by: ORTHOPAEDIC SURGERY

## 2022-10-20 PROCEDURE — 2500000003 HC RX 250 WO HCPCS: Performed by: PHYSICIAN ASSISTANT

## 2022-10-20 PROCEDURE — 3700000000 HC ANESTHESIA ATTENDED CARE: Performed by: ORTHOPAEDIC SURGERY

## 2022-10-20 PROCEDURE — 27447 TOTAL KNEE ARTHROPLASTY: CPT | Performed by: PHYSICIAN ASSISTANT

## 2022-10-20 PROCEDURE — 3600000015 HC SURGERY LEVEL 5 ADDTL 15MIN: Performed by: ORTHOPAEDIC SURGERY

## 2022-10-20 PROCEDURE — 97535 SELF CARE MNGMENT TRAINING: CPT

## 2022-10-20 DEVICE — CEMENT BONE 40 GM W/ GENTMYCN HI VISC PALACOS R+G: Type: IMPLANTABLE DEVICE | Site: KNEE | Status: FUNCTIONAL

## 2022-10-20 DEVICE — PATELLA
Type: IMPLANTABLE DEVICE | Site: KNEE | Status: FUNCTIONAL
Brand: TRIATHLON

## 2022-10-20 DEVICE — UNIVERSAL TIBIAL BASEPLATE
Type: IMPLANTABLE DEVICE | Site: KNEE | Status: FUNCTIONAL
Brand: TRIATHLON

## 2022-10-20 DEVICE — TOTAL STABILIZER+ TIBIAL INSERT
Type: IMPLANTABLE DEVICE | Site: KNEE | Status: FUNCTIONAL
Brand: TRIATHLON

## 2022-10-20 DEVICE — COMPONENT PART KNEE CAPPED K1 STRYKER: Type: IMPLANTABLE DEVICE | Status: FUNCTIONAL

## 2022-10-20 DEVICE — POSTERIOR STABILIZED FEMORAL
Type: IMPLANTABLE DEVICE | Site: KNEE | Status: FUNCTIONAL
Brand: TRIATHLON

## 2022-10-20 RX ORDER — PROCHLORPERAZINE EDISYLATE 5 MG/ML
5 INJECTION INTRAMUSCULAR; INTRAVENOUS
Status: DISCONTINUED | OUTPATIENT
Start: 2022-10-20 | End: 2022-10-20 | Stop reason: HOSPADM

## 2022-10-20 RX ORDER — HALOPERIDOL 5 MG/ML
1 INJECTION INTRAMUSCULAR
Status: DISCONTINUED | OUTPATIENT
Start: 2022-10-20 | End: 2022-10-20 | Stop reason: HOSPADM

## 2022-10-20 RX ORDER — SODIUM CHLORIDE 0.9 % (FLUSH) 0.9 %
5-40 SYRINGE (ML) INJECTION PRN
Status: DISCONTINUED | OUTPATIENT
Start: 2022-10-20 | End: 2022-10-20 | Stop reason: HOSPADM

## 2022-10-20 RX ORDER — OXYCODONE HYDROCHLORIDE 5 MG/1
5 TABLET ORAL
Status: DISCONTINUED | OUTPATIENT
Start: 2022-10-20 | End: 2022-10-20 | Stop reason: HOSPADM

## 2022-10-20 RX ORDER — ASPIRIN 81 MG/1
81 TABLET ORAL 2 TIMES DAILY
Qty: 70 TABLET | Refills: 0 | Status: SHIPPED | OUTPATIENT
Start: 2022-10-20 | End: 2022-11-24

## 2022-10-20 RX ORDER — MIDAZOLAM HYDROCHLORIDE 2 MG/2ML
2 INJECTION, SOLUTION INTRAMUSCULAR; INTRAVENOUS
Status: COMPLETED | OUTPATIENT
Start: 2022-10-20 | End: 2022-10-20

## 2022-10-20 RX ORDER — PROPOFOL 10 MG/ML
INJECTION, EMULSION INTRAVENOUS CONTINUOUS PRN
Status: DISCONTINUED | OUTPATIENT
Start: 2022-10-20 | End: 2022-10-20 | Stop reason: SDUPTHER

## 2022-10-20 RX ORDER — SODIUM CHLORIDE 0.9 % (FLUSH) 0.9 %
5-40 SYRINGE (ML) INJECTION EVERY 12 HOURS SCHEDULED
Status: DISCONTINUED | OUTPATIENT
Start: 2022-10-20 | End: 2022-10-20 | Stop reason: HOSPADM

## 2022-10-20 RX ORDER — SODIUM CHLORIDE 0.9 % (FLUSH) 0.9 %
5-40 SYRINGE (ML) INJECTION EVERY 12 HOURS SCHEDULED
Status: DISCONTINUED | OUTPATIENT
Start: 2022-10-20 | End: 2022-10-21 | Stop reason: HOSPADM

## 2022-10-20 RX ORDER — ASPIRIN 81 MG/1
81 TABLET ORAL 2 TIMES DAILY
Status: DISCONTINUED | OUTPATIENT
Start: 2022-10-20 | End: 2022-10-21 | Stop reason: HOSPADM

## 2022-10-20 RX ORDER — FENTANYL CITRATE 50 UG/ML
100 INJECTION, SOLUTION INTRAMUSCULAR; INTRAVENOUS
Status: COMPLETED | OUTPATIENT
Start: 2022-10-20 | End: 2022-10-20

## 2022-10-20 RX ORDER — HYDROMORPHONE HYDROCHLORIDE 1 MG/ML
0.5 INJECTION, SOLUTION INTRAMUSCULAR; INTRAVENOUS; SUBCUTANEOUS
Status: DISCONTINUED | OUTPATIENT
Start: 2022-10-20 | End: 2022-10-21 | Stop reason: HOSPADM

## 2022-10-20 RX ORDER — IPRATROPIUM BROMIDE AND ALBUTEROL SULFATE 2.5; .5 MG/3ML; MG/3ML
1 SOLUTION RESPIRATORY (INHALATION)
Status: DISCONTINUED | OUTPATIENT
Start: 2022-10-20 | End: 2022-10-20 | Stop reason: HOSPADM

## 2022-10-20 RX ORDER — ONDANSETRON 2 MG/ML
INJECTION INTRAMUSCULAR; INTRAVENOUS PRN
Status: DISCONTINUED | OUTPATIENT
Start: 2022-10-20 | End: 2022-10-20 | Stop reason: SDUPTHER

## 2022-10-20 RX ORDER — SODIUM CHLORIDE 9 MG/ML
INJECTION, SOLUTION INTRAVENOUS PRN
Status: DISCONTINUED | OUTPATIENT
Start: 2022-10-20 | End: 2022-10-20 | Stop reason: HOSPADM

## 2022-10-20 RX ORDER — OXYCODONE HYDROCHLORIDE 5 MG/1
5-10 TABLET ORAL EVERY 4 HOURS PRN
Qty: 60 TABLET | Refills: 0 | Status: SHIPPED | OUTPATIENT
Start: 2022-10-20 | End: 2022-10-25

## 2022-10-20 RX ORDER — ONDANSETRON 4 MG/1
4 TABLET, ORALLY DISINTEGRATING ORAL EVERY 8 HOURS PRN
Status: DISCONTINUED | OUTPATIENT
Start: 2022-10-20 | End: 2022-10-21 | Stop reason: HOSPADM

## 2022-10-20 RX ORDER — LISINOPRIL 5 MG/1
5 TABLET ORAL DAILY
Status: DISCONTINUED | OUTPATIENT
Start: 2022-10-21 | End: 2022-10-21 | Stop reason: HOSPADM

## 2022-10-20 RX ORDER — ESCITALOPRAM OXALATE 10 MG/1
10 TABLET ORAL DAILY
Status: DISCONTINUED | OUTPATIENT
Start: 2022-10-21 | End: 2022-10-21 | Stop reason: HOSPADM

## 2022-10-20 RX ORDER — KETOROLAC TROMETHAMINE 30 MG/ML
INJECTION, SOLUTION INTRAMUSCULAR; INTRAVENOUS PRN
Status: DISCONTINUED | OUTPATIENT
Start: 2022-10-20 | End: 2022-10-20 | Stop reason: HOSPADM

## 2022-10-20 RX ORDER — SODIUM CHLORIDE 9 MG/ML
INJECTION, SOLUTION INTRAVENOUS CONTINUOUS
Status: DISCONTINUED | OUTPATIENT
Start: 2022-10-20 | End: 2022-10-21 | Stop reason: HOSPADM

## 2022-10-20 RX ORDER — POLYETHYLENE GLYCOL 3350 17 G/17G
17 POWDER, FOR SOLUTION ORAL DAILY PRN
Status: DISCONTINUED | OUTPATIENT
Start: 2022-10-20 | End: 2022-10-21 | Stop reason: HOSPADM

## 2022-10-20 RX ORDER — ACETAMINOPHEN 325 MG/1
650 TABLET ORAL EVERY 6 HOURS
Status: DISCONTINUED | OUTPATIENT
Start: 2022-10-20 | End: 2022-10-21 | Stop reason: HOSPADM

## 2022-10-20 RX ORDER — SODIUM CHLORIDE, SODIUM LACTATE, POTASSIUM CHLORIDE, CALCIUM CHLORIDE 600; 310; 30; 20 MG/100ML; MG/100ML; MG/100ML; MG/100ML
INJECTION, SOLUTION INTRAVENOUS CONTINUOUS
Status: DISCONTINUED | OUTPATIENT
Start: 2022-10-20 | End: 2022-10-20 | Stop reason: HOSPADM

## 2022-10-20 RX ORDER — OXYCODONE HYDROCHLORIDE 5 MG/1
10 TABLET ORAL EVERY 4 HOURS PRN
Status: DISCONTINUED | OUTPATIENT
Start: 2022-10-20 | End: 2022-10-21 | Stop reason: HOSPADM

## 2022-10-20 RX ORDER — GLYCOPYRROLATE 0.2 MG/ML
INJECTION INTRAMUSCULAR; INTRAVENOUS PRN
Status: DISCONTINUED | OUTPATIENT
Start: 2022-10-20 | End: 2022-10-20 | Stop reason: SDUPTHER

## 2022-10-20 RX ORDER — LIDOCAINE HYDROCHLORIDE 10 MG/ML
1 INJECTION, SOLUTION INFILTRATION; PERINEURAL
Status: DISCONTINUED | OUTPATIENT
Start: 2022-10-20 | End: 2022-10-20 | Stop reason: HOSPADM

## 2022-10-20 RX ORDER — SENNA AND DOCUSATE SODIUM 50; 8.6 MG/1; MG/1
1 TABLET, FILM COATED ORAL 2 TIMES DAILY
Status: DISCONTINUED | OUTPATIENT
Start: 2022-10-20 | End: 2022-10-21 | Stop reason: HOSPADM

## 2022-10-20 RX ORDER — ONDANSETRON 2 MG/ML
4 INJECTION INTRAMUSCULAR; INTRAVENOUS EVERY 6 HOURS PRN
Status: DISCONTINUED | OUTPATIENT
Start: 2022-10-20 | End: 2022-10-21 | Stop reason: HOSPADM

## 2022-10-20 RX ORDER — SODIUM CHLORIDE 9 MG/ML
INJECTION, SOLUTION INTRAVENOUS PRN
Status: DISCONTINUED | OUTPATIENT
Start: 2022-10-20 | End: 2022-10-21 | Stop reason: HOSPADM

## 2022-10-20 RX ORDER — ONDANSETRON 4 MG/1
4 TABLET, ORALLY DISINTEGRATING ORAL EVERY 8 HOURS PRN
Qty: 20 TABLET | Refills: 0 | Status: SHIPPED | OUTPATIENT
Start: 2022-10-20

## 2022-10-20 RX ORDER — DEXAMETHASONE SODIUM PHOSPHATE 10 MG/ML
INJECTION INTRAMUSCULAR; INTRAVENOUS PRN
Status: DISCONTINUED | OUTPATIENT
Start: 2022-10-20 | End: 2022-10-20 | Stop reason: SDUPTHER

## 2022-10-20 RX ORDER — ACETAMINOPHEN 500 MG
1000 TABLET ORAL EVERY 6 HOURS PRN
COMMUNITY

## 2022-10-20 RX ORDER — ACETAMINOPHEN 500 MG
1000 TABLET ORAL ONCE
Status: COMPLETED | OUTPATIENT
Start: 2022-10-20 | End: 2022-10-20

## 2022-10-20 RX ORDER — ROPIVACAINE HYDROCHLORIDE 2 MG/ML
INJECTION, SOLUTION EPIDURAL; INFILTRATION; PERINEURAL PRN
Status: DISCONTINUED | OUTPATIENT
Start: 2022-10-20 | End: 2022-10-20 | Stop reason: HOSPADM

## 2022-10-20 RX ORDER — VANCOMYCIN HYDROCHLORIDE 1 G/20ML
INJECTION, POWDER, LYOPHILIZED, FOR SOLUTION INTRAVENOUS PRN
Status: DISCONTINUED | OUTPATIENT
Start: 2022-10-20 | End: 2022-10-20 | Stop reason: HOSPADM

## 2022-10-20 RX ORDER — SODIUM CHLORIDE 0.9 % (FLUSH) 0.9 %
5-40 SYRINGE (ML) INJECTION PRN
Status: DISCONTINUED | OUTPATIENT
Start: 2022-10-20 | End: 2022-10-21 | Stop reason: HOSPADM

## 2022-10-20 RX ORDER — TRANEXAMIC ACID 100 MG/ML
INJECTION, SOLUTION INTRAVENOUS PRN
Status: DISCONTINUED | OUTPATIENT
Start: 2022-10-20 | End: 2022-10-20 | Stop reason: SDUPTHER

## 2022-10-20 RX ORDER — HYDROMORPHONE HYDROCHLORIDE 1 MG/ML
0.5 INJECTION, SOLUTION INTRAMUSCULAR; INTRAVENOUS; SUBCUTANEOUS EVERY 5 MIN PRN
Status: DISCONTINUED | OUTPATIENT
Start: 2022-10-20 | End: 2022-10-20 | Stop reason: HOSPADM

## 2022-10-20 RX ADMIN — Medication 2000 MG: at 07:06

## 2022-10-20 RX ADMIN — SODIUM CHLORIDE, SODIUM LACTATE, POTASSIUM CHLORIDE, AND CALCIUM CHLORIDE 125 ML/HR: 600; 310; 30; 20 INJECTION, SOLUTION INTRAVENOUS at 06:19

## 2022-10-20 RX ADMIN — TRANEXAMIC ACID 1000 MG: 100 INJECTION, SOLUTION INTRAVENOUS at 07:10

## 2022-10-20 RX ADMIN — MIDAZOLAM 2 MG: 1 INJECTION, SOLUTION INTRAMUSCULAR; INTRAVENOUS at 06:47

## 2022-10-20 RX ADMIN — DEXAMETHASONE SODIUM PHOSPHATE 10 MG: 10 INJECTION INTRAMUSCULAR; INTRAVENOUS at 07:16

## 2022-10-20 RX ADMIN — ASPIRIN 81 MG: 81 TABLET, COATED ORAL at 20:36

## 2022-10-20 RX ADMIN — GLYCOPYRROLATE 0.2 MG: 0.2 INJECTION, SOLUTION INTRAMUSCULAR; INTRAVENOUS at 07:58

## 2022-10-20 RX ADMIN — ACETAMINOPHEN 650 MG: 325 TABLET, FILM COATED ORAL at 18:10

## 2022-10-20 RX ADMIN — MEPIVACAINE HYDROCHLORIDE 60 MG: 20 INJECTION, SOLUTION EPIDURAL; INFILTRATION at 07:03

## 2022-10-20 RX ADMIN — TRANEXAMIC ACID 1000 MG: 100 INJECTION, SOLUTION INTRAVENOUS at 08:28

## 2022-10-20 RX ADMIN — FENTANYL CITRATE 50 MCG: 50 INJECTION INTRAMUSCULAR; INTRAVENOUS at 06:47

## 2022-10-20 RX ADMIN — ONDANSETRON 4 MG: 2 INJECTION INTRAMUSCULAR; INTRAVENOUS at 07:15

## 2022-10-20 RX ADMIN — CEFAZOLIN 2000 MG: 10 INJECTION, POWDER, FOR SOLUTION INTRAVENOUS at 16:22

## 2022-10-20 RX ADMIN — Medication 3 AMPULE: at 06:20

## 2022-10-20 RX ADMIN — SODIUM CHLORIDE, SODIUM LACTATE, POTASSIUM CHLORIDE, AND CALCIUM CHLORIDE: 600; 310; 30; 20 INJECTION, SOLUTION INTRAVENOUS at 07:06

## 2022-10-20 RX ADMIN — ACETAMINOPHEN 1000 MG: 500 TABLET, FILM COATED ORAL at 06:20

## 2022-10-20 RX ADMIN — PROPOFOL 25 MCG/KG/MIN: 10 INJECTION, EMULSION INTRAVENOUS at 07:14

## 2022-10-20 RX ADMIN — ROPIVACAINE HYDROCHLORIDE 20 ML: 2 INJECTION, SOLUTION EPIDURAL; INFILTRATION at 06:46

## 2022-10-20 RX ADMIN — SODIUM CHLORIDE, PRESERVATIVE FREE 10 ML: 5 INJECTION INTRAVENOUS at 20:42

## 2022-10-20 ASSESSMENT — PAIN - FUNCTIONAL ASSESSMENT: PAIN_FUNCTIONAL_ASSESSMENT: 0-10

## 2022-10-20 ASSESSMENT — PAIN DESCRIPTION - DESCRIPTORS: DESCRIPTORS: STABBING

## 2022-10-20 NOTE — ANESTHESIA PROCEDURE NOTES
Peripheral Block    Patient location during procedure: pre-op  Reason for block: post-op pain management and at surgeon's request  Start time: 10/20/2022 6:46 AM  End time: 10/20/2022 6:48 AM  Staffing  Performed: anesthesiologist   Anesthesiologist: Jacqueline Stafford MD  Preanesthetic Checklist  Completed: patient identified, IV checked, site marked, risks and benefits discussed, surgical/procedural consents, equipment checked, pre-op evaluation, timeout performed, anesthesia consent given, oxygen available and monitors applied/VS acknowledged  Peripheral Block   Patient position: supine  Prep: ChloraPrep  Provider prep: mask  Patient monitoring: cardiac monitor, continuous pulse ox, frequent blood pressure checks, IV access and oxygen  Block type: Femoral  Adductor canal  Laterality: left  Injection technique: single-shot  Guidance: ultrasound guided  Local infiltration: ropivacaine  Local infiltration: ropivacaine    Needle   Needle type: insulated echogenic nerve stimulator needle   Needle gauge: 21 G  Needle localization: ultrasound guidance  Needle length: 10 cm  Assessment   Injection assessment: negative aspiration for heme, no paresthesia on injection, local visualized surrounding nerve on ultrasound and no intravascular symptoms  Paresthesia pain: none  Slow fractionated injection: yes  Hemodynamics: stable  Real-time US image taken/store: yes  Outcomes: patient tolerated procedure well and uncomplicated    Additional Notes  -Block placed for post op pain at surgeon's request.     -Ultrasound used to identify anatomy of nerve bundle. -Needle placement and local injection at perineural area confirmed with real time ultrasound guidance.     -Local visualized with ultrasound surrounding nerve. -Permanent Image taken and placed on chart.       Medications Administered  EPINEPHrine PF 1 MG/ML Soln, ropivacaine 0.2% Soln (Mixture components: EPINEPHrine PF 1 MG/ML Soln, 0.005 mL; ropivacaine 0.2% Soln, 0.1 mL) - Perineural   20 mL - 10/20/2022 6:46:00 AM  dexamethasone 4 MG/ML - Perineural   4 mg - 10/20/2022 6:46:00 AM

## 2022-10-20 NOTE — CARE COORDINATION
Patient is a 64y.o. year old male admitted for Left TKA . Patient plans to return home on discharge. Order received to arrange home health. Patient without preference towards agency. [facility]. Patient has a raised toilet seat. Patient requesting we arrange a walker. Referral sent to [address] delivered to the hospital room prior to discharge. Will follow until discharge. 10/20/22 7919   Service Assessment   Patient Orientation Alert and Oriented   Cognition Alert   History Provided By Patient   Primary Caregiver Self   Support Systems Spouse/Significant Other   PCP Verified by CM Yes   Last Visit to PCP Within last year   Prior Functional Level Independent in ADLs/IADLs   Current Functional Level Independent in ADLs/IADLs   Can patient return to prior living arrangement Yes   Ability to make needs known: Good   Family able to assist with home care needs: Yes   Would you like for me to discuss the discharge plan with any other family members/significant others, and if so, who? Yes   Social/Functional History   Lives With Spouse   Services At/After Discharge   Transition of Care Consult (CM Consult) [facility] Dis[facility] Health;PT   Condition of Participation: Discharge Planning   The Plan for Transition of Care is related to the following treatment goals: improve mobiltiy   The Patient and/or Patient Representative was provided with a Choice of Provider? Patient   The Patient and/Or Patient Representative agree with the Discharge Plan? Yes   Freedom of Choice list was provided with basic dialogue that supports the patient's individualized plan of care/goals, treatment preferences, and shares the quality data associated with the providers?   Yes

## 2022-10-20 NOTE — ANESTHESIA POSTPROCEDURE EVALUATION
Department of Anesthesiology  Postprocedure Note    Patient: Guillermo Gupta  MRN: [id number]  YOB: 1945  Date of evaluation: 10/20/2022      Procedure Summary     Date: 10/20/22 [place] [facility] [place] [facility] [place]    Anesthesia Start: 0656 Anesthesia Stop: 4265    Procedure: KNEE TOTAL ARTHROPLASTY ROBOTIC LEFT SDD (Left: Knee) Diagnosis:       Degenerative arthritis of left knee      (M17.12)    Surgeons: Gus Vallejo MD Responsible Provider: Mindy Tejeda MD    Anesthesia Type: Spinal ASA Status: 3          Anesthesia Type: Spinal    Gemini Phase I: Gemini Score: 7    Gemini Phase II:        Anesthesia Post Evaluation    Patient location during evaluation: PACU  Patient participation: complete - patient participated  Level of consciousness: awake  Airway patency: patent  Nausea & Vomiting: no nausea  Complications: no  Cardiovascular status: hemodynamically stable  Respiratory status: acceptable and nonlabored ventilation  Hydration status: stable  Multimodal analgesia pain management approach

## 2022-10-20 NOTE — H&P
The patient has end stage arthritis of the left knee joint. The patient was seen and examined and there are no changes to the patient's orthopedic condition. They have tried conservative treatment for this condition; including antiinflammatories and lifestyle modifications and have failed. The necessity for the joint replacement is still present, and the H&P from the office is still current. The patient will be admitted today forProcedure(s) (LRB):  KNEE TOTAL ARTHROPLASTY ROBOTIC LEFT SDD (Left) .

## 2022-10-20 NOTE — PROGRESS NOTES
10/20/22 1226   Treatment   Treatment Type IS   Oxygen Therapy/Pulse Ox   O2 Therapy Room air   Heart Rate 55   Resp 16   SpO2 96 %   $Pulse Oximeter $Spot check (single)   Incentive Spirometry Tx   Treatment Effort Assisted by RT   Achieved Volume (mL) 1000 mL   Patient achieved 1000 Ml/sec on IS. Patient encouraged to do every hour while awake-patient agreed and demonstrated. No shortness of breath or distress noted. BS are clear b/l.    [facility] notes reviewed- continuous sat # ordered HS

## 2022-10-20 NOTE — PROGRESS NOTES
ACUTE PHYSICAL THERAPY GOALS:   (Developed with and agreed upon by patient and/or caregiver.)  GOALS (1-4 days):  (1.)Mr. Jamel Pickering will move from supine to sit and sit to supine  in bed with SUPERVISION. (2.)Mr. Jamel Pickering will transfer from bed to chair and chair to bed with STAND BY ASSIST using the least restrictive device. (3.)Mr. Jamel Pickering will ambulate with STAND BY ASSIST for 250 feet with the least restrictive device. (4.)Mr. Jamel Pickering will ambulate up/down 3 steps with left railing with cane with CONTACT GUARD ASSIST.  (5.)Mr. Jamel Pickering will increase left knee ROM to 5-90°.  ________________________________________________________________________________________________      PHYSICAL THERAPY JOINT CAMP: TOTAL KNEE ARTHROPLASTY Initial Assessment and PM  (Link to Caseload Tracking: PT Visit Days : 1  Acknowledge Orders  Time In/Out  PT Charge Capture  Rehab Caseload Tracker  Episode   Nikky Scott is a 64 y.o. male   PRIMARY DIAGNOSIS: Degenerative arthritis of left knee  Degenerative arthritis of left knee [M17.12]  Procedure(s) (LRB):  KNEE TOTAL ARTHROPLASTY ROBOTIC LEFT SDD (Left)  Day of Surgery  Reason for Referral: Pain in Left Knee (M25.562)  Stiffness of Left Knee, Not elsewhere classified (M25.662)  Difficulty in walking, Not elsewhere classified (R26.2)  Outpatient Surgery: Payor: [place] / Plan: [facility] / Product Type: *No Product type* /     REHAB RECOMMENDATIONS:   Recommendation to date pending progress:  Setting:  [facility] 13:    3 in 1 Bedside Commode  Rolling Walker     RANGE OF MOTION:   Left Knee Flexion: L Knee Flexion 0-145: 90  Left Knee Extension: L Knee Extension 0: 5     GAIT: I Mod I S SBA CGA Min Mod Max Total  NT x2 Comments:   Level of Assistance [] [] [] [] [x] [] [] [] [] [] []            Weightbearing Status  Left Lower Extremity Weight Bearing: Weight Bearing As Tolerated    Distance  250 feet    Gait Quality Decreased judy , Decreased step clearance, Decreased step length, and Decreased stance    DME Rolling Walker     Stairs Stairs/Curb  Stairs?: Yes  Stairs  # Steps : 3  Rails: Left ascending  Device: Single pt cane  Assistance: Minimal assistance    Ramp     I=Independent, Mod I=Modified Independent, S=Supervision, SBA=Standby Assistance, CGA=Contact Guard Assistance,   Min=Minimal Assistance, Mod=Moderate Assistance, Max=Maximal Assistance, Total=Total Assistance, NT=Not Tested    ASSESSMENT:   ASSESSMENT:  Mr. Surekha Saavedra presents with decreased strength and range of motion left lower extremity and with decreased independence with functional mobility s/p left total knee arthroplasty. Pt will benefit from skilled PT interventions to maximize independence with functional mobility and TKA management. Pt did well with assessment but patient is impulsive. Wife and friend present and want him to spend the night and he agreed. Reviewed safety and importance of rom and swelling control and keeping knee straight. Today's treatment focused on transfer and gait training and steps. Pt practiced TKA exercises as below with verbal cues while reviewing HEP. Skipped bending exercises due to drainage from the knee on contact. RN notified. Reviewed use of cryocuff as needed for pain and swelling. Pt instructed not to get up without assist. Pt plans to discharge to home tomorrow from the hospital with continued therapy for follow up. No questions.   .     Outcome Measure:   [id number]       SUBJECTIVE:   Mr. Surekha Saavedra states, \"good\"     Home Environment/Prior Level of Function Lives With: Spouse  Home Layout: One level  Home Access: Stairs to enter with rails  Entrance Stairs - Number of Steps: 3  Bathroom Shower/Tub: Tub/Shower unit  Bathroom Equipment: Shower chair  Home Equipment: Sujatha Ramirez    OBJECTIVE:     PAIN: VITAL SIGNS: LINES/DRAINS:   Pre Treatment:         Post Treatment: mild knee pain Vitals        Oxygen None    RESTRICTIONS/PRECAUTIONS:  Restrictions/Precautions: Weight Bearing  Left Lower Extremity Weight Bearing: Weight Bearing As Tolerated        Restrictions/Precautions: Weight Bearing        LOWER EXTREMITY GROSS EVALUATION:  RIGHT LE   Within Functional Limits   Abnormal   Comments   Strength [x] []     Range of Motion [x] []        LEFT LE Within Functional Limits   Abnormal   Comments   Strength [] []  Can do slr   Range of Motion [] [] AROM LLE (degrees)  LLE AROM : Exceptions  L Knee Flexion 0-145: 90  L Knee Extension 0: 5     UPPER EXTREMITY GROSS EVALUATION:     Within Functional Limits   Abnormal   Comments   Strength [] []    Range of Motion [] []      SENSATION  Sensation [x]       COGNITION/  PERCEPTION: Intact Impaired (Comments):   Orientation [x]     Vision [x]     Hearing []  [place]   Cognition  [x]       MOBILITY: I Mod I S SBA CGA Min Mod Max Total  NT x2 Comments:   Bed Mobility    Rolling [] [] [] [] [] [] [] [] [] [] []    Supine to Sit [] [] [] [] [x] [] [] [] [] [] []    Scooting [] [] [] [] [x] [] [] [] [] [] []    Sit to Supine [] [] [] [] [] [] [] [] [] [] []    Transfers    Sit to Stand [] [] [] [] [x] [] [] [] [] [] []    Bed to Chair [] [] [] [] [x] [] [] [] [] [] []    Stand to Sit [] [] [] [] [x] [] [] [] [] [] []    Stand Pivot [] [] [] [] [] [] [] [] [] [] []    Toilet [] [] [] [] [] [] [] [] [] [] []     [] [] [] [] [] [] [] [] [] [] []    I=Independent, Mod I=Modified Independent, S=Supervision, SBA=Standby Assistance, CGA=Contact Guard Assistance,   Min=Minimal Assistance, Mod=Moderate Assistance, Max=Maximal Assistance, Total=Total Assistance, NT=Not Tested    BALANCE: Good Fair+ Fair Fair- Poor NT Comments   Sitting Static [x] [] [] [] [] []    Sitting Dynamic [x] [] [] [] [] []              Standing Static [] [x] [] [] [] [] With RW   Standing Dynamic [] [x] [] [] [] [] With RW     PLAN:   FREQUENCY AND DURATION: BID for duration of hospital stay or until stated goals are met, whichever comes first.    THERAPY PROGNOSIS: Good    PROBLEM LIST:   (Skilled intervention is medically necessary to address:)  Decreased ADL/Functional Activities, Decreased Activity Tolerance, Decreased AROM/PROM, Decreased Gait Ability, Decreased Strength, Decreased Transfer Abilities, and Increased Pain   INTERVENTIONS PLANNED:   (Benefits and precautions of physical therapy have been discussed with the patient.)  Therapeutic Activity, Therapeutic Exercise/HEP, Gait Training, Modalities, and Education       TREATMENT:   EVALUATION: LOW COMPLEXITY: (Untimed Charge)    TREATMENT:   Therapeutic Activity (30 Minutes): Therapeutic activity included Supine to Sit, Lateral Scooting, Transfer Training, Ambulation on level ground, Stair Training, Sitting balance , Standing balance, and exercises to improve functional Activity tolerance, Balance, Coordination, Mobility, Strength, and ROM.     TREATMENT GRID:  THERAPEUTIC  EXERCISES: DATE:  10/20 DATE:   DATE:      AM PM AM PM AM PM    [] [] [] [] [] []   Ankle Pumps  10       Quad Sets  10       Gluteal Sets  10       Hip Abd/ADduction  10       Straight Leg Raises  10       Knee Slides         Short Arc Quads         Chair Slides                           B = bilateral; AA = active assistive; A = active; P = passive      EDUCATION: Education Given To: Patient, Family  Education Provided: Role of Therapy, Home Exercise Program, Transfer Training  Education Method: Demonstration, Verbal  Education Outcome: Verbalized understanding  EDUCATION:  [x] Home Exercises  [x] Fall Precautions  [x] No Pillow Under Knee  [x] D/C Instruction Review [x] Cryocuff  [x] Walker Management/Safety  [] Adaptive Equipment as Needed     AFTER TREATMENT PRECAUTIONS: Bed/Chair Locked, Call light within reach, Chair, Heels floated, Needs within reach, RN notified, and Visitors at bedside    INTERDISCIPLINARY COLLABORATION:  RN/ PCT and OT/ GUO    COMPLIANCE WITH PROGRAM/EXERCISE: compliant most of the time, Will assess as treatment progresses. RECOMMENDATIONS/INTENT FOR NEXT TREATMENT SESSION: Treatment next visit will focus on increasing Mr. Collins's independence with bed mobility, transfers, gait training, strength/ROM exercises, modalities for pain, and patient education.      TIME IN/OUT:  Time In: 1140  Time Out: [place]  Minutes: 3 Dick Fontaine, PT

## 2022-10-20 NOTE — PROGRESS NOTES
OCCUPATIONAL THERAPY Initial Assessment and AM      (Link to Caseload Tracking: OT Visit Days: 1  OT Orders   Time  OT Charge Capture  Rehab Caseload Tracker  Episode     Eve Guerra is a 64 y.o. male   PRIMARY DIAGNOSIS: Degenerative arthritis of left knee  Degenerative arthritis of left knee [M17.12]  Procedure(s) (LRB):  KNEE TOTAL ARTHROPLASTY ROBOTIC LEFT SDD (Left)  Day of Surgery  Reason for Referral: Pain in Left Knee (M25.562)  Stiffness of Left Knee, Not elsewhere classified (L93.430)  Other lack of cordination (R27.8)  Difficulty in walking, Not elsewhere classified (R26.2)  Other abnormalities of gait and mobility (R26.89)  History of falling (Z91.81)  Outpatient Surgery: Payor: [place] / Plan: [place] / Product Type: *No Product type* /     ASSESSMENT:     REHAB RECOMMENDATIONS:   Recommendation to date pending progress:  Setting:  Home Health Therapy    Equipment:    Rolling Walker     ASSESSMENT:  Mr. Irby Aw is s/p left TKA and presents with decreased independence with functional mobility and activities of daily living as compared to baseline level of function and safety. Patient would benefit from skilled Occupational Therapy to maximize independence and safety with self-care task and functional mobility. Patient  supine in bed, his knee bandage has some blood at top of bandage. He transferred to EOB with SBA. He donned his clothes with min assit. He ambulated with CGA. He is impulsive and reported he has had many falls. Most recently this past weekend. He performed stairs and returend to his room. Recommend he spend the night and discharge home tomorrow. His two friends present and reported he has had many falls and are in agreement with POC. Will see in am for full ADL session. He has a tab alert in place. Nursing aware of patient status and therapy recommendations.        [employer] AM-PAC 6 Clicks Daily Activity Inpatient Short Form:    AM-PAC Daily Activity Inpatient   How much help for putting on and taking off regular lower body clothing?: A Little  How much help for Bathing?: A Little  How much help for Toileting?: A Little  How much help for putting on and taking off regular upper body clothing?: None  How much help for taking care of personal grooming?: None  How much help for eating meals?: None  AM-PAC Inpatient Daily Activity Raw Score: 21  AM-PAC Inpatient ADL T-Scale Score : 44.27  ADL Inpatient CMS 0-100% Score: 32.79  ADL Inpatient CMS G-Code Modifier : CJ     SUBJECTIVE:     Mr. Leonila Barr stated he falls alot      Social/Functional   Lives With: Spouse  Home Layout: One level  Home Access: Stairs to enter with rails  Entrance Stairs - Number of Steps: 3  Bathroom Shower/Tub: Tub/Shower unit  Bathroom Equipment: Shower chair  Home Equipment: Cane, Rollator    OBJECTIVE:     Margo Glez / Roderick Butter / Cayla Close: NA    RESTRICTIONS/PRECAUTIONS:  Restrictions/Precautions: Weight Bearing  Left Lower Extremity Weight Bearing: Weight Bearing As Tolerated    PAIN: VITALS / O2:   Pre Treatment:        0/10  Post Treatment: 0/10 Vitals          Oxygen        GROSS EVALUATION: INTACT IMPAIRED   (See Comments)   UE AROM [x] []   UE PROM [] []   Strength [x]       Posture / Balance []  CGA in standing   Sensation []     Coordination []  CGA in standing     Tone []       Edema []    Activity Tolerance []    Fair with mobility   Hand Dominance R [] L []      COGNITION/  PERCEPTION: INTACT IMPAIRED   (See Comments)   Orientation [x]     Vision [x]     Hearing [x]     Cognition  [x]     Perception [] Cues for safety      MOBILITY: I Mod I S SBA CGA Min Mod Max Total  NT x2 Comments:   Bed Mobility    Rolling [] [] [] [] [] [] [] [] [] [] []    Supine to Sit [] [] [] [x] [] [] [] [] [] [] []    Scooting [] [] [] [x] [] [] [] [] [] [] []    Sit to Supine [] [] [] [] [] [] [] [] [] [] []    Transfers    Sit to Stand [] [] [] [] [x] [] [] [] [] [] []    Bed to Chair [] [] [] [] [x] [] [] [] [] [] []    Stand to Sit [] [] [] [] [x] [] [] [] [] [] []    Tub/Shower [] [] [] [] [] [] [] [] [] [] []       Toilet [] [] [] [] [] [] [] [] [] [] []        [] [] [] [] [] [] [] [] [] [] []    I=Independent, Mod I=Modified Independent, S=Supervision/Setup, SBA=Standby Assistance, CGA=Contact Guard Assistance, Min=Minimal Assistance, Mod=Moderate Assistance, Max=Maximal Assistance, Total=Total Assistance, NT=Not Tested    ACTIVITIES OF DAILY LIVING: I Mod I S SBA CGA Min Mod Max Total NT Comments   BASIC ADLs:              Upper Body Bathing [] [] [] [] [] [] [] [] [] []    Lower Body Bathing [] [] [] [] [] [] [] [] [] []    Toileting [] [] [] [] [] [] [] [] [] []    Upper Body Dressing [] [] [x] [] [] [] [] [] [] []    Lower Body Dressing [] [] [] [] [] [x] [] [] [] []    Feeding [] [] [x] [] [] [] [] [] [] []    Grooming [] [] [] [] [] [] [] [] [] []    Personal Device Care [] [] [] [] [] [] [] [] [] []    Functional Mobility [] [] [] [] [x] [] [] [] [] []    I=Independent, Mod I=Modified Independent, S=Supervision/Setup, SBA=Standby Assistance, CGA=Contact Guard Assistance, Min=Minimal Assistance, Mod=Moderate Assistance, Max=Maximal Assistance, Total=Total Assistance, NT=Not Tested    PLAN:     FREQUENCY/DURATION   OT Plan of Care: 1 time/week, 2 times/week for duration of hospital stay or until stated goals are met, whichever comes first.    ACUTE OCCUPATIONAL THERAPY GOALS:   (Developed with and agreed upon by patient and/or caregiver.)    GOALS:   DISCHARGE GOALS (in preparation for going home/rehab):  3 days  1. Mr. James Roy will perform lower body dressing activity with minimal assist required to demonstrate improved functional mobility and safety. -GOAL MET 10/20/22     2. Mr. James Roy will perform bathing activity with minimal assist required to demonstrate improved functional mobility and safety.   3.  Mr. James Roy will perform toileting activity with  contact guard assist to demonstrate improved functional mobility and safety. 4.  Mr. Sindy Weems will perform all functional transfers transfer with contact guard assist to demonstrate improved functional mobility and safety. PROBLEM LIST:   (Skilled intervention is medically necessary to address:)  Decreased ADL/Functional Activities  Decreased Balance  Decreased Coordination  Decreased Gait Ability  Decreased Safety Awareness  Decreased Strength  Decreased Transfer Abilities  Increased Pain   INTERVENTIONS PLANNED:   (Benefits and precautions of occupational therapy have been discussed with the patient.)  Self Care Training  Therapeutic Activity  Therapeutic Exercise/HEP  Neuromuscular Re-education  Education         TREATMENT:     EVALUATION: LOW COMPLEXITY: (Untimed Charge)    TREATMENT:   SELF CARE: (30 minutes):   Procedure(s) (per grid) utilized to improve and/or restore self-care/home management as related to dressing and functional mobility . Required minimal visual, verbal, manual, and tactile cueing to facilitate activities of daily living skills, compensatory activities, and OT poc and safety with mobility .          AFTER TREATMENT PRECAUTIONS: Alarm Activated, Bed/Chair Locked, Call light within reach, Chair, Needs within reach, RN notified, and Visitors at bedside    INTERDISCIPLINARY COLLABORATION:  RN/ PCT, PT/ PTA, OT/ GUO, and RN Case Manager/      EDUCATION:  Education Given To: Patient, Family  Education Provided: Role of Therapy, Plan of Care, Precautions, ADL Adaptive Strategies, Transfer Training, IADL Safety, Family Education, Fall Prevention Strategies, Equipment  Education Method: Verbal, Demonstration  Barriers to Learning: Other (Comment) (poor safety awareness)  Education Outcome: Verbalized understanding, Continued education needed  [x] Safe And Effective Hygiene  [x] Fall Precautions  [] Hip Precautions  [] D/C Instruction Review [] Prosthesis Review  [x] Walker Management/Safety  [x] Adaptive Equipment as Needed  [x] Therapeutic Resting Position of Joint       TOTAL TREATMENT DURATION AND TIME:  Time In: 1135  Time Out: [address]25 [address]  Minutes: Jermaine Ricardo OT

## 2022-10-20 NOTE — PERIOP NOTE
Betadine lavage:  17.5cc of betadine lot #[id number], exp. 2024-10-31,  in 500cc of . 9NS Lot #[id number], exp. AUG25:  LEFT KNEE

## 2022-10-20 NOTE — ANESTHESIA PRE PROCEDURE
Department of Anesthesiology  Preprocedure Note       Name:  Catrachito Milian   Age:  64 y.o.  :  1945                                          MRN:  [id number]         Date:  10/20/2022      Surgeon: Mckay Campoverde):  Humberto Stroud MD    Procedure: Procedure(s):  KNEE TOTAL ARTHROPLASTY ROBOTIC LEFT SDD    Medications prior to admission:   Prior to Admission medications    Medication Sig Start Date End Date Taking? Authorizing Provider   acetaminophen (TYLENOL) 500 MG tablet Take 1,000 mg by mouth every 6 hours as needed for Pain   Yes Historical Provider, MD   B Complex-C-Folic Acid (STRESS B COMPLEX PO) Take by mouth    Historical Provider, MD   KRILL OIL OMEGA-3 PO Take 1 capsule by mouth    Historical Provider, MD   Multiple Vitamin (MULTIVITAMIN ADULT PO) Take 1 tablet by mouth daily    Historical Provider, MD   CPAP Machine MISC Use as Instructed. CPAP [id number] 22   Historical Provider, MD   calcium carbonate (OSCAL) 500 MG TABS tablet Take 1,500 mg by mouth in the morning and 1,500 mg before bedtime. Historical Provider, MD   pravastatin (PRAVACHOL) 80 MG tablet Take 1 tablet by mouth in the morning.   Patient taking differently: Take 80 mg by mouth at bedtime 22   Brii Barbosa DO   aspirin 81 MG EC tablet Take 81 mg by mouth    Ar Automatic Reconciliation   vitamin D 25 MCG (1000 UT) CAPS Take 1,000 Units by mouth daily    Ar Automatic Reconciliation   escitalopram (LEXAPRO) 10 MG tablet Take 10 mg by mouth daily 22   Ar Automatic Reconciliation   fluticasone (FLONASE) 50 MCG/ACT nasal spray 2 sprays by Nasal route at bedtime 21   Ar Automatic Reconciliation   lisinopril (PRINIVIL;ZESTRIL) 5 MG tablet Take 5 mg by mouth daily    Ar Automatic Reconciliation       Current medications:    Current Facility-Administered Medications   Medication Dose Route Frequency Provider Last Rate Last Admin    lidocaine 1 % injection 1 mL  1 mL IntraDERmal Once PRN MD Miles Garrison acetaminophen (TYLENOL) tablet 1,000 mg  1,000 mg Oral Once Keiko Hair MD        fentaNYL (SUBLIMAZE) injection 100 mcg  100 mcg IntraVENous Once PRN Keiko Hair MD        lactated ringers infusion   IntraVENous Continuous Keiko Hair MD        sodium chloride flush 0.9 % injection 5-40 mL  5-40 mL IntraVENous 2 times per day Keiko Hair MD        sodium chloride flush 0.9 % injection 5-40 mL  5-40 mL IntraVENous PRN Keiko Hair MD        0.9 % sodium chloride infusion   IntraVENous PRN Keiko Hair MD        midazolam PF (VERSED) injection 2 mg  2 mg IntraVENous Once PRN Keiko Hari MD        sodium chloride flush 0.9 % injection 5-40 mL  5-40 mL IntraVENous 2 times per day PELON Rowland        sodium chloride flush 0.9 % injection 5-40 mL  5-40 mL IntraVENous PRN Donaldo Ibrahim PA        0.9 % sodium chloride infusion   IntraVENous PRN Donaldo Ibrahim PA        ceFAZolin (ANCEF) 2000 mg in sterile water 20 mL IV syringe  2,000 mg IntraVENous On Call to [address] [place] [address]        alcohol 62% (NOZIN) nasal  3 ampule  3 ampule Topical Once Nickolas Rhodes MD           Allergies:     Allergies   Allergen Reactions    Penicillins Other (See Comments) and Rash     Fever, joint pain, dizzy         Problem List:    Patient Active Problem List   Diagnosis Code    Cellulitis and abscess of leg, except foot L03.119, L02.419    Ventral hernia K43.9    Scabies B86    Lower GI bleed K92.2    Mixed hyperlipidemia E78.2    History of prostate cancer Z85.46    Actinic keratosis L57.0    Groin mass R19.09    Essential hypertension, benign I10    Hypercalcemia E83.52    Degenerative arthritis of left knee M17.12       Past Medical History:        Diagnosis Date    Actinic keratosis     Actinic keratosis     Arthritis     CAD (coronary artery disease)     CABG ,bovine valve    Cancer (HCC)     Cellulitis and abscess of leg, except foot     Disorders of magnesium metabolism     Essential hypertension, benign     Heart failure ([facility] [address]     MI     High cholesterol     History of blood transfusion     History of prostate cancer     Hyperparathyroidism, unspecified ([facility] [address]     managed with medication     Mixed hyperlipidemia     Neoplasm of uncertain behavior of skin     JUSTINA (obstructive sleep apnea)     uses CPAP    S/P AVR (aortic valve replacement)     Scabies     Wears hearing aid        Past Surgical History:        Procedure Laterality Date    COLONOSCOPY N/A 3/8/2019    COLONOSCOPY/ 25/ ROOM 720 performed by Lucrecia [facility]rbina [facility]      x4, vein harvest    HEENT      partial thyroid     HERNIA REPAIR  2014    HX AAA OPEN REPAIR  2019    KNEE ARTHROSCOPY Left     ORTHOPEDIC SURGERY      OR CARDIAC SURG PROCEDURE UNLIST     [id number]       Social History:    Social History     Tobacco Use    Smoking status: Never    Smokeless tobacco: Former    Tobacco comments:     Quit smokin pack per 3 weeks   Substance Use Topics    Alcohol use: Not Currently                                Counseling given: Not Answered  Tobacco comments: Quit smokin pack per 3 weeks      Vital Signs (Current):   Vitals:    10/20/22 0539   BP: (!) 148/76   Pulse: (!) 44   Resp: 18   Temp: 97.9 °F (36.6 °C)   TempSrc: Temporal   SpO2: 94%   Weight: 188 lb 8 oz (85.5 kg)   Height: 5' 7\" (1.702 m)                                              BP Readings from Last 3 Encounters:   10/20/22 (!) 148/76   10/04/22 111/73   22 130/78       NPO Status: Time of last liquid consumption:                         Time of last solid consumption:                         Date of last liquid consumption: 10/19/22                        Date of last solid food consumption: 10/19/22    BMI:   Wt Readings from Last 3 Encounters:   10/20/22 188 lb 8 oz (85.5 kg)   10/04/22 185 lb 4.8 oz (84.1 kg) 07/26/22 185 lb (83.9 kg)     Body mass index is 29.52 kg/m². CBC:   Lab Results   Component Value Date/Time    WBC 6.0 10/04/2022 12:52 PM    RBC 4.84 10/04/2022 12:52 PM    HGB 14.6 10/04/2022 12:52 PM    HCT 44.6 10/04/2022 12:52 PM    MCV 92.1 10/04/2022 12:52 PM    RDW 13.0 10/04/2022 12:52 PM     10/04/2022 12:52 PM       CMP:   Lab Results   Component Value Date/Time     10/04/2022 12:52 PM    K 3.9 10/04/2022 12:52 PM     10/04/2022 12:52 PM    CO2 28 10/04/2022 12:52 PM    BUN 15 10/04/2022 12:52 PM    CREATININE 0.91 10/04/2022 12:52 PM    GFRAA >60 07/19/2022 08:32 AM    AGRATIO 1.3 01/20/2022 03:32 PM    LABGLOM >60 10/04/2022 12:52 PM    GLUCOSE 104 10/04/2022 12:52 PM    PROT 7.2 07/19/2022 08:32 AM    CALCIUM 8.9 10/04/2022 12:52 PM    BILITOT 0.5 07/19/2022 08:32 AM    ALKPHOS 56 07/19/2022 08:32 AM    ALKPHOS 68 01/20/2022 03:32 PM    AST 21 07/19/2022 08:32 AM    ALT 28 07/19/2022 08:32 AM       POC Tests: No results for input(s): POCGLU, POCNA, POCK, POCCL, POCBUN, POCHEMO, POCHCT in the last 72 hours.     Coags:   Lab Results   Component Value Date/Time    PROTIME 13.3 10/04/2022 12:52 PM    INR 1.0 10/04/2022 12:52 PM    APTT 38.5 10/04/2022 12:52 PM       HCG (If Applicable): No results found for: PREGTESTUR, PREGSERUM, HCG, HCGQUANT     ABGs: No results found for: PHART, PO2ART, QAW9ZEN, FYC1LYO, BEART, M5FMMAKD     Type & Screen (If Applicable):  No results found for: LABABO, LABRH    Drug/Infectious Status (If Applicable):  No results found for: HIV, HEPCAB    COVID-19 Screening (If Applicable): No results found for: COVID19        Anesthesia Evaluation  Patient summary reviewed and Nursing notes reviewed no history of anesthetic complications:   Airway: Mallampati: III  TM distance: <3 FB        Dental:    (+) upper dentures and lower dentures      Pulmonary: breath sounds clear to auscultation  (+) sleep apnea:                             Cardiovascular:  Exercise tolerance: good (>4 METS),   (+) hypertension:, valvular problems/murmurs:, CAD:, hyperlipidemia        Rhythm: regular  Rate: abnormal  Echocardiogram reviewed               ROS comment: TTE 2022- · The left ventricular systolic function is decreased (40 - 45%). · Left ventricular global hypokinesis. · Grade I (mild) left ventricular diastolic dysfunction present,   consistent with impaired relaxation. · The right ventricular systolic function is normal.   · The left atrium is mildly dilated. · A bioprosthetic aortic stent valve is in place. No prosthetic stenosis. Mild prosthetic valve regurgitation, which appears valvular, not   perivalvular. · Mild tricuspid valve regurgitation. · The ascending aorta is mildly dilated. (44 mm)      PE comment: alysa   Neuro/Psych:   (+) depression/anxiety             GI/Hepatic/Renal: Neg GI/Hepatic/Renal ROS            Endo/Other:    (+) : arthritis: OA., .                 Abdominal:             Vascular: negative vascular ROS. Other Findings:           Anesthesia Plan      spinal     ASA 3     (Adductor canal for post op pain)  Induction: intravenous. Anesthetic plan and risks discussed with patient and spouse.               Post-op pain plan if not by surgeon: single peripheral nerve block            Suly Hodgson MD   10/20/2022

## 2022-10-20 NOTE — PERIOP NOTE
TRANSFER - OUT REPORT:    Verbal report given to Hayde Vallejo RN on Levorn Stack  being transferred to [phone] for routine progression of patient care       Report consisted of patient's Situation, Background, Assessment and   Recommendations(SBAR). Information from the following report(s) Nurse Handoff Report and Cardiac Rhythm SB  was reviewed with the receiving nurse. Lines:   Peripheral IV 10/20/22 Left; Anterior Forearm (Active)   Site Assessment Clean;Dry; Intact 10/20/22 0915   Line Status Infusing 10/20/22 0915   Phlebitis Assessment No symptoms 10/20/22 0915   Infiltration Assessment 0 10/20/22 0915   Dressing Status Clean, dry & intact 10/20/22 0915   Dressing Type Transparent 10/20/22 0915        Opportunity for questions and clarification was provided.       Patient transported with:  O2 @ 2lpm

## 2022-10-20 NOTE — H&P
The patient has end stage arthritis of the left. The patient was see and examined and there are no changes to the patient's orthopedic condition. They have tried conservative treatment for this condition; including antiinflammatories and lifestyle modifications and have failed. The necessity for the joint replacement is still present, and the H&P from the office is still current. The patient is admitted today forProcedure(s) (LRB):  KNEE TOTAL ARTHROPLASTY ROBOTIC LEFT SDD (Left) .

## 2022-10-20 NOTE — ANESTHESIA PROCEDURE NOTES
Spinal Block    Patient location during procedure: OR  End time: 10/20/2022 7:08 AM  Reason for block: primary anesthetic  Staffing  Performed: anesthesiologist   Anesthesiologist: Armida Arteaga MD  Spinal Block  Patient position: sitting  Prep: ChloraPrep  Patient monitoring: cardiac monitor, continuous pulse ox and frequent blood pressure checks  Approach: midline  Location: L3/L4  Provider prep: sterile gloves and mask  Local infiltration: lidocaine  Needle  Needle type: pencil-tip   Needle gauge: 25 G  Needle length: 3.5 in  Assessment  Events: cerebrospinal fluid  Swirl obtained: Yes  CSF: clear  Attempts: 1  Preanesthetic Checklist  Completed: patient identified, IV checked, risks and benefits discussed, surgical/procedural consents, equipment checked, pre-op evaluation, timeout performed, anesthesia consent given, oxygen available and monitors applied/VS acknowledged

## 2022-10-20 NOTE — OP NOTE
[address] [place] [address] Robotic Assisted Total Knee Arthroplasty: Posterior Cruciate Substituting      Patient:Boris Avila   : 1945  Medical Record Number:[id number]      Pre-operative Diagnosis:  left Knee Arthritis M17.12  Post-operative Diagnosis:  Same  Location: [facility]    Date of Procedure: 10/20/2022  Surgeon: Yael Amaya MD  Assistant:  PELON Gomez    Anesthesia: Spinal and  nerve block  BMI:Body mass index is 29.52 kg/m². CPT- 49641- Total knee arthroplasty           33871- Other procedures on musculoskeletal system            0055T- Computer assisted surgical navigation     Procedure:  Left Cemented Posterior Stabilized Total Knee Arthroplasty     Tourniquet Time: none    EBL:  250 cc         The complexity of the total joint surgery requires the use of a first assistant for positioning, retraction and assistance in closure. PELON Medina was this assistant. Bernabe Duncan was brought to the operating room and positioned on the operating table. He was anesthestized with anesthesia. IV antibiotics was administered. Prior to the incision being made a timeout was called identifying the patient, procedure ,operative side and surgeon The operative leg was prepped and draped in the usual sterile manner. An anterior longitudinal incision was accomplished just medial to the tibial tubercle and extending approximal 6 centimeters proximal to the superior pole of the patella over the left knee. A medial parapatellar capsular incision was performed. The medial capsular flap was elevated around to the insertion of the semimembranous tendon. The patella was everted and the knee flexed and externally rotated. The medial and lateral menisci were excised. The lateral half of the fat pad excised and the patella femoral ligament was released. The anterior cruciate ligament was resect and the posterior cruciate ligament was retained.       The femoral and tibial arrays were pinned in place and registered with the [employer]. The patient landmarks were collected and the tibial and femoral checkpoints were registered and verified. The preresection balancing was performed. The osteophytes were then removed as exposed on the femoral and tibial surfaces. Utilizing the [facility] robotic arm the femoral and tibial cuts were accomplished. A notch cut was accomplished. The tibia was sized. The tibial base plate was pinned into place with the appropriate external rotation and stem site prepared. A trial femoral component and poly was placed. A preliminary range of motion was accomplished with the trial components. The patient was found to obtain full extension as well as appropriate flexion. The patient's ligaments were stable in flexion and extension to medial and lateral stressing and the alignment was through the appropriate mechanical axis. Additional surgical releases were none. The patella was then everted. Osteophytes were removed. The patella articular surface was inspected and resurfaced. All trial components were removed. The real implants were opened: Sizes selected were a size 6 femoral, 6 tibial, and a 16 TS mm polyethylene insert. A size 38 cemented patella button was selected. The knee was irrigated. 1 pack of Palacos G was mixed and the real components were cemented into place. Rach Suazo knee was placed through range of motion and noted to be stable as mentioned above with the trial components. The operative knee was injected with 60 cc of Naropin, 10 cc's of morphine and 1 cc of 30 mg of Toradol. The knee was then soaked with a betadine solution for approximately 3 min. This was then thoroughly irrigated. The capsular layer was closed using a #1 PDS suture and a #1 Vicryl. Then, 1 gram (100 mg/ml) of Transexamic Acid and 1 gram of Vancomycin was injected into the joint space.  The subcutaneous layers were closed using 2-0 vicryl. The skin was closed using staples which were applied in occlusive fashion and sterile bandage applied. An Iceman cryo pad was applied on the operative leg. Sponge count and needle counts were correct. Nikky Scott left the operating room     Implants:   Implant Name Type Inv.  Item Serial No.  Lot No. LRB No. Used Action   CEMENT BONE 40 GM W/ GENTMYCN HI VISC PALACOS R+G - [id number]  CEMENT BONE 40 GM W/ GENTMYCN HI VISC PALACOS R+G  [facility] [id number] Left 1 Implanted   COMPONENT FEM SZ 6 L KNEE POST STBL NANCY TRIATHLON - [id number]  COMPONENT FEM SZ 6 L KNEE POST STBL NANCY TRIATHLON  [employer] [facility] [id number] Left 1 Implanted   INSERT TIB TOT STBL + 6 16 MM KNEE 5/PK X3 TRIATHLON - [id number]  INSERT TIB TOT STBL + 6 16 MM KNEE 5/PK X3 TRIATHLON  [employer] [facility] [id number] Left 1 Implanted   BASEPLATE TIB SZ 6 UNIV KNEE TRITANIUM TOT STBL NANCY - [id number]  BASEPLATE TIB SZ 6 UNIV KNEE TRITANIUM TOT STBL NANCY  [employer] [facility] [id number] Left 1 Implanted   TRIATHLON ASYMMETRIC PATELLA, 38MM MARJ, 11MM THKNS     [id number] Left 1 Implanted         Signed By: Shelly Barron MD   10/20/2022,  8:40 AM

## 2022-10-20 NOTE — PROGRESS NOTES
TRANSFER - IN REPORT:    Verbal report received from anjel Nur on [address]  being received from PACU for routine post-op      Report consisted of patient's Situation, Background, Assessment and   Recommendations(SBAR). Information from the following report(s) Nurse Handoff Report was reviewed with the receiving nurse. Opportunity for questions and clarification was provided. Assessment completed upon patient's arrival to unit and care assumed.

## 2022-10-21 ENCOUNTER — HOME CARE VISIT (OUTPATIENT)
Dept: SCHEDULING | Facility: HOME HEALTH | Age: 77
End: 2022-10-21

## 2022-10-21 VITALS
HEIGHT: 67 IN | DIASTOLIC BLOOD PRESSURE: 90 MMHG | OXYGEN SATURATION: 92 % | TEMPERATURE: 98.4 F | WEIGHT: 188.5 LBS | HEART RATE: 74 BPM | SYSTOLIC BLOOD PRESSURE: 148 MMHG | RESPIRATION RATE: 18 BRPM | BODY MASS INDEX: 29.58 KG/M2

## 2022-10-21 VITALS
RESPIRATION RATE: 16 BRPM | TEMPERATURE: 99.3 F | SYSTOLIC BLOOD PRESSURE: 128 MMHG | DIASTOLIC BLOOD PRESSURE: 76 MMHG | HEART RATE: 62 BPM | OXYGEN SATURATION: 92 %

## 2022-10-21 LAB
HCT VFR BLD AUTO: 36.8 % (ref 41.1–50.3)
HGB BLD-MCNC: 11.8 G/DL (ref 13.6–17.2)

## 2022-10-21 PROCEDURE — 97530 THERAPEUTIC ACTIVITIES: CPT

## 2022-10-21 PROCEDURE — 36415 COLL VENOUS BLD VENIPUNCTURE: CPT

## 2022-10-21 PROCEDURE — 1090000001 HH PPS REVENUE CREDIT

## 2022-10-21 PROCEDURE — 85014 HEMATOCRIT: CPT

## 2022-10-21 PROCEDURE — 0221000100 HH NO PAY CLAIM PROCEDURE

## 2022-10-21 PROCEDURE — 6360000002 HC RX W HCPCS: Performed by: PHYSICIAN ASSISTANT

## 2022-10-21 PROCEDURE — 6370000000 HC RX 637 (ALT 250 FOR IP): Performed by: PHYSICIAN ASSISTANT

## 2022-10-21 PROCEDURE — G0151 HHCP-SERV OF PT,EA 15 MIN: HCPCS

## 2022-10-21 PROCEDURE — 400013 HH SOC

## 2022-10-21 PROCEDURE — 97535 SELF CARE MNGMENT TRAINING: CPT

## 2022-10-21 PROCEDURE — 2500000003 HC RX 250 WO HCPCS: Performed by: PHYSICIAN ASSISTANT

## 2022-10-21 PROCEDURE — 1090000002 HH PPS REVENUE DEBIT

## 2022-10-21 RX ADMIN — ACETAMINOPHEN 650 MG: 325 TABLET, FILM COATED ORAL at 00:03

## 2022-10-21 RX ADMIN — ASPIRIN 81 MG: 81 TABLET, COATED ORAL at 10:14

## 2022-10-21 RX ADMIN — SENNOSIDES AND DOCUSATE SODIUM 1 TABLET: 50; 8.6 TABLET ORAL at 10:15

## 2022-10-21 RX ADMIN — ESCITALOPRAM OXALATE 10 MG: 10 TABLET ORAL at 10:14

## 2022-10-21 RX ADMIN — ACETAMINOPHEN 650 MG: 325 TABLET, FILM COATED ORAL at 05:14

## 2022-10-21 RX ADMIN — CEFAZOLIN 2000 MG: 10 INJECTION, POWDER, FOR SOLUTION INTRAVENOUS at 00:03

## 2022-10-21 RX ADMIN — LISINOPRIL 5 MG: 5 TABLET ORAL at 10:15

## 2022-10-21 ASSESSMENT — ENCOUNTER SYMPTOMS
PAIN LOCATION - PAIN QUALITY: ACHES, SORE
DYSPNEA ACTIVITY LEVEL: AFTER AMBULATING 10 - 20 FT

## 2022-10-21 NOTE — PROGRESS NOTES
ACUTE PHYSICAL THERAPY GOALS:   (Developed with and agreed upon by patient and/or caregiver.)  GOALS (1-4 days):  (1.)Mr. Fernando Murphy will move from supine to sit and sit to supine  in bed with SUPERVISION. (2.)Mr. Fernando Murphy will transfer from bed to chair and chair to bed with STAND BY ASSIST using the least restrictive device. (3.)Mr. Fernando Murphy will ambulate with STAND BY ASSIST for 250 feet with the least restrictive device. (4.)Mr. Fernando Murphy will ambulate up/down 3 steps with left railing with cane with CONTACT GUARD ASSIST.  (5.)Mr. Fernando Murphy will increase left knee ROM to 5-90°.   Goals met  ________________________________________________________________________________________________      PHYSICAL THERAPY JOINT CAMP: TOTAL KNEE ARTHROPLASTY Daily Note and AM  (Link to Caseload Tracking: PT Visit Days : 1  Acknowledge Orders  Time In/Out  PT Charge Capture  Rehab Caseload Tracker  Episode   Guillermo Gupta is a 64 y.o. male   PRIMARY DIAGNOSIS: Degenerative arthritis of left knee  Degenerative arthritis of left knee [M17.12]  Procedure(s) (LRB):  KNEE TOTAL ARTHROPLASTY ROBOTIC LEFT SDD (Left)  1 Day Post-Op  Reason for Referral: Pain in Left Knee (M25.562)  Stiffness of Left Knee, Not elsewhere classified (M25.662)  Difficulty in walking, Not elsewhere classified (R26.2)  Outpatient Surgery: Payor: Amaryllis Kehr / Plan: Amaryllis Kehr / Product Type: *No Product type* /     REHAB RECOMMENDATIONS:   Recommendation to date pending progress:  Setting:  Home Health Therapy    Equipment:    3 in 1 Bedside Commode  Rolling Walker     RANGE OF MOTION:   Left Knee Flexion: L Knee Flexion 0-145: did not measure but knee bent more than 90 degrees on contact sitting on edge of bed  Left Knee Extension: L Knee Extension 0: 7     GAIT: I Mod I S SBA CGA Min Mod Max Total  NT x2 Comments:   Level of Assistance [] [] [] [x] [] [] [] [] [] [] []            Weightbearing Status  Left Lower Extremity Weight Bearing: Weight Bearing As Tolerated    Distance  250 feet    Gait Quality Decreased judy , Decreased step clearance, Decreased step length, and Decreased stance    DME Rolling Walker     Stairs Stairs/Curb  Stairs?: Yes  Stairs  # Steps : 3  Rails: Left ascending  Device: Single pt cane  Assistance: Contact guard assistance    Ramp     I=Independent, Mod I=Modified Independent, S=Supervision, SBA=Standby Assistance, CGA=Contact Guard Assistance,   Min=Minimal Assistance, Mod=Moderate Assistance, Max=Maximal Assistance, Total=Total Assistance, NT=Not Tested    ASSESSMENT:   ASSESSMENT:  Mr. Katelyn Peraza presents with decreased strength and range of motion left lower extremity and with decreased independence with functional mobility s/p left total knee arthroplasty. Pt will benefit from skilled PT interventions to maximize independence with functional mobility and TKA management. Pt did well with assessment but patient is impulsive. Wife and friend present and want him to spend the night and he agreed. Reviewed safety and importance of rom and swelling control and keeping knee straight. Today's treatment focused on transfer and gait training and steps. Pt practiced TKA exercises as below with verbal cues while reviewing HEP. Skipped bending exercises due to drainage from the knee on contact. RN notified. Reviewed use of cryocuff as needed for pain and swelling. Pt instructed not to get up without assist. Pt plans to discharge to home tomorrow from the hospital with continued therapy for follow up. No questions. 10/21/22 - pt. On side of bed and going well. Spoke with MD who said hold ROM until home health due to drainage yesterday. No drainage noted this am but no flexion exercises performed this am and told him to hold rom until HHPT. He ambulated well with rW and did some steps again without difficulty. He did some exercises listed below well.   Reviewed safety and swelling control and that he will have more pain at home. He had no questions or concerns.   .     Outcome Measure:   [id number]:[id number]       SUBJECTIVE:   Mr. Anjel Alexis states, \"well\"     Home Environment/Prior Level of Function Lives With: Spouse  Home Layout: One level  Home Access: Stairs to enter with rails  Entrance Stairs - Number of Steps: 3  Bathroom Shower/Tub: Tub/Shower unit  Bathroom Equipment: Shower chair  Home Equipment: Cane, Rollator    OBJECTIVE:     PAIN: VITAL SIGNS: LINES/DRAINS:   Pre Treatment:         Post Treatment: mild knee pain Vitals        Oxygen    None    RESTRICTIONS/PRECAUTIONS:  Restrictions/Precautions: Weight Bearing  Left Lower Extremity Weight Bearing: Weight Bearing As Tolerated        Restrictions/Precautions: Weight Bearing        LOWER EXTREMITY GROSS EVALUATION:  RIGHT LE   Within Functional Limits   Abnormal   Comments   Strength [x] []     Range of Motion [x] []        LEFT LE Within Functional Limits   Abnormal   Comments   Strength [] []  Can do slr   Range of Motion [] [] AROM LLE (degrees)  LLE AROM : Exceptions  L Knee Flexion 0-145: did not measure but knee bent more than 90 degrees on contact sitting on edge of bed  L Knee Extension 0: 7     UPPER EXTREMITY GROSS EVALUATION:     Within Functional Limits   Abnormal   Comments   Strength [] []    Range of Motion [] []      SENSATION  Sensation [x]       COGNITION/  PERCEPTION: Intact Impaired (Comments):   Orientation [x]     Vision [x]     Hearing []  [place]   Cognition  [x]       MOBILITY: I Mod I S SBA CGA Min Mod Max Total  NT x2 Comments:   Bed Mobility    Rolling [] [] [] [] [] [] [] [] [] [] []    Supine to Sit [] [] [] [] [] [] [] [] [] [] [] On side of bed   Scooting [] [] [] [] [] [] [] [] [] [] []    Sit to Supine [] [] [] [] [] [] [] [] [] [] []    Transfers    Sit to Stand [] [] [] [x] [] [] [] [] [] [] []    Bed to Chair [] [] [] [x] [] [] [] [] [] [] []    Stand to Sit [] [] [] [x] [] [] [] [] [] [] []    Stand Pivot [] [] [] [] [] [] [] [] [] [] []    Toilet [] [] [] [] [] [] [] [] [] [] []     [] [] [] [] [] [] [] [] [] [] []    I=Independent, Mod I=Modified Independent, S=Supervision, SBA=Standby Assistance, CGA=Contact Guard Assistance,   Min=Minimal Assistance, Mod=Moderate Assistance, Max=Maximal Assistance, Total=Total Assistance, NT=Not Tested    BALANCE: Good Fair+ Fair Fair- Poor NT Comments   Sitting Static [x] [] [] [] [] []    Sitting Dynamic [x] [] [] [] [] []              Standing Static [] [x] [] [] [] [] With RW   Standing Dynamic [] [x] [] [] [] [] With RW     PLAN:   FREQUENCY AND DURATION: BID for duration of hospital stay or until stated goals are met, whichever comes first.    THERAPY PROGNOSIS: Good    PROBLEM LIST:   (Skilled intervention is medically necessary to address:)  Decreased ADL/Functional Activities, Decreased Activity Tolerance, Decreased AROM/PROM, Decreased Gait Ability, Decreased Strength, Decreased Transfer Abilities, and Increased Pain   INTERVENTIONS PLANNED:   (Benefits and precautions of physical therapy have been discussed with the patient.)  Therapeutic Activity, Therapeutic Exercise/HEP, Gait Training, Modalities, and Education       TREATMENT:       TREATMENT:   Therapeutic Activity (30 Minutes): Therapeutic activity included Transfer Training, Ambulation on level ground, Stair Training, Sitting balance , Standing balance, and exercises to improve functional Activity tolerance, Balance, Coordination, Mobility, Strength, and ROM.     TREATMENT GRID:  THERAPEUTIC  EXERCISES: DATE:  10/20 DATE:  10/21 DATE:      AM PM AM PM AM PM    [] [] [] [] [] []   Ankle Pumps  10 20      Quad Sets  10 20      Gluteal Sets  10 20      Hip Abd/ADduction  10 20      Straight Leg Raises  10 20      Knee Slides         Short Arc Quads         Chair Slides                           B = bilateral; AA = active assistive; A = active; P = passive      EDUCATION: Education Given To: Patient, Family  Education Provided: Role of Therapy, Home Exercise Program, Transfer Training  Education Method: Demonstration, Verbal  Education Outcome: Verbalized understanding  EDUCATION:  [x] Home Exercises  [x] Fall Precautions  [x] No Pillow Under Knee  [x] D/C Instruction Review [x] Cryocuff  [x] Walker Management/Safety  [] Adaptive Equipment as Needed     AFTER TREATMENT PRECAUTIONS: Bed/Chair Locked, Call light within reach, Chair, Heels floated, Needs within reach, and RN notified    INTERDISCIPLINARY COLLABORATION:  RN/ PCT and OT/ GUO    COMPLIANCE WITH PROGRAM/EXERCISE: compliant most of the time,    RECOMMENDATIONS/INTENT FOR NEXT TREATMENT SESSION: Treatment next visit will focus on increasing Mr. Gutierrezs independence with bed mobility, transfers, gait training, strength/ROM exercises, modalities for pain, and patient education.      TIME IN/OUT:  Time In: 0820  Time Out: 1605  Minutes: [address], PT

## 2022-10-21 NOTE — DISCHARGE INSTRUCTIONS
[facility]      Patient Discharge Instructions    Abby Gregory / [id number] : 1945    Admitted 10/20/2022 Discharged: 10/21/2022     IF YOU HAVE ANY PROBLEMS ONCE YOU ARE AT HOME CALL THE FOLLOWING NUMBERS:   Main office number: [phone]      Medications    The medications you are to continue on are listed on the medication reconciliation sheet. Narcotic pain medications as well as supplemental iron can cause constipation. If this occurs try stopping the narcotic pain medication and/or the iron. It is important that you take the medication exactly as they are prescribed. Medications which increase your risk of blood clots are listed to stop for 5 weeks after surgery as well as medications or supplements which increase your risk of bleeding complications. Keep your medication in the bottles provided by the pharmacist and keep a list of the medication names, dosages, and times to be taken in your wallet. Do not take other medications without consulting your doctor. Important Information    Do NOT smoke as this will greatly increase your risk of infection! Resume your prehospital diet. If you have excessive nausea or vomitting call your doctor's office     Leg swelling and warmth is normal for 6 months after surgery. If you experience swelling in your leg elevate you leg while laying down with your toes above your heart. If you have sudden onset severe swelling with leg pain call our office. The stitches deep inside take approximately 6 months to dissolve. There will be sharp shooting, stinging and burning pain. This is normal and will resolve between 3-6 months after surgery. Difficulty sleeping is normal following total Knee and Hip replacement. You may try melatonin, an over-the-counter sleep aid or benadryl to help with sleep. Most patients will resume sleeping through the night 8 weeks after surgery. Home Physical Therapy is arranged.  Home Health will contact you within 48 hrs of discharge that you have chosen. If you have not received a call within this time frame please contact that provider you chose. You should be given this information before you leave the hospital.     You are at a risk for falls. Use the rolling walker when walking. Patients who have had a joint replacement should not drive if they are still taking narcotic pain mediation during the daytime hours. Most patients wean themselves off of pain medication within 2-5 weeks after surgery. When to Call the office    - If you have a temperature greater then 101  - Uncontrolled vomiting   - Loose control of your bladder or bowel function  - Are unable to bear any wieght   - Need a pain medication refill          Total Knee Replacement: What to Expect at  [address] had a total knee replacement. The doctor replaced the worn ends of the bones that connect to your knee (thighbone and lower leg bone) with plastic and metal parts. When you leave the hospital, you should be able to move around with a walker or crutches. But you will need someone to help you at home until you have more energy and can move around better. You will go home with a bandage and stitches, staples, skin glue, or tape strips. Change the bandage as your doctor tells you to. If you have stitches or staples, your doctor will remove them about 2 weeks after your surgery. Glue or tape strips will fall off on their own over time. You may still have some mild pain, and the area may be swollen for a few months after surgery. Your knee will continue to improve for up to a year. You will probably use a walker for some time after surgery. When you are ready, you can use a cane. You may be able to walk without support after a couple weeks, or when you are comfortable. You will need to do months of physical rehabilitation (rehab) after a knee replacement.  Rehab will help you strengthen the muscles of the knee and help you regain movement. After you recover, your artificial knee will allow you to do normal daily activities with less pain or no pain at all. You may be able to hike, dance, or ride a bike. Talk to your doctor about whether you can do more strenuous activities. Always tell your caregivers that you have an artificial knee. How long it will take to walk on your own, return to normal activities, and go back to work depends on your health and how well your rehabilitation (rehab) program goes. The better you do with your rehab exercises, the quicker you will get your strength and movement back. This care sheet gives you a general idea about how long it will take for you to recover. But each person recovers at a different pace. Follow the steps below to get better as quickly as possible. How can you care for yourself at home? Activity    Rest when you feel tired. You may take a nap, but don't stay in bed all day. When you sit, use a chair with arms. You can use the arms to help you stand up. Work with your physical therapist to find the best way to exercise. What you can do as your knee heals will depend on whether your new knee is cemented or uncemented. You may not be able to do certain things for a while if your new knee is uncemented. After your knee has healed enough, you can do more strenuous activities with caution. You can golf, but you may want to use a golf cart for some time. And don't wear shoes with spikes. You can bike on a flat road or on a stationary bike. Talk to your doctor before biking uphill. Your doctor may suggest that you stay away from activities that put stress on your knee. These include tennis, badminton, contact sports like football, jumping (such as in basketball), jogging, and running. Avoid activities where you might fall. Do not sit for more than 1 hour at a time. Get up and walk around for a while before you sit again.  If you must sit for a long time, prop up your leg with a chair or footstool. This will help you avoid swelling. Ask your doctor when you can drive again. It may take several weeks after knee replacement surgery before it's safe for you to drive. When you get into a car, sit on the edge of the seat. Then pull in your legs, and turn to face the front. You should be able to do many everyday activities 3 to 6 weeks after your surgery. You will probably need to take 4 to 16 weeks off from work. When you can go back to work depends on the type of work you do and how you feel. Ask your doctor when it is okay for you to have sex. For 12 weeks, do not lift anything heavier than 10 pounds and do not lift weights. Diet    By the time you leave the hospital, you should be eating your normal diet. If your stomach is upset, try bland, low-fat foods like plain rice, broiled chicken, toast, and yogurt. Your doctor may suggest that you take iron and vitamin supplements. Drink plenty of fluids (unless your doctor tells you not to). Eat healthy foods, and watch your portion sizes. Try to stay at your ideal weight. Too much weight puts more stress on your new knee. You may notice that your bowel movements are not regular right after your surgery. This is common. Try to avoid constipation and straining with bowel movements. Drinking enough fluids, taking a stool softener, and eating foods that are good sources of fiber can help you avoid constipation. If you have not had a bowel movement after a couple of days, talk to your doctor. Medicines    Your doctor will tell you if and when you can restart your medicines. You will also get instructions about taking any new medicines. If you stopped taking aspirin or some other blood thinner, your doctor will tell you when to start taking it again. Your doctor may give you a blood-thinning medicine to prevent blood clots.  If you take a blood thinner, be sure you get instructions about how to take your medicine safely. Blood thinners can cause serious bleeding problems. This medicine could be in pill form or as a shot (injection). If a shot is needed, your doctor will tell you how to do this. Be safe with medicines. Take pain medicines exactly as directed. If the doctor gave you a prescription medicine for pain, take it as prescribed. If you are not taking a prescription pain medicine, ask your doctor if you can take an over-the-counter medicine. Plan to take your pain medicine 30 minutes before exercises. It is easier to prevent pain before it starts than to stop it after it has started. If you think your pain medicine is making you sick to your stomach: Take your medicine after meals (unless your doctor has told you not to). Ask your doctor for a different pain medicine. If your doctor prescribed antibiotics, take them as directed. Do not stop taking them just because you feel better. You need to take the full course of antibiotics. Incision care    If your doctor told you how to care for your cut (incision), follow your doctor's instructions. You will have a dressing over the cut. A dressing helps the incision heal and protects it. Your doctor will tell you how to take care of this. If you did not get instructions, follow this general advice: If you have strips of tape on the cut the doctor made, leave the tape on for a week or until it falls off. If you have stitches or staples, your doctor will tell you when to come back to have them removed. If you have skin glue on the cut, leave it on until it falls off. Skin glue is also called skin adhesive or liquid stitches. Change the bandage every day. Wash the area daily with warm water, and pat it dry. Don't use hydrogen peroxide or alcohol. They can slow healing. You may cover the area with a gauze bandage if it oozes fluid or rubs against clothing. You may shower 24 to 48 hours after surgery. Pat the incision dry.  Don't swim or take a bath for the first 2 weeks, or until your doctor tells you it is okay. Exercise    Your rehab program will give you a number of exercises to do to help you get back your knee's range of motion and strength. Always do them as your therapist tells you. Ice    For pain and swelling, put ice or a cold pack on the area for 10 to 20 minutes at a time. Put a thin cloth between the ice and your skin. If your doctor recommended cold therapy using a portable machine, follow the instructions that came with the machine. Other instructions    Wear compression stockings if your doctor told you to. These may help to prevent blood clots. Your doctor will tell you how long you need to keep wearing the compression stockings. Carry a medical alert card that says you have an artificial joint. You have metal pieces in your knee. These may set off some airport metal detectors. Follow-up care is a key part of your treatment and safety. Be sure to make and go to all appointments, and call your doctor if you are having problems. It's also a good idea to know your test results and keep a list of the medicines you take. When should you call for help? Call 911 anytime you think you may need emergency care. For example, call if:    You passed out (lost consciousness). You have severe trouble breathing. You have sudden chest pain and shortness of breath, or you cough up blood. Call your doctor now or seek immediate medical care if:    You have signs of infection, such as: Increased pain, swelling, warmth, or redness. Red streaks leading from the incision. Pus draining from the incision. A fever. You have signs of a blood clot, such as:  Pain in your calf, back of the knee, thigh, or groin. Redness and swelling in your leg or groin. Your incision comes open and begins to bleed, or the bleeding increases. You have pain that does not get better after you take pain medicine.    Watch closely for changes in your health, and be sure to contact your doctor if:    You do not have a bowel movement after taking a laxative. Where can you learn more? Go to [email or web address].makerist. org and sign in to your [employer] account. Enter G669 in the [facility] box to learn more about \"Total Knee Replacement: What to Expect at Home. \"     If you do not have an account, please click on the \"Sign Up Now\" link. Current as of: March 9, 2022               Content Version: 13.4  © 9799-4637 [employer], [employer]. Care instructions adapted under license by [facility] [address] If you have questions about a medical condition or this instruction, always ask your healthcare professional. [address] any warranty or liability for your use of this information. Information obtained by :  I understand that if any problems occur once I am at home I am to contact my physician. I understand and acknowledge receipt of the instructions indicated above.                                                                                                                                            Physician's or R.N.'s Signature                                                                  Date/Time                                                                                                                                              Patient or Representative Signature                                                          Date/Time

## 2022-10-21 NOTE — PROGRESS NOTES
OCCUPATIONAL THERAPY Initial Assessment and AM      (Link to Caseload Tracking: OT Visit Days: 1  OT Orders   Time  OT Charge Capture  Rehab Caseload Tracker  Episode     Nikky Scott is a 64 y.o. male   PRIMARY DIAGNOSIS: Degenerative arthritis of left knee  Degenerative arthritis of left knee [M17.12]  Procedure(s) (LRB):  KNEE TOTAL ARTHROPLASTY ROBOTIC LEFT SDD (Left)  1 Day Post-Op  Reason for Referral: Pain in Left Knee (M25.562)  Stiffness of Left Knee, Not elsewhere classified (S76.730)  Other lack of cordination (R27.8)  Difficulty in walking, Not elsewhere classified (R26.2)  Other abnormalities of gait and mobility (R26.89)  History of falling (Z91.81)  Outpatient Surgery: Payor: Chacorta Mueller / Plan: Chacorta End / Product Type: *No Product type* /     ASSESSMENT:     REHAB RECOMMENDATIONS:   Recommendation to date pending progress:  Setting:  Home Health Therapy    Equipment:    Rolling Walker     ASSESSMENT:  Mr. Jamel Pickering is s/p left TKA and presents with decreased independence with functional mobility and activities of daily living as compared to baseline level of function and safety. Patient would benefit from skilled Occupational Therapy to maximize independence and safety with self-care task and functional mobility. Patient  supine in bed, his knee bandage has some blood at top of bandage. He transferred to EOB with SBA. He donned his clothes with min assit. He ambulated with CGA. He is impulsive and reported he has had many falls. Most recently this past weekend. He performed stairs and returend to his room. Recommend he spend the night and discharge home tomorrow. His two friends present and reported he has had many falls and are in agreement with POC. Will see in am for full ADL session. He has a tab alert in place. Nursing aware of patient status and therapy recommendations. 10/21/22 915 He ambulated to the bathroom with SBA.  He undressed showered and redressed with min assist. He donned his shoes and toileted with SBA. He stood at the sink and washed hands and brushed teeth. He returned to the recliner and was set up wit all needs. He plans to discharge home today. He was much safer today. He met his goals. Will discharge OT.      [address] [facility] [address] [address] [facility] [facility] Clicks Daily Activity Inpatient Short Form:    AM-PAC Daily Activity Inpatient   How much help for putting on and taking off regular lower body clothing?: A Little  How much help for Bathing?: A Little  How much help for Toileting?: A Little  How much help for putting on and taking off regular upper body clothing?: None  How much help for taking care of personal grooming?: None  How much help for eating meals?: None  [facility] Inpatient Daily Activity Raw Score: 21  AM-PAC Inpatient ADL T-Scale Score : 44.27  ADL Inpatient CMS 0-100% Score: 32.79  ADL Inpatient CMS G-Code Modifier : CJ     SUBJECTIVE:     Mr. Claire Akhtar stated he wanted to use the commode      Social/Functional   Lives With: Spouse  Home Layout: One level  Home Access: Stairs to enter with rails  Entrance Stairs - Number of Steps: 3  Bathroom Shower/Tub: Tub/Shower unit  Bathroom Equipment: Shower chair  Home Equipment: Cane, Rollator    OBJECTIVE:     Gladis Blazing / Dany Harish / AIRWAY: NA    RESTRICTIONS/PRECAUTIONS:  Restrictions/Precautions: Weight Bearing  Left Lower Extremity Weight Bearing: Weight Bearing As Tolerated    PAIN: VITALS / O2:   Pre Treatment:        0/10  Post Treatment: 0/10 Vitals          Oxygen        GROSS EVALUATION: INTACT IMPAIRED   (See Comments)   UE AROM [x] []   UE PROM [] []   Strength [x]       Posture / Balance []  SBA  in standing   Sensation []     Coordination []   SBA  in standing     Tone []       Edema []    Activity Tolerance []    Fair with mobility   Hand Dominance R [] L []      COGNITION/  PERCEPTION: INTACT IMPAIRED   (See Comments)   Orientation [x]     Vision [x]     Hearing [x]     Cognition  [x]     Perception [] Cues for safety      MOBILITY: I Mod I S SBA CGA Min Mod Max Total  NT x2 Comments:   Bed Mobility    Rolling [] [] [] [] [] [] [] [] [] [] []    Supine to Sit [] [] [] [] [] [] [] [] [] [] []    Scooting [] [] [] [] [] [] [] [] [] [] []    Sit to Supine [] [] [] [] [] [] [] [] [] [] []    Transfers    Sit to Stand [] [] [] [x] [] [] [] [] [] [] []    Bed to Chair [] [] [] [] [] [] [] [] [] [] []    Stand to Sit [] [] [] [x] [] [] [] [] [] [] []    Tub/Shower [] [] [] [x] [] [] [] [] [] [] []       Toilet [] [] [] [x] [] [] [] [] [] [] []        [] [] [] [] [] [] [] [] [] [] []    I=Independent, Mod I=Modified Independent, S=Supervision/Setup, SBA=Standby Assistance, CGA=Contact Guard Assistance, Min=Minimal Assistance, Mod=Moderate Assistance, Max=Maximal Assistance, Total=Total Assistance, NT=Not Tested    ACTIVITIES OF DAILY LIVING: I Mod I S SBA CGA Min Mod Max Total NT Comments   BASIC ADLs:              Upper Body Bathing [] [] [x] [] [] [] [] [] [] []    Lower Body Bathing [] [] [x] [] [] [] [] [] [] []    Toileting [] [] [] [x] [] [] [] [] [] []    Upper Body Dressing [] [] [x] [] [] [] [] [] [] []    Lower Body Dressing [] [] [] [] [] [x] [] [] [] []    Feeding [] [] [x] [] [] [] [] [] [] []    Grooming [] [] [x] [] [] [] [] [] [] []    Personal Device Care [] [] [] [] [] [] [] [] [] []    Functional Mobility [] [] [] [x] [] [] [] [] [] []    I=Independent, Mod I=Modified Independent, S=Supervision/Setup, SBA=Standby Assistance, CGA=Contact Guard Assistance, Min=Minimal Assistance, Mod=Moderate Assistance, Max=Maximal Assistance, Total=Total Assistance, NT=Not Tested    PLAN:     FREQUENCY/DURATION   OT Plan of Care: 1 time/week, 2 times/week for duration of hospital stay or until stated goals are met, whichever comes first.    ACUTE OCCUPATIONAL THERAPY GOALS:   (Developed with and agreed upon by patient and/or caregiver.)    GOALS:   DISCHARGE GOALS (in preparation for going home/rehab):  3 days  1.    Asa Arias will perform lower body dressing activity with minimal assist required to demonstrate improved functional mobility and safety. -GOAL MET 10/20/22     2. Mr. Asa Arias will perform bathing activity with minimal assist required to demonstrate improved functional mobility and safety. -GOAL MET 10/21/22     3. Mr. Asa Arias will perform toileting activity with  contact guard assist to demonstrate improved functional mobility and safety. -GOAL MET 10/21/22     4. Mr. Asa Arias will perform all functional transfers transfer with contact guard assist to demonstrate improved functional mobility and safety. -GOAL MET 10/21/22         PROBLEM LIST:   (Skilled intervention is medically necessary to address:)  Decreased ADL/Functional Activities  Decreased Balance  Decreased Coordination  Decreased Gait Ability  Decreased Safety Awareness  Decreased Strength  Decreased Transfer Abilities  Increased Pain   INTERVENTIONS PLANNED:   (Benefits and precautions of occupational therapy have been discussed with the patient.)  Self Care Training  Therapeutic Activity  Therapeutic Exercise/HEP  Neuromuscular Re-education  Education         TREATMENT:        TREATMENT:   SELF CARE: (30 minutes):   Procedure(s) (per grid) utilized to improve and/or restore self-care/home management as related to dressing, bathing, toileting, grooming, self feeding, and functional mobility . Required minimal visual, verbal, manual, and tactile cueing to facilitate activities of daily living skills, compensatory activities, and OT poc and safety with mobility .          AFTER TREATMENT PRECAUTIONS: Alarm Activated, Bed/Chair Locked, Call light within reach, Chair, Needs within reach, and RN notified    INTERDISCIPLINARY COLLABORATION:  RN/ PCT, PT/ PTA, OT/ GUO, and RN Case Manager/      EDUCATION:  Education Given To: Patient, Family  Education Provided: Role of Therapy, Plan of Care, Precautions, ADL Adaptive Strategies, Transfer Training, IADL Safety, Family Education, Fall Prevention Strategies, Equipment  Education Method: Verbal, Demonstration  Barriers to Learning: Other (Comment) (poor safety awareness)  Education Outcome: Verbalized understanding, Continued education needed  [x] Safe And Effective Hygiene  [x] Fall Precautions  [] Hip Precautions  [] D/C Instruction Review [] Prosthesis Review  [x] Walker Management/Safety  [x] Adaptive Equipment as Needed  [x] Therapeutic Resting Position of Joint       TOTAL TREATMENT DURATION AND TIME:  Time In: 0915  Time Out: 0945  Minutes: Mauricio Crawley OT

## 2022-10-21 NOTE — PROGRESS NOTES
2022         Post Op day: 1 Day Post-Op   Admit Diagnosis: Degenerative arthritis of left knee [M17.12]  LAB:    Recent Results (from the past 24 hour(s))   Hemoglobin and Hematocrit    Collection Time: 10/21/22  3:40 AM   Result Value Ref Range    Hemoglobin 11.8 (L) 13.6 - 17.2 g/dL    Hematocrit 36.8 (L) 41.1 - 50.3 %     Vital Signs:    Patient Vitals for the past 8 hrs:   BP Temp Temp src Pulse Resp SpO2   10/21/22 0730 (!) 148/90 98.4 °F (36.9 °C) Oral 74 18 92 %   10/21/22 0347 131/80 98.2 °F (36.8 °C) Oral 79 18 91 %     Temp (24hrs), Av.2 °F (36.8 °C), Min:97.9 °F (36.6 °C), Max:98.4 °F (36.9 °C)    Pain Control:      Subjective: Doing well, normal recovery experienced. Objective:  No Acute Distress, Alert and Oriented, Neurovascular exam is normal       Assessment:   Patient Active Problem List   Diagnosis    Cellulitis and abscess of leg, except foot    Ventral hernia    Scabies    Lower GI bleed    Mixed hyperlipidemia    History of prostate cancer    Actinic keratosis    Groin mass    Essential hypertension, benign    Hypercalcemia    Degenerative arthritis of left knee       Status Post Procedure(s) (LRB):  KNEE TOTAL ARTHROPLASTY ROBOTIC LEFT SDD (Left)        Plan: Continue Physical Therapy, discharge home anticipated.    Signed By: Sung Portillo MD

## 2022-10-21 NOTE — Clinical Note
SITUATION. 64year old male patient s/p L TKA 10/20/22 per Dr Elly Singh. Hospitalized 10/20/22 through 10/21/22. History of arthritis, CAD, CABG, cancer, cellulitis and abscess of leg, HTN, heart failure, MI, prostate cancer, HLD, scabies, hearing aid, partial thyroid, hernia repair, AAA open repair, L knee arthroscopy, prostatectomy   BACKGROUND. .Lives with wife. Home is single level with several steps to enter. Patient has a RW.   ASSESSMENT. Lucyann Push Lucyann Push Patient alert and oriented with good participation during visit. Pt presents with L TKA with aquacel dressing in place and intact with no drainage saturating through and no signs or symptoms of infection. Treatment: Instructed patient in infection control, taking medication as directed, use and application of CP, and signs and symptoms of DVT.  Instructed supine and seated TKA HEP protocol 20xeach of AP, QS, GS, hip abd/add, HS, SAQ  Frequency: 1w1, 3w1, 2w2  Follow up: TKA protocol

## 2022-10-21 NOTE — PROGRESS NOTES
Discharge instructions discussed with patient and spouse  All Ivs removed  Opportunity for questions provided.   Prescriptions sent to pharmacy by MD  [address] [place] [address] to follow

## 2022-10-21 NOTE — PLAN OF CARE
Shift assessment complete. Patient is alert and oriented, in bed. Respirations are even and unlabored. Patient is on room air. Ongoing productive cough present. Bowel sounds are present. Left lower extremity is cool to touch, pulse present. Patient is able to Dorsi and Plantar flex. Full sensation present. Patient has voided. No needs expressed at this time.    Bed low and locked, call light within reach.   ____________________________________________    Problem: Pain  Goal: Verbalizes/displays adequate comfort level or baseline comfort level  Outcome: Progressing     Problem: Safety - Adult  Goal: Free from fall injury  Outcome: Progressing     Problem: ABCDS Injury Assessment  Goal: Absence of physical injury  Outcome: Progressing

## 2022-10-21 NOTE — HOME HEALTH
SITUATION. 64year old male patient s/p L TKA 10/20/22 per Dr Canelo Geiger. Hospitalized 10/20/22 through 10/21/22. History of arthritis, CAD, CABG, cancer, cellulitis and abscess of leg, HTN, heart failure, MI, prostate cancer, HLD, scabies, hearing aid, partial thyroid, hernia repair, AAA open repair, L knee arthroscopy, prostatectomy   BACKGROUND. .Lives with wife. Home is single level with several steps to enter. Patient has a RW.   ASSESSMENT. Les Pimjhony De La Torre Pimjhony Patient alert and oriented with good participation during visit. Pt presents with L TKA with aquacel dressing in place and intact with no drainage saturating through and no signs or symptoms of infection. Treatment: Instructed patient in infection control, taking medication as directed, use and application of CP, and signs and symptoms of DVT.  Instructed supine and seated TKA HEP protocol 20xeach of AP, QS, GS, hip abd/add, HS, SAQ  Frequency: 1w1, 3w1, 2w2  Follow up: TKA protocol

## 2022-10-22 PROCEDURE — 1090000001 HH PPS REVENUE CREDIT

## 2022-10-22 PROCEDURE — 1090000002 HH PPS REVENUE DEBIT

## 2022-10-23 PROCEDURE — 1090000002 HH PPS REVENUE DEBIT

## 2022-10-23 PROCEDURE — 1090000001 HH PPS REVENUE CREDIT

## 2022-10-24 ENCOUNTER — HOME CARE VISIT (OUTPATIENT)
Dept: SCHEDULING | Facility: HOME HEALTH | Age: 77
End: 2022-10-24
Payer: MEDICARE

## 2022-10-24 VITALS
SYSTOLIC BLOOD PRESSURE: 116 MMHG | HEART RATE: 60 BPM | TEMPERATURE: 97.7 F | RESPIRATION RATE: 17 BRPM | OXYGEN SATURATION: 100 % | DIASTOLIC BLOOD PRESSURE: 70 MMHG

## 2022-10-24 PROCEDURE — 1090000002 HH PPS REVENUE DEBIT

## 2022-10-24 PROCEDURE — G0157 HHC PT ASSISTANT EA 15: HCPCS

## 2022-10-24 PROCEDURE — 1090000001 HH PPS REVENUE CREDIT

## 2022-10-25 PROCEDURE — 1090000002 HH PPS REVENUE DEBIT

## 2022-10-25 PROCEDURE — 1090000001 HH PPS REVENUE CREDIT

## 2022-10-26 ENCOUNTER — HOME CARE VISIT (OUTPATIENT)
Dept: SCHEDULING | Facility: HOME HEALTH | Age: 77
End: 2022-10-26
Payer: MEDICARE

## 2022-10-26 VITALS
HEART RATE: 74 BPM | RESPIRATION RATE: 17 BRPM | DIASTOLIC BLOOD PRESSURE: 68 MMHG | TEMPERATURE: 97.7 F | SYSTOLIC BLOOD PRESSURE: 112 MMHG | OXYGEN SATURATION: 95 %

## 2022-10-26 PROCEDURE — G0157 HHC PT ASSISTANT EA 15: HCPCS

## 2022-10-26 PROCEDURE — 1090000001 HH PPS REVENUE CREDIT

## 2022-10-26 PROCEDURE — 1090000002 HH PPS REVENUE DEBIT

## 2022-10-27 PROCEDURE — 1090000002 HH PPS REVENUE DEBIT

## 2022-10-27 PROCEDURE — 1090000001 HH PPS REVENUE CREDIT

## 2022-10-28 ENCOUNTER — HOME CARE VISIT (OUTPATIENT)
Dept: SCHEDULING | Facility: HOME HEALTH | Age: 77
End: 2022-10-28
Payer: MEDICARE

## 2022-10-28 VITALS
HEART RATE: 80 BPM | TEMPERATURE: 97.5 F | OXYGEN SATURATION: 96 % | SYSTOLIC BLOOD PRESSURE: 118 MMHG | RESPIRATION RATE: 17 BRPM | DIASTOLIC BLOOD PRESSURE: 70 MMHG

## 2022-10-28 PROCEDURE — 1090000002 HH PPS REVENUE DEBIT

## 2022-10-28 PROCEDURE — G0157 HHC PT ASSISTANT EA 15: HCPCS

## 2022-10-28 PROCEDURE — 1090000001 HH PPS REVENUE CREDIT

## 2022-10-28 ASSESSMENT — ENCOUNTER SYMPTOMS: PAIN LOCATION - PAIN QUALITY: STABBING

## 2022-10-29 PROCEDURE — 1090000001 HH PPS REVENUE CREDIT

## 2022-10-29 PROCEDURE — 1090000002 HH PPS REVENUE DEBIT

## 2022-10-30 PROCEDURE — 1090000002 HH PPS REVENUE DEBIT

## 2022-10-30 PROCEDURE — 1090000001 HH PPS REVENUE CREDIT

## 2022-10-31 ENCOUNTER — HOME CARE VISIT (OUTPATIENT)
Dept: SCHEDULING | Facility: HOME HEALTH | Age: 77
End: 2022-10-31
Payer: MEDICARE

## 2022-10-31 PROCEDURE — G0157 HHC PT ASSISTANT EA 15: HCPCS

## 2022-10-31 PROCEDURE — 1090000001 HH PPS REVENUE CREDIT

## 2022-10-31 PROCEDURE — 1090000002 HH PPS REVENUE DEBIT

## 2022-11-01 VITALS
TEMPERATURE: 97.5 F | SYSTOLIC BLOOD PRESSURE: 116 MMHG | RESPIRATION RATE: 17 BRPM | HEART RATE: 80 BPM | DIASTOLIC BLOOD PRESSURE: 70 MMHG | OXYGEN SATURATION: 99 %

## 2022-11-01 PROCEDURE — 1090000002 HH PPS REVENUE DEBIT

## 2022-11-01 PROCEDURE — 1090000001 HH PPS REVENUE CREDIT

## 2022-11-01 ASSESSMENT — ENCOUNTER SYMPTOMS: PAIN LOCATION - PAIN QUALITY: SORE, ACHING

## 2022-11-02 PROCEDURE — 1090000002 HH PPS REVENUE DEBIT

## 2022-11-02 PROCEDURE — 1090000001 HH PPS REVENUE CREDIT

## 2022-11-03 ENCOUNTER — HOME CARE VISIT (OUTPATIENT)
Dept: SCHEDULING | Facility: HOME HEALTH | Age: 77
End: 2022-11-03
Payer: MEDICARE

## 2022-11-03 VITALS
OXYGEN SATURATION: 95 % | HEART RATE: 72 BPM | DIASTOLIC BLOOD PRESSURE: 70 MMHG | TEMPERATURE: 97.7 F | RESPIRATION RATE: 17 BRPM | SYSTOLIC BLOOD PRESSURE: 114 MMHG

## 2022-11-03 PROCEDURE — G0157 HHC PT ASSISTANT EA 15: HCPCS

## 2022-11-03 PROCEDURE — 1090000002 HH PPS REVENUE DEBIT

## 2022-11-03 PROCEDURE — 1090000001 HH PPS REVENUE CREDIT

## 2022-11-04 PROCEDURE — 1090000002 HH PPS REVENUE DEBIT

## 2022-11-04 PROCEDURE — 1090000001 HH PPS REVENUE CREDIT

## 2022-11-05 PROCEDURE — 1090000001 HH PPS REVENUE CREDIT

## 2022-11-05 PROCEDURE — 1090000002 HH PPS REVENUE DEBIT

## 2022-11-06 PROCEDURE — 1090000001 HH PPS REVENUE CREDIT

## 2022-11-06 PROCEDURE — 1090000002 HH PPS REVENUE DEBIT

## 2022-11-07 ENCOUNTER — HOME CARE VISIT (OUTPATIENT)
Dept: SCHEDULING | Facility: HOME HEALTH | Age: 77
End: 2022-11-07
Payer: MEDICARE

## 2022-11-07 VITALS
HEART RATE: 68 BPM | OXYGEN SATURATION: 99 % | RESPIRATION RATE: 17 BRPM | SYSTOLIC BLOOD PRESSURE: 126 MMHG | DIASTOLIC BLOOD PRESSURE: 78 MMHG | TEMPERATURE: 97.7 F

## 2022-11-07 PROCEDURE — G0157 HHC PT ASSISTANT EA 15: HCPCS

## 2022-11-07 PROCEDURE — 1090000002 HH PPS REVENUE DEBIT

## 2022-11-07 PROCEDURE — 1090000001 HH PPS REVENUE CREDIT

## 2022-11-07 ASSESSMENT — ENCOUNTER SYMPTOMS: PAIN LOCATION - PAIN QUALITY: SORE, ACHE

## 2022-11-08 PROCEDURE — 1090000002 HH PPS REVENUE DEBIT

## 2022-11-08 PROCEDURE — 1090000001 HH PPS REVENUE CREDIT

## 2022-11-09 PROCEDURE — 1090000002 HH PPS REVENUE DEBIT

## 2022-11-09 PROCEDURE — 1090000001 HH PPS REVENUE CREDIT

## 2022-11-10 ENCOUNTER — HOME CARE VISIT (OUTPATIENT)
Dept: SCHEDULING | Facility: HOME HEALTH | Age: 77
End: 2022-11-10
Payer: MEDICARE

## 2022-11-10 ENCOUNTER — TELEPHONE (OUTPATIENT)
Dept: ORTHOPEDIC SURGERY | Age: 77
End: 2022-11-10

## 2022-11-10 VITALS
SYSTOLIC BLOOD PRESSURE: 110 MMHG | TEMPERATURE: 97.7 F | DIASTOLIC BLOOD PRESSURE: 64 MMHG | RESPIRATION RATE: 16 BRPM | HEART RATE: 60 BPM | OXYGEN SATURATION: 93 %

## 2022-11-10 PROCEDURE — G0151 HHCP-SERV OF PT,EA 15 MIN: HCPCS

## 2022-11-10 ASSESSMENT — ENCOUNTER SYMPTOMS
PAIN LOCATION - PAIN QUALITY: ACHES, SORE
DYSPNEA ACTIVITY LEVEL: AFTER AMBULATING MORE THAN 20 FT

## 2022-11-10 NOTE — TELEPHONE ENCOUNTER
Veronica with [facility] phone# [phone] if need, needs a antibiotic protocol faxed# [phone]

## 2022-11-17 ENCOUNTER — HOSPITAL ENCOUNTER (OUTPATIENT)
Dept: PHYSICAL THERAPY | Age: 77
Setting detail: RECURRING SERIES
Discharge: HOME | End: 2022-11-20
Payer: MEDICARE

## 2022-11-17 PROCEDURE — 97162 PT EVAL MOD COMPLEX 30 MIN: CPT

## 2022-11-17 ASSESSMENT — PAIN SCALES - GENERAL: PAINLEVEL_OUTOF10: 1

## 2022-11-17 NOTE — PROGRESS NOTES
Nicole Mcgarry  : 1945  Primary: [employer]  Secondary: [address]  [facility] [address]  Phone: [phone]  Fax: [phone] Plan Frequency: 2-3 times/week  Plan of Care/Certification Expiration Date: 02/15/23    PT Visit Info:     Visit Count:  1   OUTPATIENT PHYSICAL THERAPY:OP NOTE TYPE: Treatment Note 2022       Episode  }Appt Desk             Treatment Diagnosis:  Pain in Left Knee (M25.562)  Stiffness of Left Knee, Not elsewhere classified (M25.662)  Other abnormalities of gait and mobility (R26.89)  Generalized Muscle Weakness (M62.81)    Goals: (Goals have been discussed and agreed upon with patient.)  Short-Term Functional Goals: Time Frame: 4 weeks  Pt to report compliance with HEP  Pt to restore full knee ext without pain  Pt to demonstrate normal gait level surfaces without AD  Discharge Goals: Time Frame: 12 weeks  Pt to increase strength for sit to stand x 30 reps without UE assist  Pt to increase strength for reciprocal gait on stairs with control  Pt to increase SLS > 10 sec B for safe amb all surfaces    Medical/Referring Diagnosis:  Status post left knee replacement [D86.185]  Referring Physician:  Quincy Giron MD MD Orders:  PT Eval and Treat   Date of Onset:  Onset Date: 10/20/22   Allergies:   Penicillins  Restrictions/Precautions:  Restrictions/Precautions: Weight Bearing  Left Lower Extremity Weight Bearing: Weight Bearing As Tolerated     Interventions Planned (Treatment may consist of any combination of the following):    Current Treatment Recommendations: Strengthening; ROM; Balance training; Transfer training; Gait training; Stair training; Pain management; Home exercise program; Patient/Caregiver education & training; Modalities; Aquatics     Subjective Comments:Pt presents with L knee pain, weakness, stiffness, decreased balance, altered gait following L TKA. Initial:1/10                             Post Session:  1/10  Medications Last Reviewed:  11/17/2022  Updated Objective Findings:  See evaluation note from today    Observation, Palpation, and Special Tests   Mod swelling L knee  Steri-strips removed - incision clean, closed  Good patellar mobility   Stands with L knee flexed      ROM   Knee flex-ext R 140-0                        L 130-5      Strength   Good quad set B  Mild ext lag L  5/5 B LE myotomes      Functional Mobility, Balance, and Gait   Stairs - step-to pattern   SLS - R 3 sec, L 2 sec   Sit to stand - UE assist, weight on R primarily   Gait - antalgic with straight cane, decreased stride length and stance time L     Outcome Measure: Tool Used: Knee injury and Osteoarthritis Outcome Score for Joint Replacement (KOOS, JR)  Score:  Initial: 12 (Interval: 57.140) 11/17/2022 Most Recent: TBD   Interpretation of Score: The KOOS, JR contains 7 items from the original KOOS survey. Items are coded from 0 to 4, none to extreme respectively. Marquise Nogueira is scored by summing the raw response (range 0-28) and then converting it to an interval score using the table provided below. The interval score ranges from 0 to 100 where 0 represents total knee disability and 100 represents perfect knee health. Treatment   THERAPEUTIC ACTIVITY: ( see below for minutes): Therapeutic activities per grid below to improve mobility. Required moderate verbal and manual cues to improve functional mobility . THERAPEUTIC EXERCISE: (see below for minutes):  Exercises per grid below to improve strength. Required moderate verbal and manual cues to promote proper body alignment, promote proper body posture, promote proper body mechanics and promote proper body breathing techniques. Progressed resistance, range, repetitions and complexity of movement as indicated.   MANUAL THERAPY: (see below for minutes): Joint mobilization and Soft tissue mobilization was utilized and necessary because of the patient's restricted joint motion, painful spasm, loss of articular motion and restricted motion of soft tissue. MODALITIES: (see below for minutes):      for pain modulation    AQUATIC THERAPY (see below for minutes): Aquatic treatment performed per flow grid for Decreased muscle strength, Decreased endurance, Decreased static/dynamic balance and reactive control, Decreased activity endurance, Decompression, Ease of movement and Low impact and reduced weight bearing activity. Date: 11/17/22       Modalities:        Game ready L knee                Manual Therapy:                        Aquatics Activities:        GAIT (F/B/S/M)        SLR gait        Hamstring Curl gait         Rockettes        Squats        Calf raises        Hamstring stretch B        Piriformis stretch B        Step ups ant B        SLS B        Deep well                        Therapeutic Exercises: Not billable       Pt education, postural education, HEP, functional breathing        Quad set L        SLR L        LAQ L        Prone lying for ext ROM                HEP: see hand out       Treatment/Session Summary:    Treatment Assessment: Pt had good understanding and torie to information provided. Pt's wife was involved when pt was instructed in HEP. Communication/Consultation:  None today  Equipment provided today:  None  Recommendations/Intent for next treatment session: Next visit will focus on stretching, strengthening, modalities as indicated.     Total Treatment Billable Duration:  20 minutes  Time In: 0816  Time Out: 4634    EUVQU [id number], PT       Charge Capture  }Post Session Pain  PT Visit Info  [employer] [facility] Portal  MD Guidelines  Scanned Media  Benefits  MyChart    Future Appointments   Date Time Provider Mike Ayon   11/21/2022 11:00 AM Davon Dunn, PT [facility]   11/23/2022 10:30 AM MD YURIY Bundy [facility]   11/23/2022 11:00 AM Davon Dunn PT [facility]   11/30/2022 11:00 AM Cezar Khan Leighann, PT [facility]   12/2/2022 11:00 AM Argentina Sparrow PTA [facility]   1/19/2023  8:45 AM [facility]   1/26/2023  9:45 AM Tera Noel DO [facility]
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
## 2022-11-21 ENCOUNTER — HOSPITAL ENCOUNTER (OUTPATIENT)
Dept: PHYSICAL THERAPY | Age: 77
Setting detail: RECURRING SERIES
End: 2022-11-21
Payer: MEDICARE

## 2022-11-23 ENCOUNTER — OFFICE VISIT (OUTPATIENT)
Dept: ORTHOPEDIC SURGERY | Age: 77
End: 2022-11-23

## 2022-11-23 ENCOUNTER — HOSPITAL ENCOUNTER (OUTPATIENT)
Dept: PHYSICAL THERAPY | Age: 77
Setting detail: RECURRING SERIES
End: 2022-11-23
Payer: MEDICARE

## 2022-11-23 DIAGNOSIS — Z96.652 STATUS POST LEFT KNEE REPLACEMENT: Primary | ICD-10-CM

## 2022-11-23 PROCEDURE — 99024 POSTOP FOLLOW-UP VISIT: CPT | Performed by: ORTHOPAEDIC SURGERY

## 2022-11-23 NOTE — PROGRESS NOTES
Name: Rosy Perez  YOB: 1945  Gender: male  MRN: [id number]      Current Outpatient Medications:     Ferrous Gluconate 256 (28 Fe) MG TABS, Take 1 capsule by mouth daily. , Disp: , Rfl:     pravastatin (PRAVACHOL) 80 MG tablet, Take 80 mg by mouth daily. , Disp: , Rfl:     acetaminophen (TYLENOL) 500 MG tablet, Take 1,000 mg by mouth every 6 hours as needed for Pain (breakthrough), Disp: , Rfl:     aspirin 81 MG EC tablet, Take 1 tablet by mouth 2 times daily, Disp: 70 tablet, Rfl: 0    ondansetron (ZOFRAN-ODT) 4 MG disintegrating tablet, Take 1 tablet by mouth every 8 hours as needed for Nausea or Vomiting, Disp: 20 tablet, Rfl: 0    B Complex-C-Folic Acid (STRESS B COMPLEX PO), Take 1 tablet by mouth daily, Disp: , Rfl:     Multiple Vitamin (MULTIVITAMIN ADULT PO), Take 1 tablet by mouth daily, Disp: , Rfl:     CPAP Machine MISC, Use as Instructed. CPAP [id number], Disp: , Rfl:     calcium carbonate (OSCAL) 500 MG TABS tablet, Take 1,500 mg by mouth in the morning and 1,500 mg before bedtime. , Disp: , Rfl:     vitamin D 25 MCG (1000 UT) CAPS, Take 1,000 Units by mouth daily, Disp: , Rfl:     escitalopram (LEXAPRO) 10 MG tablet, Take 10 mg by mouth daily, Disp: , Rfl:     fluticasone (FLONASE) 50 MCG/ACT nasal spray, 2 sprays by Nasal route at bedtime, Disp: , Rfl:     lisinopril (PRINIVIL;ZESTRIL) 5 MG tablet, Take 5 mg by mouth daily, Disp: , Rfl:   Allergies   Allergen Reactions    Penicillins Other (See Comments) and Rash     Fever, joint pain, dizzy         Post-op left TKA    The patient returns now post total knee arthroplasty. Their pain is diminishing and the wound healing. Their range of motion is improving. Radiographs: AP/Lateral and sunrise of the left knee taken in the office today reveal a good bone, prosthetic appearance. The patella is balanced. Radiographic Diagnosis:  Normal post-operative total knee. Recommendations:  Reviewed x-ray findings with the patient. They are to increase their weight bearing status as tolerated. A cane can be used in transition. They are to follow the routine rehabilitation protocol. They are to wean from their pain medications as tolerated.

## 2022-11-28 ENCOUNTER — APPOINTMENT (OUTPATIENT)
Dept: PHYSICAL THERAPY | Age: 77
End: 2022-11-28
Payer: MEDICARE

## 2022-11-30 ENCOUNTER — HOSPITAL ENCOUNTER (OUTPATIENT)
Dept: PHYSICAL THERAPY | Age: 77
Setting detail: RECURRING SERIES
Discharge: HOME | End: 2022-12-03
Payer: MEDICARE

## 2022-11-30 PROCEDURE — 97113 AQUATIC THERAPY/EXERCISES: CPT

## 2022-11-30 NOTE — PROGRESS NOTES
Annalisa Stack  : 1945  Primary: [employer] [place] [employer] [place] Hmo/p*  Secondary:  [address]  [facility] [address]  Phone: [phone]  Fax: [phone] Plan Frequency: 2-3 times/week  Plan of Care/Certification Expiration Date: 02/15/23      PT Visit Info:     Visit Count:  2   OUTPATIENT PHYSICAL THERAPY:OP NOTE TYPE: Treatment Note 2022       Episode  }Appt Desk             Treatment Diagnosis:  Pain in Left Knee (M25.562)  Stiffness of Left Knee, Not elsewhere classified (M25.662)  Other abnormalities of gait and mobility (R26.89)  Generalized Muscle Weakness (M62.81)    Goals: (Goals have been discussed and agreed upon with patient.)  Short-Term Functional Goals: Time Frame: 4 weeks  Pt to report compliance with HEP  Pt to restore full knee ext without pain  Pt to demonstrate normal gait level surfaces without AD  Discharge Goals: Time Frame: 12 weeks  Pt to increase strength for sit to stand x 30 reps without UE assist  Pt to increase strength for reciprocal gait on stairs with control  Pt to increase SLS > 10 sec B for safe amb all surfaces    Medical/Referring Diagnosis:  Status post left knee replacement [W29.816]  Referring Physician:  Cristo Staley MD MD Orders:  PT Eval and Treat   Date of Onset:  Onset Date: 10/20/22     Allergies:   Penicillins  Restrictions/Precautions:  Restrictions/Precautions: Weight Bearing  Left Lower Extremity Weight Bearing: Weight Bearing As Tolerated     Interventions Planned (Treatment may consist of any combination of the following):    Current Treatment Recommendations: Strengthening; ROM; Balance training; Transfer training; Gait training; Stair training; Pain management; Home exercise program; Patient/Caregiver education & training; Modalities; Aquatics     Subjective Comments: I don't know how to answer when you ask how I am doing. I don't really think I can answer that.   Initial:1/10 Post Session:  no increased pain with ex's  Medications Last Reviewed:  11/30/2022  Updated Objective Findings:      Observation, Palpation, and Special Tests   Mod swelling L knee  Steri-strips removed - incision clean, closed  Good patellar mobility   Stands with L knee flexed      Strength   Mild ext lag L        Functional Mobility, Balance, and Gait   Stairs - step-to pattern   SLS - R 3 sec, L 2 sec   Sit to stand - UE assist, weight on R primarily   Gait - antalgic with straight cane, decreased stride length and stance time L     Outcome Measure: Tool Used: Knee injury and Osteoarthritis Outcome Score for Joint Replacement (KOOS, JR)  Score:  Initial: 12 (Interval: 57.140) 11/17/2022 Most Recent: TBD   Interpretation of Score: The KOOS, JR contains 7 items from the original KOOS survey. Items are coded from 0 to 4, none to extreme respectively. Tanna Moran is scored by summing the raw response (range 0-28) and then converting it to an interval score using the table provided below. The interval score ranges from 0 to 100 where 0 represents total knee disability and 100 represents perfect knee health. Treatment   THERAPEUTIC ACTIVITY: ( see below for minutes): Therapeutic activities per grid below to improve mobility. Required moderate verbal and manual cues to improve functional mobility . THERAPEUTIC EXERCISE: (see below for minutes):  Exercises per grid below to improve strength. Required moderate verbal and manual cues to promote proper body alignment, promote proper body posture, promote proper body mechanics and promote proper body breathing techniques. Progressed resistance, range, repetitions and complexity of movement as indicated. MANUAL THERAPY: (see below for minutes): Joint mobilization and Soft tissue mobilization was utilized and necessary because of the patient's restricted joint motion, painful spasm, loss of articular motion and restricted motion of soft tissue. MODALITIES: (see below for minutes):      for pain modulation    AQUATIC THERAPY (see below for minutes): Aquatic treatment performed per flow grid for Decreased muscle strength, Decreased endurance, Decreased static/dynamic balance and reactive control, Decreased activity endurance, Decompression, Ease of movement and Low impact and reduced weight bearing activity. Date: 11/17/22 11/30/22  Visit 2      Modalities:                Manual Therapy:                Aquatics Activities:  60 mins      GAIT (F/B/S/M)  3 laps      SLR gait  3 laps      Hamstring Curl gait   3 laps      Rockettes  3 laps      Squats  X 10  On step      Calf raises  X 15  On step      Hamstring stretch B        Step ups ant B        SLS B  X 3                      Therapeutic Exercises: Not billable       Pt education, postural education, HEP, functional breathing        Quad set L        SLR L        LAQ L        Prone lying for ext ROM                HEP: see hand out       Treatment/Session Summary:    Treatment Assessment: Pt had good torie to [employer]. His balance was very challenged during laps. He improved by the end of the session. Communication/Consultation:  None today  Equipment provided today:  None  Recommendations/Intent for next treatment session: Next visit will focus on stretching, strengthening, modalities as indicated.     Total Treatment Billable Duration:  60 minutes  Time In: 1100  Time Out: 1200    Fausto Coronado, PT       Charge Capture  }Post Session Pain  PT Visit Info  [employer] Portal  MD Guidelines  Scanned Media  Benefits  MyChart    Future Appointments   Date Time Provider Mike Ayon   12/2/2022 11:00 AM Katie Ayala, PTA [facility]   12/6/2022  9:30 AM Elicia Sparrow, PTA [facility]   12/8/2022  9:30 AM Argentina Sparrow, PTA [facility]   12/13/2022 11:00 AM Argentina Sparrow, PTA [facility]   12/15/2022 11:00 AM Joseph Lawton, PT [facility]   12/20/2022 11:00 AM Argentina Sparrow, PTA [facility] 12/22/2022 11:00 AM Herman Restrepo, PT [facility]   12/27/2022  3:00 PM Argentina Sparrow, PTA [facility]   12/29/2022  3:00 PM Argentina Sparrow, PTA [facility]   1/19/2023  8:45 AM [facility]   1/26/2023  9:45 AM Tera Noel DO [facility]   4/26/2023  8:30 AM MD YURIY Ruiz [facility]

## 2022-12-02 ENCOUNTER — HOSPITAL ENCOUNTER (OUTPATIENT)
Dept: PHYSICAL THERAPY | Age: 77
Setting detail: RECURRING SERIES
Discharge: HOME | End: 2022-12-05
Payer: MEDICARE

## 2022-12-02 PROCEDURE — 97113 AQUATIC THERAPY/EXERCISES: CPT

## 2022-12-02 NOTE — PROGRESS NOTES
Catrachito Milian  : 1945  [place]*  Secondary:  [address]  [facility] [address]  Phone: [phone]  Fax: [phone] Plan Frequency: 2-3 times/week  Plan of Care/Certification Expiration Date: 02/15/23      PT Visit Info:     Visit Count:  3   OUTPATIENT PHYSICAL THERAPY:OP NOTE TYPE: Treatment Note 2022       Episode  }Appt Desk             Treatment Diagnosis:  Pain in Left Knee (M25.562)  Stiffness of Left Knee, Not elsewhere classified (M25.662)  Other abnormalities of gait and mobility (R26.89)  Generalized Muscle Weakness (M62.81)    Goals: (Goals have been discussed and agreed upon with patient.)  Short-Term Functional Goals: Time Frame: 4 weeks  Pt to report compliance with HEP  Pt to restore full knee ext without pain  Pt to demonstrate normal gait level surfaces without AD  Discharge Goals: Time Frame: 12 weeks  Pt to increase strength for sit to stand x 30 reps without UE assist  Pt to increase strength for reciprocal gait on stairs with control  Pt to increase SLS > 10 sec B for safe amb all surfaces    Medical/Referring Diagnosis:  Status post left knee replacement [O19.140]  Referring Physician:  Roxanne Dickens MD MD Orders:  PT Eval and Treat   Date of Onset:  Onset Date: 10/20/22     Allergies:   Penicillins  Restrictions/Precautions:  Restrictions/Precautions: Weight Bearing  Left Lower Extremity Weight Bearing: Weight Bearing As Tolerated     Interventions Planned (Treatment may consist of any combination of the following):    Current Treatment Recommendations: Strengthening; ROM; Balance training; Transfer training; Gait training; Stair training; Pain management; Home exercise program; Patient/Caregiver education & training; Modalities; Aquatics     Subjective Comments: I guess I am okay.   Initial:1/10                             Post Session:  no increased pain with ex's  Medications Last Reviewed:  12/2/2022  Updated Objective Findings:      Observation, Palpation, and Special Tests   Mod swelling L knee  Steri-strips removed - incision clean, closed  Good patellar mobility   Stands with L knee flexed      Strength   Mild ext lag L        Functional Mobility, Balance, and Gait   Stairs - step-to pattern   SLS - R 3 sec, L 2 sec   Sit to stand - UE assist, weight on R primarily   Gait - antalgic with straight cane, decreased stride length and stance time L     Outcome Measure: Tool Used: Knee injury and Osteoarthritis Outcome Score for Joint Replacement (KOOS, JR)  Score:  Initial: 12 (Interval: 57.140) 11/17/2022 Most Recent: TBD   Interpretation of Score: The KOOS, JR contains 7 items from the original KOOS survey. Items are coded from 0 to 4, none to extreme respectively. Bobo Horn is scored by summing the raw response (range 0-28) and then converting it to an interval score using the table provided below. The interval score ranges from 0 to 100 where 0 represents total knee disability and 100 represents perfect knee health. Treatment   THERAPEUTIC ACTIVITY: ( see below for minutes): Therapeutic activities per grid below to improve mobility. Required moderate verbal and manual cues to improve functional mobility . THERAPEUTIC EXERCISE: (see below for minutes):  Exercises per grid below to improve strength. Required moderate verbal and manual cues to promote proper body alignment, promote proper body posture, promote proper body mechanics and promote proper body breathing techniques. Progressed resistance, range, repetitions and complexity of movement as indicated. MANUAL THERAPY: (see below for minutes): Joint mobilization and Soft tissue mobilization was utilized and necessary because of the patient's restricted joint motion, painful spasm, loss of articular motion and restricted motion of soft tissue.    MODALITIES: (see below for minutes):      for pain modulation    AQUATIC THERAPY (see below for minutes): Aquatic treatment performed per flow grid for Decreased muscle strength, Decreased endurance, Decreased static/dynamic balance and reactive control, Decreased activity endurance, Decompression, Ease of movement and Low impact and reduced weight bearing activity. Date: 11/17/22 11/30/22  Visit 2 12/2/22 Visit 3     Modalities:                Manual Therapy:                Aquatics Activities:  60 mins 60 mins     GAIT (F/B/S/M)  3 laps 3 laps     SLR gait  3 laps 3 laps     Hamstring Curl gait   3 laps 3 laps     Rockettes  3 laps 3 laps     Squats  X 10  On step X 20 on step     Calf raises  X 15  On step X 20 on step     Hamstring stretch B        Step ups ant B        SLS B  X 3 X 3                     Therapeutic Exercises: Not billable       Pt education, postural education, HEP, functional breathing        Quad set L        SLR L        LAQ L        Prone lying for ext ROM                HEP: see hand out       Treatment/Session Summary:    Treatment Assessment: Again pt begins with balance challenged during laps. However, he walks with hands clasped and does not use rail until rockettes. Balance improved by end. Pt focused on activities. Occasional cues needed for quad engagement. Good effort and torie. Pt reports compliance with HEP. Communication/Consultation:  None today  Equipment provided today:  None  Recommendations/Intent for next treatment session: Next visit will focus on stretching, strengthening, modalities as indicated.     Total Treatment Billable Duration:  60 minutes  Time In: 1100  Time Out: 1200    Argentina Sparrow PTA       Charge Capture  }Post Session Pain  PT Visit Info  [employer] Portal  MD Guidelines  Scanned Media  Benefits  MyChart    Future Appointments   Date Time Provider Mike Ayon   12/6/2022  9:30 AM Dory Pierson PTA [facility]   12/8/2022  9:30 AM AYSE Hill   12/13/2022 11:00 AM AYSE [facility]OFEFR[facility] O 12/15/2022 11:00 AM Mariana Sauer, PT [facility]   12/20/2022 11:00 AM Argentina Sparrow, PTA [facility]   12/22/2022 11:00 [facility]   12/27/2022  3:00 PM Argentina Sparrow, PTA [facility]   12/29/2022  3:00 PM Argentina Sparrow, PTA [facility]   1/19/2023  8:45 AM [facility] RES[facility]   1/26/2023  9:45 AM Tera Noel DO [facility]   4/26/2023  8:30 AM MD YURIY Hill [facility]

## 2022-12-06 ENCOUNTER — HOSPITAL ENCOUNTER (OUTPATIENT)
Dept: PHYSICAL THERAPY | Age: 77
Setting detail: RECURRING SERIES
Discharge: HOME | End: 2022-12-09
Payer: MEDICARE

## 2022-12-06 PROCEDURE — 97016 VASOPNEUMATIC DEVICE THERAPY: CPT

## 2022-12-06 PROCEDURE — 97113 AQUATIC THERAPY/EXERCISES: CPT

## 2022-12-06 NOTE — PROGRESS NOTES
Cami Galvan  : 1945  Primary: [employer] H[employer]/p*  Secondary:  [address]  [facility] [address]  Phone: [phone]  Fax: [phone] Plan Frequency: 2-3 times/week  Plan of Care/Certification Expiration Date: 02/15/23      PT Visit Info:     Visit Count:  4   OUTPATIENT PHYSICAL THERAPY:OP NOTE TYPE: Treatment Note 2022       Episode  }Appt Desk             Treatment Diagnosis:  Pain in Left Knee (M25.562)  Stiffness of Left Knee, Not elsewhere classified (M25.662)  Other abnormalities of gait and mobility (R26.89)  Generalized Muscle Weakness (M62.81)    Goals: (Goals have been discussed and agreed upon with patient.)  Short-Term Functional Goals: Time Frame: 4 weeks  Pt to report compliance with HEP  Pt to restore full knee ext without pain  Pt to demonstrate normal gait level surfaces without AD  Discharge Goals: Time Frame: 12 weeks  Pt to increase strength for sit to stand x 30 reps without UE assist  Pt to increase strength for reciprocal gait on stairs with control  Pt to increase SLS > 10 sec B for safe amb all surfaces    Medical/Referring Diagnosis:  Status post left knee replacement [N40.844]  Referring Physician:  Eliot Lora MD MD Orders:  PT Eval and Treat   Date of Onset:  Onset Date: 10/20/22     Allergies:   Penicillins  Restrictions/Precautions:  Restrictions/Precautions: Weight Bearing  Left Lower Extremity Weight Bearing: Weight Bearing As Tolerated     Interventions Planned (Treatment may consist of any combination of the following):    Current Treatment Recommendations: Strengthening; ROM; Balance training; Transfer training; Gait training; Stair training; Pain management; Home exercise program; Patient/Caregiver education & training; Modalities; Aquatics     Subjective Comments: It is fine. I got dizzy for about 10 seconds this morning at the table. It was more light headedness.   Initial:1/10 Post Session:  no increased pain with ex's  Medications Last Reviewed:  12/6/2022  Updated Objective Findings:      Observation, Palpation, and Special Tests   Mod swelling L knee  Steri-strips removed - incision clean, closed  Good patellar mobility   Stands with L knee flexed      Strength   Mild ext lag L        Functional Mobility, Balance, and Gait   Stairs - step-to pattern   SLS - R 3 sec, L 2 sec   Sit to stand - UE assist, weight on R primarily   Gait - antalgic with straight cane, decreased stride length and stance time L     Outcome Measure: Tool Used: Knee injury and Osteoarthritis Outcome Score for Joint Replacement (KOOS, JR)  Score:  Initial: 12 (Interval: 57.140) 11/17/2022 Most Recent: TBD   Interpretation of Score: The KOOS, JR contains 7 items from the original KOOS survey. Items are coded from 0 to 4, none to extreme respectively. Jaleel Rodriguez is scored by summing the raw response (range 0-28) and then converting it to an interval score using the table provided below. The interval score ranges from 0 to 100 where 0 represents total knee disability and 100 represents perfect knee health. Treatment   THERAPEUTIC ACTIVITY: ( see below for minutes): Therapeutic activities per grid below to improve mobility. Required moderate verbal and manual cues to improve functional mobility . THERAPEUTIC EXERCISE: (see below for minutes):  Exercises per grid below to improve strength. Required moderate verbal and manual cues to promote proper body alignment, promote proper body posture, promote proper body mechanics and promote proper body breathing techniques. Progressed resistance, range, repetitions and complexity of movement as indicated. MANUAL THERAPY: (see below for minutes): Joint mobilization and Soft tissue mobilization was utilized and necessary because of the patient's restricted joint motion, painful spasm, loss of articular motion and restricted motion of soft tissue. MODALITIES: (see below for minutes):      for pain modulation    AQUATIC THERAPY (see below for minutes): Aquatic treatment performed per flow grid for Decreased muscle strength, Decreased endurance, Decreased static/dynamic balance and reactive control, Decreased activity endurance, Decompression, Ease of movement and Low impact and reduced weight bearing activity. Date: 11/17/22 11/30/22  Visit 2 12/2/22 Visit 3 12/6/22 Visit 4    Modalities:    15 mins    Game ready to L knee    supine    Manual Therapy:                Aquatics Activities:  60 mins 60 mins 60 mins    GAIT (F/B/S/M)  3 laps 3 laps 4 laps    SLR gait  3 laps 3 laps 4 laps    Hamstring Curl gait   3 laps 3 laps 4 laps    Rockettes  3 laps 3 laps 4 laps    Squats  X 10  On step X 20 on step X 20 on step    Calf raises  X 15  On step X 20 on step X 20 on step    Hamstring stretch B        Step ups ant B        SLS B  X 3 X 3 X 3                    Therapeutic Exercises: Not billable       Pt education, postural education, HEP, functional breathing        Quad set L        SLR L        LAQ L        Prone lying for ext ROM                HEP: see hand out       Treatment/Session Summary:    Treatment Assessment: Good torie and effort during today's ex's. Good response to increased laps. Improved balance without handrail observed at beginning of laps. Cues needed for squats - pt struggles to keep back relaxed and feet flat. Face turns red - pt holding breath with exertion. Worked on mechanics. Pt requested to use game ready at end of session. Communication/Consultation:  None today  Equipment provided today:  None  Recommendations/Intent for next treatment session: Next visit will focus on stretching, strengthening, modalities as indicated.     Total Treatment Billable Duration:  75 minutes  Time In: 0930  Time Out: 1045    Argentina Sparrow PTA       Charge Capture  }Post Session Pain  PT Visit Info  [facility] Portal  MD Guidelines  Scanned Media Benefits  MyChart    Future Appointments   Date Time Provider Mike Ayon   12/8/2022  9:30 AM Brenda Kunz, PTA [facility]   12/13/2022 11:00 AM Argentinalaurence Whelant, PTA [facility]   12/15/2022 11:00 AM Binh Nisreen, PT [facility]   12/20/2022 11:00 AM Argentina Whelant, PTA [facility]   12/22/2022 11:00 AM Binh Nielson, PT [facility]   12/27/2022  3:00 PM Argentina Whelant, PTA [facility]   12/29/2022  3:00 PM Argentinalaurence Whelant, PTA [facility]   1/19/2023  8:45 AM TRIM LAB RESOURCE TRIM [facility]   1/26/2023  9:45 AM Tera Noel DO TRIM [facility]   4/26/2023  8:30 AM MD DELTA MezaAI [facility]

## 2022-12-08 ENCOUNTER — HOSPITAL ENCOUNTER (OUTPATIENT)
Dept: PHYSICAL THERAPY | Age: 77
Setting detail: RECURRING SERIES
Discharge: HOME | End: 2022-12-11
Payer: MEDICARE

## 2022-12-08 PROCEDURE — 97113 AQUATIC THERAPY/EXERCISES: CPT

## 2022-12-08 NOTE — PROGRESS NOTES
Trupti Briceño  : 1945  Primary: [employer] Hmo/p*  Secondary:  [address]  [facility] [address]  Phone: [phone]  Fax: [phone] Plan Frequency: 2-3 times/week  Plan of Care/Certification Expiration Date: 02/15/23      PT Visit Info:     Visit Count:  5   OUTPATIENT PHYSICAL THERAPY:OP NOTE TYPE: Treatment Note 2022       Episode  }Appt Desk             Treatment Diagnosis:  Pain in Left Knee (M25.562)  Stiffness of Left Knee, Not elsewhere classified (M25.662)  Other abnormalities of gait and mobility (R26.89)  Generalized Muscle Weakness (M62.81)    Goals: (Goals have been discussed and agreed upon with patient.)  Short-Term Functional Goals: Time Frame: 4 weeks  Pt to report compliance with HEP  Pt to restore full knee ext without pain  Pt to demonstrate normal gait level surfaces without AD  Discharge Goals: Time Frame: 12 weeks  Pt to increase strength for sit to stand x 30 reps without UE assist  Pt to increase strength for reciprocal gait on stairs with control  Pt to increase SLS > 10 sec B for safe amb all surfaces    Medical/Referring Diagnosis:  Status post left knee replacement [R94.783]  Referring Physician:  Dre Azul MD MD Orders:  PT Eval and Treat   Date of Onset:  Onset Date: 10/20/22     Allergies:   Penicillins  Restrictions/Precautions:  Restrictions/Precautions: Weight Bearing  Left Lower Extremity Weight Bearing: Weight Bearing As Tolerated     Interventions Planned (Treatment may consist of any combination of the following):    Current Treatment Recommendations: Strengthening; ROM; Balance training; Transfer training; Gait training; Stair training; Pain management; Home exercise program; Patient/Caregiver education & training; Modalities; Aquatics     Subjective Comments: It's doing good. Still stays hot.   Initial:1/10                             Post Session:  no increased pain with ex's  Medications Last Reviewed:  12/8/2022  Updated Objective Findings:      Observation, Palpation, and Special Tests   Mod swelling L knee  Steri-strips removed - incision clean, closed  Good patellar mobility   Stands with L knee flexed      Strength   Mild ext lag L        Functional Mobility, Balance, and Gait   Stairs - step-to pattern   SLS - R 3 sec, L 2 sec   Sit to stand - UE assist, weight on R primarily   Gait - antalgic with straight cane, decreased stride length and stance time L     Outcome Measure: Tool Used: Knee injury and Osteoarthritis Outcome Score for Joint Replacement (KOOS, JR)  Score:  Initial: 12 (Interval: 57.140) 11/17/2022 Most Recent: TBD   Interpretation of Score: The KOOS, JR contains 7 items from the original KOOS survey. Items are coded from 0 to 4, none to extreme respectively. Jamil Gregory is scored by summing the raw response (range 0-28) and then converting it to an interval score using the table provided below. The interval score ranges from 0 to 100 where 0 represents total knee disability and 100 represents perfect knee health. Treatment   THERAPEUTIC ACTIVITY: ( see below for minutes): Therapeutic activities per grid below to improve mobility. Required moderate verbal and manual cues to improve functional mobility . THERAPEUTIC EXERCISE: (see below for minutes):  Exercises per grid below to improve strength. Required moderate verbal and manual cues to promote proper body alignment, promote proper body posture, promote proper body mechanics and promote proper body breathing techniques. Progressed resistance, range, repetitions and complexity of movement as indicated. MANUAL THERAPY: (see below for minutes): Joint mobilization and Soft tissue mobilization was utilized and necessary because of the patient's restricted joint motion, painful spasm, loss of articular motion and restricted motion of soft tissue.    MODALITIES: (see below for minutes):      for pain modulation    AQUATIC THERAPY (see below for minutes): Aquatic treatment performed per flow grid for Decreased muscle strength, Decreased endurance, Decreased static/dynamic balance and reactive control, Decreased activity endurance, Decompression, Ease of movement and Low impact and reduced weight bearing activity. Date: 12/2/22 Visit 3 12/6/22 Visit 4 12/8/22 Visit 5   Modalities:  15 mins    Game ready to L knee  supine    Manual Therapy:            Aquatics Activities: 60 mins 60 mins 60 mins   GAIT (F/B/S/M) 3 laps 4 laps 4 laps   SLR gait 3 laps 4 laps 4 laps   Hamstring Curl gait  3 laps 4 laps 4 laps   Rockettes 3 laps 4 laps 4 laps   Squats X 20 on step X 20 on step X 30 on step   Calf raises X 20 on step X 20 on step X 30 on step   Hamstring stretch B      Step ups ant B      SLS B X 3 X 3 X 3               Therapeutic Exercises:      Pt education, postural education, HEP, functional breathing      Quad set L      SLR L      LAQ L      Prone lying for ext ROM            HEP: see hand out       Treatment/Session Summary:    Treatment Assessment: Cues for mechanics during sit to stands/squats to keep feet flat and back relaxed. Good effort and torie. Pt compliant with suggestions from therapist. Game ready held due to no compatible pants. Communication/Consultation:  None today  Equipment provided today:  None  Recommendations/Intent for next treatment session: Next visit will focus on stretching, strengthening, modalities as indicated.     Total Treatment Billable Duration:  60 minutes  Time In: 0838  Time Out: 4532    Argentina Sparrow PTA       Charge Capture  }Post Session Pain  PT Visit Info  [employer] Portal  MD Guidelines  Scanned Media  Benefits  MyChart    Future Appointments   Date Time Provider Mike Ayon   12/13/2022 11:00 AM Maryann Pierce PTA [facility]   12/15/2022 11:00 AM Sina Pina PT [facility]   12/20/2022 11:00 AM AYSE Lopez [facility]   12/22/2022 11:00 AM Amrita Thomas Leighann, PT [facility]   12/27/2022  3:00 PM Argentina Sparrow, PTA [facility]   12/29/2022  3:00 PM Argentina Sparrow, PTA [facility]   1/19/2023  8:45 AM TRIM LAB RESOURCE TRIM [facility]   1/26/2023  9:45 AM Tera Noel DO TRIM [facility]   4/26/2023  8:30 AM MD YURIY Krishnan [facility]

## 2022-12-12 RX ORDER — ASPIRIN 81 MG/1
TABLET, COATED ORAL
Qty: 70 TABLET | Refills: 0 | OUTPATIENT
Start: 2022-12-12

## 2022-12-13 ENCOUNTER — HOSPITAL ENCOUNTER (OUTPATIENT)
Dept: PHYSICAL THERAPY | Age: 77
Setting detail: RECURRING SERIES
Discharge: HOME | End: 2022-12-16
Payer: MEDICARE

## 2022-12-13 PROCEDURE — 97113 AQUATIC THERAPY/EXERCISES: CPT

## 2022-12-13 NOTE — PROGRESS NOTES
Abby Gregory  : 1945  Primary: [facility] Medicare Hmo/p*  Secondary:  [address]  [facility] [address]  Phone: [phone]  Fax: [phone] Plan Frequency: 2-3 times/week  Plan of Care/Certification Expiration Date: 02/15/23      PT Visit Info:     Visit Count:  6   OUTPATIENT PHYSICAL THERAPY:OP NOTE TYPE: Treatment Note 2022       Episode  }Appt Desk             Treatment Diagnosis:  Pain in Left Knee (M25.562)  Stiffness of Left Knee, Not elsewhere classified (M25.662)  Other abnormalities of gait and mobility (R26.89)  Generalized Muscle Weakness (M62.81)    Goals: (Goals have been discussed and agreed upon with patient.)  Short-Term Functional Goals: Time Frame: 4 weeks  Pt to report compliance with HEP  Pt to restore full knee ext without pain  Pt to demonstrate normal gait level surfaces without AD  Discharge Goals: Time Frame: 12 weeks  Pt to increase strength for sit to stand x 30 reps without UE assist  Pt to increase strength for reciprocal gait on stairs with control  Pt to increase SLS > 10 sec B for safe amb all surfaces    Medical/Referring Diagnosis:  Status post left knee replacement [D60.206]  Referring Physician:  Sherri Savage MD MD Orders:  PT Eval and Treat   Date of Onset:  Onset Date: 10/20/22     Allergies:   Penicillins  Restrictions/Precautions:  Restrictions/Precautions: Weight Bearing  Left Lower Extremity Weight Bearing: Weight Bearing As Tolerated     Interventions Planned (Treatment may consist of any combination of the following):    Current Treatment Recommendations: Strengthening; ROM; Balance training; Transfer training; Gait training; Stair training; Pain management; Home exercise program; Patient/Caregiver education & training; Modalities; Aquatics     Subjective Comments: I don't know what to say.   Initial:1/10                             Post Session:  no increased pain with ex's  Medications Last Reviewed:  12/13/2022  Updated Objective Findings:      Observation, Palpation, and Special Tests   Mod swelling L knee  Steri-strips removed - incision clean, closed  Good patellar mobility   Stands with L knee flexed      Strength   Mild ext lag L        Functional Mobility, Balance, and Gait   Stairs - step-to pattern   SLS - R 3 sec, L 2 sec   Sit to stand - UE assist, weight on R primarily   Gait - antalgic with straight cane, decreased stride length and stance time L     Outcome Measure: Tool Used: Knee injury and Osteoarthritis Outcome Score for Joint Replacement (KOOS, JR)  Score:  Initial: 12 (Interval: 57.140) 11/17/2022 Most Recent: TBD   Interpretation of Score: The KOOS, JR contains 7 items from the original KOOS survey. Items are coded from 0 to 4, none to extreme respectively. Yosef Basurto is scored by summing the raw response (range 0-28) and then converting it to an interval score using the table provided below. The interval score ranges from 0 to 100 where 0 represents total knee disability and 100 represents perfect knee health. Treatment   THERAPEUTIC ACTIVITY: ( see below for minutes): Therapeutic activities per grid below to improve mobility. Required moderate verbal and manual cues to improve functional mobility . THERAPEUTIC EXERCISE: (see below for minutes):  Exercises per grid below to improve strength. Required moderate verbal and manual cues to promote proper body alignment, promote proper body posture, promote proper body mechanics and promote proper body breathing techniques. Progressed resistance, range, repetitions and complexity of movement as indicated. MANUAL THERAPY: (see below for minutes): Joint mobilization and Soft tissue mobilization was utilized and necessary because of the patient's restricted joint motion, painful spasm, loss of articular motion and restricted motion of soft tissue.    MODALITIES: (see below for minutes):      for pain modulation    AQUATIC THERAPY (see below for minutes): Aquatic treatment performed per flow grid for Decreased muscle strength, Decreased endurance, Decreased static/dynamic balance and reactive control, Decreased activity endurance, Decompression, Ease of movement and Low impact and reduced weight bearing activity. 12/6/22 Visit 5 12/13/22 Visit 6     Modalities:       Game ready to L knee       Manual Therapy:              Aquatics Activities: 60 mins 45 mins     GAIT (F/B/S/M) 4 laps 4 laps     SLR gait 4 laps 3 laps     Hamstring Curl gait  4 laps 3 laps     Rockettes 4 laps 3 laps     Squats X 30 on step X 20 on step     Calf raises X 30 on step X 30 on step     Hamstring stretch B       Step ups ant B       SLS B X 3 X 3                   Therapeutic Exercises:       Pt education, postural education, HEP, functional breathing       Quad set L       SLR L       LAQ L       Prone lying for ext ROM              HEP: see hand out       Treatment/Session Summary:    Treatment Assessment: Pt struggles with laps today from beginning of session. Recall and mechanics both difficult. Balance a challenge. Pt has challenges with memory affecting compliance with HEP outside of therapy. Holds breath and tightens back with squats. Reports no pain in L knee with ex's. Will resume prior volume of ex's at next visit if tolerated. Communication/Consultation:  None today  Equipment provided today:  None  Recommendations/Intent for next treatment session: Next visit will focus on stretching, strengthening, modalities as indicated.     Total Treatment Billable Duration:  45 minutes  Time In: 1100  Time Out: 300 [employer]       Charge Capture  }Post Session Pain  PT Visit Info  [employer] Portal  MD Guidelines  Scanned Media  Benefits  MyChart    Future Appointments   Date Time Provider Mike Ayon   12/15/2022 11:00 AM Selina Allen PT [facility]   12/20[facility]2022 11[facility]   12/22/2022 11:00 AM Joseph Huynh Leighann, PT [facility]   12/27/2022  3:00 PM Argentina Sparrow, PTA [facility]   12/29/2022  3:00 PM Argentina Sparrow, PTA [facility]   1/19/2023  8:45 AM TRIM LAB RESOURCE TRIM [facility]   1/26/2023  9:45 AM Tera Noel DO TRIM [facility]   4/26/2023  8:30 AM Isidoro Hall MD PO [facility]

## 2022-12-15 ENCOUNTER — HOSPITAL ENCOUNTER (OUTPATIENT)
Dept: PHYSICAL THERAPY | Age: 77
Setting detail: RECURRING SERIES
End: 2022-12-15
Payer: MEDICARE

## 2022-12-20 ENCOUNTER — HOSPITAL ENCOUNTER (OUTPATIENT)
Dept: PHYSICAL THERAPY | Age: 77
Setting detail: RECURRING SERIES
Discharge: HOME | End: 2022-12-23
Payer: MEDICARE

## 2022-12-20 PROCEDURE — 97113 AQUATIC THERAPY/EXERCISES: CPT

## 2022-12-20 NOTE — PLAN OF CARE
Dana Amarilis  : 1945  Primary: [employer] [place] [place] [employer] H[employer]/p*  Secondary:  [address]  [place] [address]  Phone: [phone]  Fax: [phone] Plan Frequency: 2-3 times/week  Plan of Care/Certification Expiration Date: 02/15/23      PT Visit Info:     Visit Count:  7   OUTPATIENT PHYSICAL THERAPY:OP NOTE TYPE: Treatment and Discharge Note 2022       Episode  }Appt Desk             Treatment Diagnosis:  Pain in Left Knee (M25.562)  Stiffness of Left Knee, Not elsewhere classified (M25.662)  Other abnormalities of gait and mobility (R26.89)  Generalized Muscle Weakness (M62.81)    Goals: (Goals have been discussed and agreed upon with patient.)  Short-Term Functional Goals: Time Frame: 4 weeks  Pt to report compliance with HEP - inconsistent  Pt to restore full knee ext without pain - MET  Pt to demonstrate normal gait level surfaces without AD - MET  Discharge Goals: Time Frame: 12 weeks  Pt to increase strength for sit to stand x 30 reps without UE assist - not met  Pt to increase strength for reciprocal gait on stairs with control - not met  Pt to increase SLS > 10 sec B for safe amb all surfaces - not met    Medical/Referring Diagnosis:  Status post left knee replacement [V96.681]  Referring Physician:  Beth Crenshaw MD MD Orders:  PT Eval and Treat   Date of Onset:  Onset Date: 10/20/22     Allergies:   Penicillins  Restrictions/Precautions:  Restrictions/Precautions: Weight Bearing  Left Lower Extremity Weight Bearing: Weight Bearing As Tolerated     Interventions Planned (Treatment may consist of any combination of the following):    Current Treatment Recommendations: Strengthening; ROM; Balance training; Transfer training; Gait training; Stair training; Pain management; Home exercise program; Patient/Caregiver education & training; Modalities;  Aquatics     Subjective Comments: I feel like I'm as good as I'm going to be.  Initial:1/10                             Post Session:  no increased pain with ex's  Medications Last Reviewed:  12/20/2022  Updated Objective Findings:  12/20/22 DC    Observation, Palpation, and Special Tests   Good patellar increments         Functional Mobility, Balance, and Gait   Stairs - reciprocal, lacks control   SLS - R 5 sec, L 3 sec   Sit to stand - uses momentum, holds breath   Gait - no AD     Outcome Measure: Tool Used: Knee injury and Osteoarthritis Outcome Score for Joint Replacement (KOOS, JR)  Score:  Initial: 12 (Interval: 57.140) 11/17/2022 Most Recent: 9 (interval: 63.776) 12/20/22   Interpretation of Score: The KOOS, JR contains 7 items from the original KOOS survey. Items are coded from 0 to 4, none to extreme respectively. Olga Gomez is scored by summing the raw response (range 0-28) and then converting it to an interval score using the table provided below. The interval score ranges from 0 to 100 where 0 represents total knee disability and 100 represents perfect knee health. Treatment   THERAPEUTIC ACTIVITY: ( see below for minutes): Therapeutic activities per grid below to improve mobility. Required moderate verbal and manual cues to improve functional mobility . THERAPEUTIC EXERCISE: (see below for minutes):  Exercises per grid below to improve strength. Required moderate verbal and manual cues to promote proper body alignment, promote proper body posture, promote proper body mechanics and promote proper body breathing techniques. Progressed resistance, range, repetitions and complexity of movement as indicated. MANUAL THERAPY: (see below for minutes): Joint mobilization and Soft tissue mobilization was utilized and necessary because of the patient's restricted joint motion, painful spasm, loss of articular motion and restricted motion of soft tissue. MODALITIES: (see below for minutes):      for pain modulation    AQUATIC THERAPY (see below for minutes):  Aquatic treatment performed per flow grid for Decreased muscle strength, Decreased endurance, Decreased static/dynamic balance and reactive control, Decreased activity endurance, Decompression, Ease of movement and Low impact and reduced weight bearing activity. 12/6/22 Visit 5 12/13/22 Visit 6 12/20/22 Visit 7    Modalities:       Game ready to L knee       Manual Therapy:              Aquatics Activities: 60 mins 45 mins 60 mins    GAIT (F/B/S/M) 4 laps 4 laps 4 laps    SLR gait 4 laps 3 laps 4 laps    Hamstring Curl gait  4 laps 3 laps 4 laps    Rockettes 4 laps 3 laps 4 laps    Squats X 30 on step X 20 on step X 30 on step    Calf raises X 30 on step X 30 on step X 30 on step    Hamstring stretch B       Step ups ant B       SLS B X 3 X 3 X 3                  Therapeutic Exercises:       Pt education, postural education, HEP, functional breathing       Quad set L       SLR L       LAQ L       Prone lying for ext ROM              HEP: see hand out       Treatment/Session Summary:    Treatment Assessment: Pt reports that he has returned to normal activity at home. Has no trouble with sleeping, house hold chores, and no pain. Improved KOOS score. Throughout treatment episode memory challenges affected pt compliance with POC/HEP. However pt consistently demonstrated good effort and worked hard during therapy. Pt would like to DC at this time. Discussed continuing practicing sit to stands for strengthening and single leg stance to address balance.      In: 11:00am  Out: 12:00pm    Bruna Sellers PTA       Charge Capture  }Post Session Pain  PT Visit Info  [facility] Portal  MD Guidelines  Scanned Media  Benefits  MyChart    Future Appointments   Date Time Provider Mike Ayon   12/20/2022 11:00 AM Bruna Sellers PTA [facility]   12/22/2022 11:00 AM Malika Hernandez PT [facility]   12/27/2022  3:00 PM Greg[place] Sparrow P[place] [facility]   12/29/2022  3:00 PM [place] Sparrow PTA [facility]   1/19/2023  8:45 AM [facility]   1/26/2023  9:45 AM Alvaro Godinez DO [facility]   4/26/2023  8:30 AM MD YURIY Wolff [facility]

## 2022-12-22 ENCOUNTER — APPOINTMENT (OUTPATIENT)
Dept: PHYSICAL THERAPY | Age: 77
End: 2022-12-22
Payer: MEDICARE

## 2022-12-27 ENCOUNTER — APPOINTMENT (OUTPATIENT)
Dept: PHYSICAL THERAPY | Age: 77
End: 2022-12-27
Payer: MEDICARE

## 2022-12-29 ENCOUNTER — APPOINTMENT (OUTPATIENT)
Dept: PHYSICAL THERAPY | Age: 77
End: 2022-12-29
Payer: MEDICARE

## 2023-01-09 ENCOUNTER — OFFICE VISIT (OUTPATIENT)
Dept: INTERNAL MEDICINE CLINIC | Facility: CLINIC | Age: 78
End: 2023-01-09
Payer: MEDICARE

## 2023-01-09 VITALS
OXYGEN SATURATION: 91 % | DIASTOLIC BLOOD PRESSURE: 90 MMHG | BODY MASS INDEX: 29.19 KG/M2 | WEIGHT: 186 LBS | SYSTOLIC BLOOD PRESSURE: 128 MMHG | HEART RATE: 83 BPM | HEIGHT: 67 IN

## 2023-01-09 DIAGNOSIS — J01.00 ACUTE NON-RECURRENT MAXILLARY SINUSITIS: Primary | ICD-10-CM

## 2023-01-09 PROCEDURE — 3074F SYST BP LT 130 MM HG: CPT | Performed by: NURSE PRACTITIONER

## 2023-01-09 PROCEDURE — 3080F DIAST BP >= 90 MM HG: CPT | Performed by: NURSE PRACTITIONER

## 2023-01-09 PROCEDURE — 1123F ACP DISCUSS/DSCN MKR DOCD: CPT | Performed by: NURSE PRACTITIONER

## 2023-01-09 PROCEDURE — 99213 OFFICE O/P EST LOW 20 MIN: CPT | Performed by: NURSE PRACTITIONER

## 2023-01-09 RX ORDER — DOXYCYCLINE HYCLATE 100 MG
100 TABLET ORAL 2 TIMES DAILY
Qty: 14 TABLET | Refills: 0 | Status: SHIPPED | OUTPATIENT
Start: 2023-01-09 | End: 2023-01-16

## 2023-01-09 RX ORDER — FLUTICASONE PROPIONATE 50 MCG
2 SPRAY, SUSPENSION (ML) NASAL DAILY
Qty: 16 G | Refills: 5 | Status: SHIPPED | OUTPATIENT
Start: 2023-01-09

## 2023-01-09 RX ORDER — BENZONATATE 100 MG/1
100-200 CAPSULE ORAL 3 TIMES DAILY PRN
Qty: 60 CAPSULE | Refills: 0 | Status: SHIPPED | OUTPATIENT
Start: 2023-01-09 | End: 2023-01-16

## 2023-01-09 ASSESSMENT — ENCOUNTER SYMPTOMS
HEMOPTYSIS: 0
WHEEZING: 0
COUGH: 1
SHORTNESS OF BREATH: 0
SORE THROAT: 1

## 2023-01-09 ASSESSMENT — PATIENT HEALTH QUESTIONNAIRE - PHQ9
SUM OF ALL RESPONSES TO PHQ QUESTIONS 1-9: 2
2. FEELING DOWN, DEPRESSED OR HOPELESS: 1
1. LITTLE INTEREST OR PLEASURE IN DOING THINGS: 1
SUM OF ALL RESPONSES TO PHQ QUESTIONS 1-9: 2
SUM OF ALL RESPONSES TO PHQ9 QUESTIONS 1 & 2: 2

## 2023-01-09 NOTE — PROGRESS NOTES
Carolann Roldan (:  1945) is a 76 y.o. male,Established patient, here for evaluation of the following chief complaint(s):  Cough         ASSESSMENT/PLAN:  1. Acute non-recurrent maxillary sinusitis    Return in about 2 weeks (around 2023) for ear irrigation. Patient with sinusitis  Start abx, nose spray  Use tessalon for cough  Hydrate well and rest  Use debrox to right ear and follow up for irrigations    Subjective   SUBJECTIVE/OBJECTIVE:  Patient is here for his hoarse voice. His voice is deeper than normal and it gets deeper when he is getting a cold. He has had a cough x 3 weeks and he has a lot of mucous. When he sleeps he rattles with mucous. He feels a little congested in the nose. Cough  This is a new problem. The current episode started 1 to 4 weeks ago. The cough is Productive of sputum. Associated symptoms include nasal congestion and a sore throat (scratchy, mildly sore). Pertinent negatives include no chills, ear congestion, ear pain, fever, headaches, hemoptysis, myalgias, postnasal drip, shortness of breath, sweats or wheezing. Treatments tried: nothing, vit c. There is no history of asthma or COPD. Review of Systems   Constitutional:  Negative for chills and fever. HENT:  Positive for sore throat (scratchy, mildly sore). Negative for ear pain and postnasal drip. Respiratory:  Positive for cough. Negative for hemoptysis, shortness of breath and wheezing. Musculoskeletal:  Negative for myalgias. Neurological:  Negative for headaches. Objective   Physical Exam  Vitals reviewed. Constitutional:       General: He is not in acute distress. Appearance: Normal appearance. He is not ill-appearing. HENT:      Head: Normocephalic. Right Ear: External ear normal.      Left Ear: External ear normal.      Ears:      Comments: Right ear cerumen impaction     Nose: Congestion present.       Mouth/Throat:      Mouth: Mucous membranes are moist. Pharynx: No oropharyngeal exudate. Eyes:      Extraocular Movements: Extraocular movements intact. Pupils: Pupils are equal, round, and reactive to light. Cardiovascular:      Rate and Rhythm: Normal rate and regular rhythm. Pulmonary:      Effort: Pulmonary effort is normal.      Breath sounds: Normal breath sounds. No wheezing. Musculoskeletal:      Cervical back: Neck supple. Neurological:      General: No focal deficit present. Mental Status: He is alert and oriented to person, place, and time. An electronic signature was used to authenticate this note.     --Amandeep Manuel, BEATA - CNP

## 2023-01-16 ENCOUNTER — TELEPHONE (OUTPATIENT)
Dept: ORTHOPEDIC SURGERY | Age: 78
End: 2023-01-16

## 2023-01-16 NOTE — TELEPHONE ENCOUNTER
He has a dental appt tomorrow just for a cleaning. He is presently on Doxycycline 100mg bid since last week for pneumonia. Would he still need to take Amoxicillin before his appt? Please let her know.

## 2023-01-24 ENCOUNTER — NURSE ONLY (OUTPATIENT)
Dept: INTERNAL MEDICINE CLINIC | Facility: CLINIC | Age: 78
End: 2023-01-24

## 2023-01-24 DIAGNOSIS — I10 ESSENTIAL HYPERTENSION, BENIGN: ICD-10-CM

## 2023-01-24 DIAGNOSIS — E78.2 MIXED HYPERLIPIDEMIA: ICD-10-CM

## 2023-01-24 LAB
ERYTHROCYTE [DISTWIDTH] IN BLOOD BY AUTOMATED COUNT: 13.1 % (ref 11.9–14.6)
HCT VFR BLD AUTO: 44.4 % (ref 41.1–50.3)
HGB BLD-MCNC: 14.1 G/DL (ref 13.6–17.2)
MCH RBC QN AUTO: 29.1 PG (ref 26.1–32.9)
MCHC RBC AUTO-ENTMCNC: 31.8 G/DL (ref 31.4–35)
MCV RBC AUTO: 91.7 FL (ref 82–102)
NRBC # BLD: 0 K/UL (ref 0–0.2)
PLATELET # BLD AUTO: 217 K/UL (ref 150–450)
PMV BLD AUTO: 10.8 FL (ref 9.4–12.3)
RBC # BLD AUTO: 4.84 M/UL (ref 4.23–5.6)
WBC # BLD AUTO: 8.9 K/UL (ref 4.3–11.1)

## 2023-01-25 LAB
ALBUMIN SERPL-MCNC: 3.4 G/DL (ref 3.2–4.6)
ALBUMIN/GLOB SERPL: 0.7 (ref 0.4–1.6)
ALP SERPL-CCNC: 97 U/L (ref 50–136)
ALT SERPL-CCNC: 26 U/L (ref 12–65)
ANION GAP SERPL CALC-SCNC: 5 MMOL/L (ref 2–11)
AST SERPL-CCNC: 24 U/L (ref 15–37)
BILIRUB SERPL-MCNC: 0.5 MG/DL (ref 0.2–1.1)
BUN SERPL-MCNC: 18 MG/DL (ref 8–23)
CALCIUM SERPL-MCNC: 8.8 MG/DL (ref 8.3–10.4)
CHLORIDE SERPL-SCNC: 106 MMOL/L (ref 101–110)
CHOLEST SERPL-MCNC: 155 MG/DL
CO2 SERPL-SCNC: 29 MMOL/L (ref 21–32)
CREAT SERPL-MCNC: 0.9 MG/DL (ref 0.8–1.5)
GLOBULIN SER CALC-MCNC: 4.7 G/DL (ref 2.8–4.5)
GLUCOSE SERPL-MCNC: 96 MG/DL (ref 65–100)
HDLC SERPL-MCNC: 61 MG/DL (ref 40–60)
HDLC SERPL: 2.5
LDLC SERPL CALC-MCNC: 83.6 MG/DL
POTASSIUM SERPL-SCNC: 4.4 MMOL/L (ref 3.5–5.1)
PROT SERPL-MCNC: 8.1 G/DL (ref 6.3–8.2)
SODIUM SERPL-SCNC: 140 MMOL/L (ref 133–143)
TRIGL SERPL-MCNC: 52 MG/DL (ref 35–150)
TSH, 3RD GENERATION: 2.14 UIU/ML (ref 0.36–3.74)
VLDLC SERPL CALC-MCNC: 10.4 MG/DL (ref 6–23)

## 2023-01-26 ENCOUNTER — OFFICE VISIT (OUTPATIENT)
Dept: INTERNAL MEDICINE CLINIC | Facility: CLINIC | Age: 78
End: 2023-01-26
Payer: MEDICARE

## 2023-01-26 VITALS
SYSTOLIC BLOOD PRESSURE: 110 MMHG | BODY MASS INDEX: 28.72 KG/M2 | WEIGHT: 183 LBS | HEIGHT: 67 IN | DIASTOLIC BLOOD PRESSURE: 70 MMHG | HEART RATE: 70 BPM | OXYGEN SATURATION: 91 %

## 2023-01-26 DIAGNOSIS — I10 ESSENTIAL HYPERTENSION, BENIGN: ICD-10-CM

## 2023-01-26 DIAGNOSIS — R13.19 ESOPHAGEAL DYSPHAGIA: Primary | ICD-10-CM

## 2023-01-26 DIAGNOSIS — F32.1 MAJOR DEPRESSIVE DISORDER, SINGLE EPISODE, MODERATE (HCC): ICD-10-CM

## 2023-01-26 DIAGNOSIS — S00.451A FOREIGN BODY OF EXTERNAL EAR, RIGHT, INITIAL ENCOUNTER: ICD-10-CM

## 2023-01-26 DIAGNOSIS — E21.3 HYPERPARATHYROIDISM, UNSPECIFIED (HCC): ICD-10-CM

## 2023-01-26 DIAGNOSIS — E78.2 MIXED HYPERLIPIDEMIA: ICD-10-CM

## 2023-01-26 DIAGNOSIS — F03.90 DEMENTIA WITHOUT BEHAVIORAL DISTURBANCE (HCC): ICD-10-CM

## 2023-01-26 DIAGNOSIS — Z00.00 MEDICARE ANNUAL WELLNESS VISIT, SUBSEQUENT: ICD-10-CM

## 2023-01-26 PROCEDURE — G0439 PPPS, SUBSEQ VISIT: HCPCS | Performed by: INTERNAL MEDICINE

## 2023-01-26 PROCEDURE — 3074F SYST BP LT 130 MM HG: CPT | Performed by: INTERNAL MEDICINE

## 2023-01-26 PROCEDURE — 1123F ACP DISCUSS/DSCN MKR DOCD: CPT | Performed by: INTERNAL MEDICINE

## 2023-01-26 PROCEDURE — 3078F DIAST BP <80 MM HG: CPT | Performed by: INTERNAL MEDICINE

## 2023-01-26 PROCEDURE — 99214 OFFICE O/P EST MOD 30 MIN: CPT | Performed by: INTERNAL MEDICINE

## 2023-01-26 PROCEDURE — 69200 CLEAR OUTER EAR CANAL: CPT | Performed by: INTERNAL MEDICINE

## 2023-01-26 RX ORDER — LISINOPRIL 5 MG/1
5 TABLET ORAL DAILY
Qty: 90 TABLET | Refills: 3 | Status: SHIPPED | OUTPATIENT
Start: 2023-01-26

## 2023-01-26 RX ORDER — PRAVASTATIN SODIUM 80 MG/1
80 TABLET ORAL DAILY
Qty: 90 TABLET | Refills: 3 | Status: SHIPPED | OUTPATIENT
Start: 2023-01-26

## 2023-01-26 RX ORDER — ESCITALOPRAM OXALATE 20 MG/1
20 TABLET ORAL DAILY
Qty: 90 TABLET | Refills: 1 | Status: SHIPPED | OUTPATIENT
Start: 2023-01-26

## 2023-01-26 ASSESSMENT — ENCOUNTER SYMPTOMS
WHEEZING: 0
DIARRHEA: 0
CONSTIPATION: 0
SINUS PAIN: 0
ABDOMINAL PAIN: 0
VOMITING: 0
SHORTNESS OF BREATH: 0
NAUSEA: 0

## 2023-01-26 ASSESSMENT — PATIENT HEALTH QUESTIONNAIRE - PHQ9
2. FEELING DOWN, DEPRESSED OR HOPELESS: 0
6. FEELING BAD ABOUT YOURSELF - OR THAT YOU ARE A FAILURE OR HAVE LET YOURSELF OR YOUR FAMILY DOWN: 1
SUM OF ALL RESPONSES TO PHQ QUESTIONS 1-9: 4
SUM OF ALL RESPONSES TO PHQ QUESTIONS 1-9: 4
10. IF YOU CHECKED OFF ANY PROBLEMS, HOW DIFFICULT HAVE THESE PROBLEMS MADE IT FOR YOU TO DO YOUR WORK, TAKE CARE OF THINGS AT HOME, OR GET ALONG WITH OTHER PEOPLE: 0
SUM OF ALL RESPONSES TO PHQ QUESTIONS 1-9: 4
3. TROUBLE FALLING OR STAYING ASLEEP: 3
4. FEELING TIRED OR HAVING LITTLE ENERGY: 0
1. LITTLE INTEREST OR PLEASURE IN DOING THINGS: 0
9. THOUGHTS THAT YOU WOULD BE BETTER OFF DEAD, OR OF HURTING YOURSELF: 0
SUM OF ALL RESPONSES TO PHQ QUESTIONS 1-9: 4
7. TROUBLE CONCENTRATING ON THINGS, SUCH AS READING THE NEWSPAPER OR WATCHING TELEVISION: 0
8. MOVING OR SPEAKING SO SLOWLY THAT OTHER PEOPLE COULD HAVE NOTICED. OR THE OPPOSITE, BEING SO FIGETY OR RESTLESS THAT YOU HAVE BEEN MOVING AROUND A LOT MORE THAN USUAL: 0
SUM OF ALL RESPONSES TO PHQ9 QUESTIONS 1 & 2: 0
5. POOR APPETITE OR OVEREATING: 0

## 2023-01-26 ASSESSMENT — LIFESTYLE VARIABLES
HOW MANY STANDARD DRINKS CONTAINING ALCOHOL DO YOU HAVE ON A TYPICAL DAY: 1 OR 2
HOW OFTEN DO YOU HAVE A DRINK CONTAINING ALCOHOL: 2-3 TIMES A WEEK

## 2023-01-26 NOTE — PROGRESS NOTES
Martina Patel was seen today for follow-up, other and medicare awv. Diagnoses and all orders for this visit:    Esophageal dysphagia  -     [facility]    Dementia without behavioral disturbance ([facility]    Major depressive disorder, single episode, moderate (HCC)  -     escitalopram (LEXAPRO) 20 MG tablet; Take 1 tablet by mouth daily    Hyperparathyroidism, unspecified ([facility]    Medicare annual wellness visit, subsequent    Foreign body of external ear, right, initial encounter  -     REMV EXT CANAL FOREIGN BODY    Mixed hyperlipidemia  -     pravastatin (PRAVACHOL) 80 MG tablet; Take 1 tablet by mouth daily    Essential hypertension, benign  -     lisinopril (PRINIVIL;ZESTRIL) 5 MG tablet;  Take 1 tablet by mouth daily        Aries Mcintosh is a 76 y.o. male    Chief Complaint   Patient presents with    Follow-up     Check up and review labs lipids,HTN    Other     Wants his ears checked    Medicare AWV   Choking food a lot  At times, I.e coffee came back up  x 2    Food-rice things get to feeling stuck,getting to be a problem  Trying lose weight,thyroid perfect  Nurse Only on 01/24/2023   Component Date Value Ref Range Status    Cholesterol, Total 01/24/2023 155  <200 MG/DL Final    Comment: Borderline High: 200-239 mg/dL  High: Greater than or equal to 240 mg/dL      Triglycerides 01/24/2023 52  35 - 150 MG/DL Final    Comment: Borderline High: 150-199 mg/dL, High: 200-499 mg/dL  Very High: Greater than or equal to 500 mg/dL      HDL 01/24/2023 61 (A)  40 - 60 MG/DL Final    LDL Calculated 01/24/2023 83.6  <100 MG/DL Final    Comment: Near Optimal: 100-129 mg/dL  Borderline High: 130-159, High: 160-189 mg/dL  Very High: Greater than or equal to 190 mg/dL      VLDL Cholesterol Calculated 01/24/2023 10.4  6.0 - 23.0 MG/DL Final    Chol/HDL Ratio 01/24/2023 2.5    Final    TSH, 3RD GENERATION 01/24/2023 2.140  0.358 - 3.740 uIU/mL Final    Sodium 01/24/2023 140  133 - 143 mmol/L Final    Potassium 01/24/2023 4.4  3.5 - 5.1 mmol/L Final    Chloride 01/24/2023 106  101 - 110 mmol/L Final    CO2 01/24/2023 29  21 - 32 mmol/L Final    Anion Gap 01/24/2023 5  2 - 11 mmol/L Final    Glucose 01/24/2023 96  65 - 100 mg/dL Final    BUN 01/24/2023 18  8 - 23 MG/DL Final    Creatinine 01/24/2023 0.90  0.8 - 1.5 MG/DL Final    Est, Glom Filt Rate 01/24/2023 >60  >60 ml/min/1.73m2 Final    Comment:   Pediatric calculator link: [email or web address] [email or web address]/kdoqi/gfr_[email or web address]    These results are not intended for use in patients <25years of age. eGFR results are calculated without a race factor using  the 2021 CKD-EPI equation. Careful clinical correlation is recommended, particularly when comparing to results calculated using previous equations. The CKD-EPI equation is less accurate in patients with extremes of muscle mass, extra-renal metabolism of creatinine, excessive creatine ingestion, or following therapy that affects renal tubular secretion.       Calcium 01/24/2023 8.8  8.3 - 10.4 MG/DL Final    Total Bilirubin 01/24/2023 0.5  0.2 - 1.1 MG/DL Final    ALT 01/24/2023 26  12 - 65 U/L Final    AST 01/24/2023 24  15 - 37 U/L Final    Alk Phosphatase 01/24/2023 97  50 - 136 U/L Final    Total Protein 01/24/2023 8.1  6.3 - 8.2 g/dL Final    Albumin 01/24/2023 3.4  3.2 - 4.6 g/dL Final    Globulin 01/24/2023 4.7 (A)  2.8 - 4.5 g/dL Final    Albumin/Globulin Ratio 01/24/2023 0.7  0.4 - 1.6   Final    WBC 01/24/2023 8.9  4.3 - 11.1 K/uL Final    RBC 01/24/2023 4.84  4.23 - 5.6 M/uL Final    Hemoglobin 01/24/2023 14.1  13.6 - 17.2 g/dL Final    Hematocrit 01/24/2023 44.4  41.1 - 50.3 % Final    MCV 01/24/2023 91.7  82 - 102 FL Final    MCH 01/24/2023 29.1  26.1 - 32.9 PG Final    MCHC 01/24/2023 31.8  31.4 - 35.0 g/dL Final    RDW 01/24/2023 13.1  11.9 - 14.6 % Final    Platelets 04/22/8279 217  150 - 450 K/uL Final    MPV 01/24/2023 10.8  9.4 - 12.3 FL Final    nRBC 01/24/2023 0.00  0.0 - 0.2 K/uL Final **Note: Absolute NRBC parameter is now reported with Hemogram**        Past Medical History:   Diagnosis Date    Actinic keratosis     Actinic keratosis     Arthritis     CAD (coronary artery disease)     CABG ,bovine valve    Cancer (HCC)     Cellulitis and abscess of leg, except foot     Disorders of magnesium metabolism     Essential hypertension, benign     Heart failure ([facility]     MI     High cholesterol     History of blood transfusion     History of prostate cancer     Hyperparathyroidism, unspecified ([facility]     managed with medication     Mixed hyperlipidemia     Neoplasm of uncertain behavior of skin     JUSTINA (obstructive sleep apnea)     uses CPAP    S/P AVR (aortic valve replacement)     Scabies     Wears hearing aid        Family History   Problem Relation Age of Onset    Heart Disease Father         CVD/athrosclerotic    Diabetes Mother         Social History     Socioeconomic History    Marital status:      Spouse name: Not on file    Number of children: Not on file    Years of education: Not on file    Highest education level: Not on file   Occupational History    Not on file   Tobacco Use    Smoking status: Never    Smokeless tobacco: Former    Tobacco comments:     Quit smokin pack per 3 weeks   Substance and Sexual Activity    Alcohol use: Not Currently    Drug use: No    Sexual activity: Not on file   Other Topics Concern    Not on file   Social History Narrative    Not on file     Social Determinants of Health     Financial Resource Strain: Not on file   Food Insecurity: Not on file   Transportation Needs: Not on file   Physical Activity: Inactive    Days of Exercise per Week: 0 days    Minutes of Exercise per Session: 0 min   Stress: Not on file   Social Connections: Not on file   Intimate Partner Violence: Not on file   Housing Stability: Not on file         Current Outpatient Medications:     escitalopram (LEXAPRO) 20 MG tablet, Take 1 tablet by mouth daily, Disp: 90 tablet, Rfl: 1    lisinopril (PRINIVIL;ZESTRIL) 5 MG tablet, Take 1 tablet by mouth daily, Disp: 90 tablet, Rfl: 3    pravastatin (PRAVACHOL) 80 MG tablet, Take 1 tablet by mouth daily, Disp: 90 tablet, Rfl: 3    fluticasone (FLONASE) 50 MCG/ACT nasal spray, 2 sprays by Each Nostril route daily, Disp: 16 g, Rfl: 5    carbamide peroxide (DEBROX) 6.5 % otic solution, Place 5 drops into the right ear 2 times daily, Disp: 15 mL, Rfl: 0    aspirin 81 MG EC tablet, Take 1 tablet by mouth 2 times daily, Disp: 70 tablet, Rfl: 0    B Complex-C-Folic Acid (STRESS B COMPLEX PO), Take 1 tablet by mouth daily, Disp: , Rfl:     Multiple Vitamin (MULTIVITAMIN ADULT PO), Take 1 tablet by mouth daily, Disp: , Rfl:     CPAP Machine MISC, Use as Instructed. CPAP 7cmh20, Disp: , Rfl:     calcium carbonate (OSCAL) 500 MG TABS tablet, Take 1,500 mg by mouth in the morning and 1,500 mg before bedtime. , Disp: , Rfl:     vitamin D 25 MCG (1000 UT) CAPS, Take 1,000 Units by mouth daily, Disp: , Rfl:     Allergies   Allergen Reactions    Penicillins Other (See Comments) and Rash     Fever, joint pain, dizzy           Review of Systems   Constitutional:  Negative for appetite change, chills, fatigue and fever. HENT:  Positive for ear pain and hearing loss. Negative for sinus pain. Respiratory:  Negative for shortness of breath and wheezing. Cardiovascular:  Negative for chest pain. Gastrointestinal:  Negative for abdominal pain, constipation, diarrhea, nausea and vomiting. Genitourinary:  Negative for dysuria and frequency. Musculoskeletal:  Negative for myalgias. Neurological:  Negative for dizziness. Psychiatric/Behavioral:  Negative for suicidal ideas. All other systems reviewed and are negative. Vitals:    01/26/23 0946   BP: 110/70   Pulse: 70   SpO2: 91%   Weight: 183 lb (83 kg)   Height: 5' 7\" (1.702 m)           Physical Exam  HENT:      Right Ear: Decreased hearing noted. There is impacted cerumen.  A foreign body is present. Left Ear: Decreased hearing noted. Ears:      Comments: Black wax with rubber appearing circular mass       Removed foreign   body  c sterile  --no bleeding or complications. Renae Gomez was seen today for follow-up, other and medicare awv. Diagnoses and all orders for this visit:    Esophageal dysphagia  -     [facility]    Dementia without behavioral disturbance ([facility]    Major depressive disorder, single episode, moderate (HCC)  -     escitalopram (LEXAPRO) 20 MG tablet; Take 1 tablet by mouth daily    Hyperparathyroidism, unspecified ([facility]    Medicare annual wellness visit, subsequent    Foreign body of external ear, right, initial encounter  -     REMV EXT CANAL FOREIGN BODY    Mixed hyperlipidemia  -     pravastatin (PRAVACHOL) 80 MG tablet; Take 1 tablet by mouth daily    Essential hypertension, benign  -     lisinopril (PRINIVIL;ZESTRIL) 5 MG tablet; Take 1 tablet by mouth daily               Harpal Watson, DO  Medicare Annual Wellness Visit    Avinash Thakkar is here for Follow-up (Check up and review labs lipids,HTN), Other (Wants his ears checked), and Medicare AWV    Assessment & Plan   Esophageal dysphagia  -     [facility]  Dementia without behavioral disturbance (HCC)  Major depressive disorder, single episode, moderate (HCC)  -     escitalopram (LEXAPRO) 20 MG tablet; Take 1 tablet by mouth daily, Disp-90 tablet, R-1Normal  Hyperparathyroidism, unspecified ([facility]  Medicare annual wellness visit, subsequent  Foreign body of external ear, right, initial encounter  -     REMV EXT CANAL FOREIGN BODY  Mixed hyperlipidemia  -     pravastatin (PRAVACHOL) 80 MG tablet; Take 1 tablet by mouth daily, Disp-90 tablet, R-3Normal  Essential hypertension, benign  -     lisinopril (PRINIVIL;ZESTRIL) 5 MG tablet;  Take 1 tablet by mouth daily, Disp-90 tablet, R-3Normal      Recommendations for Preventive Services Due: see orders and patient instructions/AVS.  Recommended screening schedule for the next 5-10 years is provided to the patient in written form: see Patient Instructions/AVS.     Return for Medicare Annual Wellness Visit in 1 year. Subjective   The following acute and/or chronic problems were also addressed today:  Boris was seen today for follow-up, other and medicare awv. Diagnoses and all orders for this visit:    Esophageal dysphagia  -     [facility]    [facility]    Major depressive disorder, single episode, moderate (HCC)  -     escitalopram (LEXAPRO) 20 MG tablet; Take 1 tablet by mouth daily    Hyperparathyroidism, unspecified ([facility]    Medicare annual wellness visit, subsequent    Foreign body of external ear, right, initial encounter  -     REMV EXT CANAL FOREIGN BODY    Mixed hyperlipidemia  -     pravastatin (PRAVACHOL) 80 MG tablet; Take 1 tablet by mouth daily    Essential hypertension, benign  -     lisinopril (PRINIVIL;ZESTRIL) 5 MG tablet; Take 1 tablet by mouth daily        Patient's complete Health Risk Assessment and screening values have been reviewed and are found in Flowsheets. The following problems were reviewed today and where indicated follow up appointments were made and/or referrals ordered.     Positive Risk Factor Screenings with Interventions:    Fall Risk:  Do you feel unsteady or are you worried about falling? : (!) yes  Fall with injury in past year?: (!) yes     Interventions:    Patient declines any further evaluation or treatment              Weight and Activity:  Physical Activity: Inactive    Days of Exercise per Week: 0 days    Minutes of Exercise per Session: 0 min     On average, how many days per week do you engage in moderate to strenuous exercise (like a brisk walk)?: 0 days  Have you lost any weight without trying in the past 3 months?: (!) Yes  Body mass index: (!) 28.66      Inactivity Interventions:  Patient declined any further interventions or treatment    Unintentional Weight Loss Interventions:  Patient declined any further interventions or treatment       Hearing Screen:  Do you or your family notice any trouble with your hearing that hasn't been managed with hearing aids?: (!) Yes    Interventions:  Patient declines any further evaluation or treatment                       Objective   Vitals:    01/26/23 0946   BP: 110/70   Pulse: 70   SpO2: 91%   Weight: 183 lb (83 kg)   Height: 5' 7\" (1.702 m)      Body mass index is 28.66 kg/m². Allergies   Allergen Reactions    Penicillins Other (See Comments) and Rash     Fever, joint pain, dizzy       Prior to Visit Medications    Medication Sig Taking? Authorizing Provider   escitalopram (LEXAPRO) 20 MG tablet Take 1 tablet by mouth daily Yes Tera Noel DO   lisinopril (PRINIVIL;ZESTRIL) 5 MG tablet Take 1 tablet by mouth daily Yes Tera Noel DO   pravastatin (PRAVACHOL) 80 MG tablet Take 1 tablet by mouth daily Yes Tera Noel DO   fluticasone (FLONASE) 50 MCG/ACT nasal spray 2 sprays by Each Nostril route daily Yes BEATA Peralta CNP   carbamide peroxide (DEBROX) 6.5 % otic solution Place 5 drops into the right ear 2 times daily Yes BEATA Peralta CNP   aspirin 81 MG EC tablet Take 1 tablet by mouth 2 times daily Yes PELON Tubbs   B Complex-C-Folic Acid (STRESS B COMPLEX PO) Take 1 tablet by mouth daily Yes Historical Provider, MD   Multiple Vitamin (MULTIVITAMIN ADULT PO) Take 1 tablet by mouth daily Yes Historical Provider, MD   CPAP Machine MISC Use as Instructed. CPAP 7cmh20 Yes Historical Provider, MD   calcium carbonate (OSCAL) 500 MG TABS tablet Take 1,500 mg by mouth in the morning and 1,500 mg before bedtime.  Yes Historical Provider, MD   vitamin D 25 MCG (1000 UT) CAPS Take 1,000 Units by mouth daily Yes Ar Automatic Reconciliation       CareTeam (Including outside providers/suppliers regularly involved in providing care): Patient Care Team:  Yu Donald DO as PCP - General  Yu Donald DO as PCP - [facility] Empaneled Provider     Reviewed and updated this visit:  Allergies  Meds  Problems

## 2023-01-26 NOTE — PATIENT INSTRUCTIONS
Preventing Falls: Care Instructions  Overview     Getting around your home safely can be a challenge if you have injuries or health problems that make it easy for you to fall. Loose rugs and furniture in walkways are among the dangers for many older people who have problems walking or who have poor eyesight. People who have conditions such as arthritis, osteoporosis, or dementia also have to be careful not to fall. You can make your home safer with a few simple measures. Follow-up care is a key part of your treatment and safety. Be sure to make and go to all appointments, and call your doctor if you are having problems. It's also a good idea to know your test results and keep a list of the medicines you take. How can you care for yourself at home? Taking care of yourself  Exercise regularly to improve your strength, muscle tone, and balance. Walk if you can. Swimming may be a good choice if you cannot walk easily. Have your vision and hearing checked each year or any time you notice a change. If you have trouble seeing and hearing, you might not be able to avoid objects and could lose your balance. Know the side effects of the medicines you take. Ask your doctor or pharmacist whether the medicines you take can affect your balance. Sleeping pills or sedatives can affect your balance. Limit the amount of alcohol you drink. Alcohol can impair your balance and other senses. Ask your doctor whether calluses or corns on your feet need to be removed. If you wear loose-fitting shoes because of calluses or corns, you can lose your balance and fall. Talk to your doctor if you have numbness in your feet. You may get dizzy if you do not drink enough water. To prevent dehydration, drink plenty of fluids. Choose water and other clear liquids. If you have kidney, heart, or liver disease and have to limit fluids, talk with your doctor before you increase the amount of fluids you drink.   Preventing falls at home  Remove raised doorway thresholds, throw rugs, and clutter. Repair loose carpet or raised areas in the floor. Move furniture and electrical cords to keep them out of walking paths. Use nonskid floor wax, and wipe up spills right away, especially on ceramic tile floors. If you use a walker or cane, put rubber tips on it. If you use crutches, clean the bottoms of them regularly with an abrasive pad, such as steel wool. Keep your house well lit, especially stairways, porches, and outside walkways. Use night-lights in areas such as hallways and bathrooms. Add extra light switches or use remote switches (such as switches that go on or off when you clap your hands) to make it easier to turn lights on if you have to get up during the night. Install sturdy handrails on stairways. Move items in your cabinets so that the things you use a lot are on the lower shelves (about waist level). Keep a cordless phone and a flashlight with new batteries by your bed. If possible, put a phone in each of the main rooms of your house, or carry a cell phone in case you fall and cannot reach a phone. Or, you can wear a device around your neck or wrist. You push a button that sends a signal for help. Wear low-heeled shoes that fit well and give your feet good support. Use footwear with nonskid soles. Check the heels and soles of your shoes for wear. Repair or replace worn heels or soles. Do not wear socks without shoes on smooth floors, such as wood. Walk on the grass when the sidewalks are slippery. If you live in an area that gets snow and ice in the winter, sprinkle salt on slippery steps and sidewalks. Or ask a family member or friend to do this for you. Preventing falls in the bath  Install grab bars and nonskid mats inside and outside your shower or tub and near the toilet and sinks. Use shower chairs and bath benches. Use a hand-held shower head that will allow you to sit while showering.   Get into a tub or shower by putting the weaker leg in first. Get out of a tub or shower with your strong side first.  Repair loose toilet seats and consider installing a raised toilet seat to make getting on and off the toilet easier. Keep your bathroom door unlocked while you are in the shower. Where can you learn more? Go to http://www.cespedes.com/ and enter G117 to learn more about \"Preventing Falls: Care Instructions. \"  Current as of: May 4, 2022               Content Version: 13.5  © 1529-3991 Healthwise, [employer]. Care instructions adapted under license by [facility] ([facility]). If you have questions about a medical condition or this instruction, always ask your healthcare professional. [address] any warranty or liability for your use of this information. Learning About Being Active as an Older Adult  Why is being active important as you get older? Being active is one of the best things you can do for your health. And it's never too late to start. Being active--or getting active, if you aren't already--has definite benefits. It can:  Give you more energy,  Keep your mind sharp. Improve balance to reduce your risk of falls. Help you manage chronic illness with fewer medicines. No matter how old you are, how fit you are, or what health problems you have, there is a form of activity that will work for you. And the more physical activity you can do, the better your overall health will be. What kinds of activity can help you stay healthy? Being more active will make your daily activities easier. Physical activity includes planned exercise and things you do in daily life. There are four types of activity:  Aerobic. Doing aerobic activity makes your heart and lungs strong. Includes walking, dancing, and gardening. Aim for at least 2½ hours spread throughout the week. It improves your energy and can help you sleep better. Muscle-strengthening.   This type of activity can help maintain muscle and strengthen bones. Includes climbing stairs, using resistance bands, and lifting or carrying heavy loads. Aim for at least twice a week. It can help protect the knees and other joints. Stretching. Stretching gives you better range of motion in joints and muscles. Includes upper arm stretches, calf stretches, and gentle yoga. Aim for at least twice a week, preferably after your muscles are warmed up from other activities. It can help you function better in daily life. Balancing. This helps you stay coordinated and have good posture. Includes heel-to-toe walking, donna chi, and certain types of yoga. Aim for at least 3 days a week. It can reduce your risk of falling. Even if you have a hard time meeting the recommendations, it's better to be more active than less active. All activity done in each category counts toward your weekly total. You'd be surprised how daily things like carrying groceries, keeping up with grandchildren, and taking the stairs can add up. What keeps you from being active? If you've had a hard time being more active, you're not alone. Maybe you remember being able to do more. Or maybe you've never thought of yourself as being active. It's frustrating when you can't do the things you want. Being more active can help. What's holding you back? Getting started. Have a goal, but break it into easy tasks. Small steps build into big accomplishments. Staying motivated. If you feel like skipping your activity, remember your goal. Maybe you want to move better and stay independent. Every activity gets you one step closer. Not feeling your best.  Start with 5 minutes of an activity you enjoy. Prove to yourself you can do it. As you get comfortable, increase your time. You may not be where you want to be. But you're in the process of getting there. Everyone starts somewhere. How can you find safe ways to stay active?   Talk with your doctor about any physical challenges you're facing. Make a plan with your doctor if you have a health problem or aren't sure how to get started with activity. If you're already active, ask your doctor if there is anything you should change to stay safe as your body and health change. If you tend to feel dizzy after you take medicine, avoid activity at that time. Try being active before you take your medicine. This will reduce your risk of falls. If you plan to be active at home, make sure to clear your space before you get started. Remove things like TV cords, coffee tables, and throw rugs. It's safest to have plenty of space to move freely. The key to getting more active is to take it slow and steady. Try to improve only a little bit at a time. Pick just one area to improve on at first. And if an activity hurts, stop and talk to your doctor. Where can you learn more? Go to http://[employer].cespedes.com/ and enter P600 to learn more about \"Learning About Being Active as an Older Adult. \"  Current as of: October 10, 2022               Content Version: 13.5  © 1219-9091 Healthwise, [employer]. Care instructions adapted under license by [facility] ([facility]). If you have questions about a medical condition or this instruction, always ask your healthcare professional. [address] any warranty or liability for your use of this information. Hearing Loss: Care Instructions  Overview     Hearing loss is a sudden or slow decrease in how well you hear. It can range from mild to severe. Permanent hearing loss can occur with aging. It also can happen when you are exposed long-term to loud noise. Examples include listening to loud music, riding motorcycles, or being around other loud machines. Hearing loss can affect your work and home life. It can make you feel lonely or depressed. You may feel that you have lost your independence. But hearing aids and other devices can help you hear better and feel connected to others.   Follow-up care is a key part of your treatment and safety. Be sure to make and go to all appointments, and call your doctor if you are having problems. It's also a good idea to know your test results and keep a list of the medicines you take. How can you care for yourself at home? Avoid loud noises whenever possible. This helps keep your hearing from getting worse. Always wear hearing protection around loud noises. Wear a hearing aid as directed. See a professional who can help you pick a hearing aid that fits you. Have hearing tests as your doctor suggests. They can show whether your hearing has changed. Your hearing aid may need to be adjusted. Use other devices as needed. These may include:  Telephone amplifiers and hearing aids that can connect to a television, stereo, radio, or microphone. Devices that use lights or vibrations. These alert you to the doorbell, a ringing telephone, or a baby monitor. Television closed-captioning. This shows the words at the bottom of the screen. Most new TVs can do this. TTY (text telephone). This lets you type messages back and forth on the telephone instead of talking or listening. These devices are also called TDD. When messages are typed on the keyboard, they are sent over the phone line to a receiving TTY. The message is shown on a monitor. Use text messaging, social media, and email if it is hard for you to communicate by telephone. Try to learn a listening technique called speechreading. It is not lipreading. You pay attention to people's gestures, expressions, posture, and tone of voice. These clues can help you understand what a person is saying. Face the person you are talking to, and have them face you. Make sure the lighting is good. You need to see the other person's face clearly. Think about counseling if you need help to adjust to your hearing loss. When should you call for help?   Watch closely for changes in your health, and be sure to contact your doctor if:    You think your hearing is getting worse.     You have new symptoms, such as dizziness or nausea. Where can you learn more? Go to http://www.cespedes.com/ and enter R798 to learn more about \"Hearing Loss: Care Instructions. \"  Current as of: May 4, 2022               Content Version: 13.5  © 2006-2022 [employer]. Care instructions adapted under license by [facility] ([facility]). If you have questions about a medical condition or this instruction, always ask your healthcare professional. [place] [address] any warranty or liability for your use of this information. Advance Directives: Care Instructions  Overview  An advance directive is a legal way to state your wishes at the end of your life. It tells your family and your doctor what to do if you can't say what you want. There are two main types of advance directives. You can change them any time your wishes change. Living will. This form tells your family and your doctor your wishes about life support and other treatment. The form is also called a declaration. Medical power of . This form lets you name a person to make treatment decisions for you when you can't speak for yourself. This person is called a health care agent (health care proxy, health care surrogate). The form is also called a durable power of  for health care. If you do not have an advance directive, decisions about your medical care may be made by a family member, or by a doctor or a  who doesn't know you. It may help to think of an advance directive as a gift to the people who care for you. If you have one, they won't have to make tough decisions by themselves. For more information, including forms for your state, see the [address] website (www.caringinfo.org/planning/advance-directives/). Follow-up care is a key part of your treatment and safety.  Be sure to make and go to all appointments, and call your doctor if you are having problems. It's also a good idea to know your test results and keep a list of the medicines you take. What should you include in an advance directive? Many states have a unique advance directive form. (It may ask you to address specific issues.) Or you might use a universal form that's approved by many states. If your form doesn't tell you what to address, it may be hard to know what to include in your advance directive. Use the questions below to help you get started. Who do you want to make decisions about your medical care if you are not able to? What life-support measures do you want if you have a serious illness that gets worse over time or can't be cured? What are you most afraid of that might happen? (Maybe you're afraid of having pain, losing your independence, or being kept alive by machines.)  Where would you prefer to die? (Your home? A hospital? A nursing home?)  Do you want to donate your organs when you die? Do you want certain [employer] practices performed before you die? When should you call for help? Be sure to contact your doctor if you have any questions. Where can you learn more? Go to http://www.cespedes.com/ and enter R264 to learn more about \"Advance Directives: Care Instructions. \"  Current as of: June 16, 2022               Content Version: 13.5  © 5124-2889 Healthwise, Incorporated. Care instructions adapted under license by [facility] ([facility]). If you have questions about a medical condition or this instruction, always ask your healthcare professional. [place] [address] any warranty or liability for your use of this information. Personalized Preventive Plan for Lucio Bedolla - 1/26/2023  Medicare offers a range of preventive health benefits. Some of the tests and screenings are paid in full while other may be subject to a deductible, co-insurance, and/or copay.     Some of these benefits include a comprehensive review of your medical history including lifestyle, illnesses that may run in your family, and various assessments and screenings as appropriate. After reviewing your medical record and screening and assessments performed today your provider may have ordered immunizations, labs, imaging, and/or referrals for you. A list of these orders (if applicable) as well as your Preventive Care list are included within your After Visit Summary for your review. Other Preventive Recommendations:    A preventive eye exam performed by an eye specialist is recommended every 1-2 years to screen for glaucoma; cataracts, macular degeneration, and other eye disorders. A preventive dental visit is recommended every 6 months. Try to get at least 150 minutes of exercise per week or 10,000 steps per day on a pedometer . Order or download the FREE \"Exercise & Physical Activity: Your Everyday Guide\" from [employer]. Call 5-651.102.4406 or search [employer] online. You need 9367-4383 mg of calcium and 0130-5627 IU of vitamin D per day. It is possible to meet your calcium requirement with diet alone, but a vitamin D supplement is usually necessary to meet this goal.  When exposed to the sun, use a sunscreen that protects against both UVA and UVB radiation with an SPF of 30 or greater. Reapply every 2 to 3 hours or after sweating, drying off with a towel, or swimming. Always wear a seat belt when traveling in a car. Always wear a helmet when riding a bicycle or motorcycle.

## 2023-04-26 ENCOUNTER — OFFICE VISIT (OUTPATIENT)
Dept: ORTHOPEDIC SURGERY | Age: 78
End: 2023-04-26
Payer: MEDICARE

## 2023-04-26 DIAGNOSIS — Z96.652 STATUS POST LEFT KNEE REPLACEMENT: Primary | ICD-10-CM

## 2023-04-26 PROCEDURE — 1123F ACP DISCUSS/DSCN MKR DOCD: CPT | Performed by: ORTHOPAEDIC SURGERY

## 2023-04-26 PROCEDURE — 99213 OFFICE O/P EST LOW 20 MIN: CPT | Performed by: ORTHOPAEDIC SURGERY

## 2023-04-26 NOTE — PROGRESS NOTES
Name: Jacquenette Ganser  YOB: 1945  Gender: male  MRN: [id number]      Current Outpatient Medications:     escitalopram (LEXAPRO) 20 MG tablet, Take 1 tablet by mouth daily, Disp: 90 tablet, Rfl: 1    lisinopril (PRINIVIL;ZESTRIL) 5 MG tablet, Take 1 tablet by mouth daily, Disp: 90 tablet, Rfl: 3    pravastatin (PRAVACHOL) 80 MG tablet, Take 1 tablet by mouth daily, Disp: 90 tablet, Rfl: 3    fluticasone (FLONASE) 50 MCG/ACT nasal spray, 2 sprays by Each Nostril route daily, Disp: 16 g, Rfl: 5    aspirin 81 MG EC tablet, Take 1 tablet by mouth 2 times daily, Disp: 70 tablet, Rfl: 0    B Complex-C-Folic Acid (STRESS B COMPLEX PO), Take 1 tablet by mouth daily, Disp: , Rfl:     Multiple Vitamin (MULTIVITAMIN ADULT PO), Take 1 tablet by mouth daily, Disp: , Rfl:     CPAP Machine MISC, Use as Instructed. CPAP 7cmh20, Disp: , Rfl:     calcium carbonate (OSCAL) 500 MG TABS tablet, Take 1,500 mg by mouth in the morning and 1,500 mg before bedtime. , Disp: , Rfl:     vitamin D 25 MCG (1000 UT) CAPS, Take 1,000 Units by mouth daily, Disp: , Rfl:   Allergies   Allergen Reactions    Penicillins Other (See Comments) and Rash     Fever, joint pain, dizzy         CC: Post-op left TKA    HPI: The patient is here now 6 months post left TS total knee arthroplasty. They report that they are functioning well and denies knee pain, swelling, or instability. They report occasional soreness. No other new complaints. PMH: Reviewed and unchanged    ROS:  As per HPI; pertinent positives and negatives are addressed with the patient, particularly, those related to musculoskeletal concerns. Non-orthopaedic concerns were referred back to the primary care physician. PE:  left Knee  Patient is comfortable and in no distress.   ROM: 0 to 1250degrees  AP translation: 4 mm  M-L laxity: 2 degrees  Alignment: 4 degrees of valgus  Quadriceps strength: excellent  Gait: no limp  Incision: well healed    Radiographs: AP/Lateral and

## 2023-07-03 RX ORDER — FLUTICASONE PROPIONATE 50 MCG
SPRAY, SUSPENSION (ML) NASAL
Qty: 48 G | Refills: 1 | Status: SHIPPED | OUTPATIENT
Start: 2023-07-03

## 2023-07-27 ENCOUNTER — NURSE ONLY (OUTPATIENT)
Dept: INTERNAL MEDICINE CLINIC | Facility: CLINIC | Age: 78
End: 2023-07-27

## 2023-07-27 DIAGNOSIS — R73.09 ELEVATED HEMOGLOBIN A1C MEASUREMENT: ICD-10-CM

## 2023-07-27 DIAGNOSIS — E21.3 HYPERPARATHYROIDISM, UNSPECIFIED (HCC): ICD-10-CM

## 2023-07-27 DIAGNOSIS — E78.2 MIXED HYPERLIPIDEMIA: ICD-10-CM

## 2023-07-27 DIAGNOSIS — I10 ESSENTIAL HYPERTENSION, BENIGN: ICD-10-CM

## 2023-07-27 DIAGNOSIS — E21.3 HYPERPARATHYROIDISM, UNSPECIFIED (HCC): Primary | ICD-10-CM

## 2023-07-27 LAB
ALBUMIN SERPL-MCNC: 3.9 G/DL (ref 3.2–4.6)
ALBUMIN/GLOB SERPL: 1.1 (ref 0.4–1.6)
ALP SERPL-CCNC: 84 U/L (ref 50–136)
ALT SERPL-CCNC: 28 U/L (ref 12–65)
ANION GAP SERPL CALC-SCNC: 5 MMOL/L (ref 2–11)
AST SERPL-CCNC: 24 U/L (ref 15–37)
BILIRUB SERPL-MCNC: 0.5 MG/DL (ref 0.2–1.1)
BUN SERPL-MCNC: 16 MG/DL (ref 8–23)
CALCIUM SERPL-MCNC: 9.1 MG/DL (ref 8.3–10.4)
CHLORIDE SERPL-SCNC: 105 MMOL/L (ref 101–110)
CHOLEST SERPL-MCNC: 156 MG/DL
CO2 SERPL-SCNC: 30 MMOL/L (ref 21–32)
CREAT SERPL-MCNC: 1.1 MG/DL (ref 0.8–1.5)
ERYTHROCYTE [DISTWIDTH] IN BLOOD BY AUTOMATED COUNT: 13.7 % (ref 11.9–14.6)
GLOBULIN SER CALC-MCNC: 3.6 G/DL (ref 2.8–4.5)
GLUCOSE SERPL-MCNC: 88 MG/DL (ref 65–100)
HCT VFR BLD AUTO: 47.5 % (ref 41.1–50.3)
HDLC SERPL-MCNC: 71 MG/DL (ref 40–60)
HDLC SERPL: 2.2
HGB BLD-MCNC: 15.1 G/DL (ref 13.6–17.2)
LDLC SERPL CALC-MCNC: 72.8 MG/DL
MCH RBC QN AUTO: 29.8 PG (ref 26.1–32.9)
MCHC RBC AUTO-ENTMCNC: 31.8 G/DL (ref 31.4–35)
MCV RBC AUTO: 93.7 FL (ref 82–102)
NRBC # BLD: 0 K/UL (ref 0–0.2)
PLATELET # BLD AUTO: 179 K/UL (ref 150–450)
PMV BLD AUTO: 10.3 FL (ref 9.4–12.3)
POTASSIUM SERPL-SCNC: 4.3 MMOL/L (ref 3.5–5.1)
PROT SERPL-MCNC: 7.5 G/DL (ref 6.3–8.2)
RBC # BLD AUTO: 5.07 M/UL (ref 4.23–5.6)
SODIUM SERPL-SCNC: 140 MMOL/L (ref 133–143)
TRIGL SERPL-MCNC: 61 MG/DL (ref 35–150)
TSH, 3RD GENERATION: 3.32 UIU/ML (ref 0.36–3.74)
VLDLC SERPL CALC-MCNC: 12.2 MG/DL (ref 6–23)
WBC # BLD AUTO: 7.8 K/UL (ref 4.3–11.1)

## 2023-07-28 LAB
EST. AVERAGE GLUCOSE BLD GHB EST-MCNC: 117 MG/DL
HBA1C MFR BLD: 5.7 % (ref 4.8–5.6)

## 2023-07-31 ENCOUNTER — OFFICE VISIT (OUTPATIENT)
Dept: INTERNAL MEDICINE CLINIC | Facility: CLINIC | Age: 78
End: 2023-07-31
Payer: MEDICARE

## 2023-07-31 VITALS
DIASTOLIC BLOOD PRESSURE: 70 MMHG | WEIGHT: 188 LBS | TEMPERATURE: 97.3 F | HEIGHT: 67 IN | SYSTOLIC BLOOD PRESSURE: 132 MMHG | BODY MASS INDEX: 29.51 KG/M2 | HEART RATE: 77 BPM | OXYGEN SATURATION: 93 %

## 2023-07-31 DIAGNOSIS — R73.03 PRE-DIABETES: Primary | ICD-10-CM

## 2023-07-31 DIAGNOSIS — F32.1 MAJOR DEPRESSIVE DISORDER, SINGLE EPISODE, MODERATE (HCC): ICD-10-CM

## 2023-07-31 DIAGNOSIS — F03.90 DEMENTIA WITHOUT BEHAVIORAL DISTURBANCE (HCC): ICD-10-CM

## 2023-07-31 DIAGNOSIS — Z95.3 HISTORY OF AORTIC VALVE REPLACEMENT WITH BIOPROSTHETIC VALVE: ICD-10-CM

## 2023-07-31 DIAGNOSIS — E78.2 MIXED HYPERLIPIDEMIA: ICD-10-CM

## 2023-07-31 DIAGNOSIS — Z00.00 MEDICARE ANNUAL WELLNESS VISIT, SUBSEQUENT: ICD-10-CM

## 2023-07-31 PROCEDURE — 3078F DIAST BP <80 MM HG: CPT | Performed by: INTERNAL MEDICINE

## 2023-07-31 PROCEDURE — 99214 OFFICE O/P EST MOD 30 MIN: CPT | Performed by: INTERNAL MEDICINE

## 2023-07-31 PROCEDURE — 3075F SYST BP GE 130 - 139MM HG: CPT | Performed by: INTERNAL MEDICINE

## 2023-07-31 PROCEDURE — 1123F ACP DISCUSS/DSCN MKR DOCD: CPT | Performed by: INTERNAL MEDICINE

## 2023-07-31 RX ORDER — ESCITALOPRAM OXALATE 20 MG/1
20 TABLET ORAL DAILY
Qty: 90 TABLET | Refills: 3 | Status: SHIPPED | OUTPATIENT
Start: 2023-07-31

## 2023-07-31 SDOH — ECONOMIC STABILITY: HOUSING INSECURITY
IN THE LAST 12 MONTHS, WAS THERE A TIME WHEN YOU DID NOT HAVE A STEADY PLACE TO SLEEP OR SLEPT IN A SHELTER (INCLUDING NOW)?: NO

## 2023-07-31 SDOH — ECONOMIC STABILITY: INCOME INSECURITY: HOW HARD IS IT FOR YOU TO PAY FOR THE VERY BASICS LIKE FOOD, HOUSING, MEDICAL CARE, AND HEATING?: NOT HARD AT ALL

## 2023-07-31 SDOH — ECONOMIC STABILITY: FOOD INSECURITY: WITHIN THE PAST 12 MONTHS, YOU WORRIED THAT YOUR FOOD WOULD RUN OUT BEFORE YOU GOT MONEY TO BUY MORE.: NEVER TRUE

## 2023-07-31 SDOH — ECONOMIC STABILITY: FOOD INSECURITY: WITHIN THE PAST 12 MONTHS, THE FOOD YOU BOUGHT JUST DIDN'T LAST AND YOU DIDN'T HAVE MONEY TO GET MORE.: NEVER TRUE

## 2023-07-31 ASSESSMENT — ENCOUNTER SYMPTOMS: CHEST TIGHTNESS: 0

## 2023-07-31 NOTE — PROGRESS NOTES
Ran Out of Food in the Last Year: Never true   Transportation Needs: Unknown    Lack of Transportation (Medical): Not on file    Lack of Transportation (Non-Medical): No   Physical Activity: Inactive    Days of Exercise per Week: 0 days    Minutes of Exercise per Session: 0 min   Stress: Not on file   Social Connections: Not on file   Intimate Partner Violence: Not on file   Housing Stability: Unknown    Unable to Pay for Housing in the Last Year: Not on file    Number of Places Lived in the Last Year: Not on file    Unstable Housing in the Last Year: No         Current Outpatient Medications:     escitalopram (LEXAPRO) 20 MG tablet, Take 1 tablet by mouth daily, Disp: 90 tablet, Rfl: 3    fluticasone (FLONASE) 50 MCG/ACT nasal spray, SPRAY 2 SPRAYS INTO EACH NOSTRIL EVERY DAY, Disp: 48 g, Rfl: 1    lisinopril (PRINIVIL;ZESTRIL) 5 MG tablet, Take 1 tablet by mouth daily, Disp: 90 tablet, Rfl: 3    pravastatin (PRAVACHOL) 80 MG tablet, Take 1 tablet by mouth daily, Disp: 90 tablet, Rfl: 3    aspirin 81 MG EC tablet, Take 1 tablet by mouth 2 times daily, Disp: 70 tablet, Rfl: 0    B Complex-C-Folic Acid (STRESS B COMPLEX PO), Take 1 tablet by mouth daily, Disp: , Rfl:     Multiple Vitamin (MULTIVITAMIN ADULT PO), Take 1 tablet by mouth daily, Disp: , Rfl:     CPAP Machine MISC, Use as Instructed. CPAP 7cmh20, Disp: , Rfl:     calcium carbonate (OSCAL) 500 MG TABS tablet, Take 3 tablets by mouth 2 times daily, Disp: , Rfl:     vitamin D 25 MCG (1000 UT) CAPS, Take 1 capsule by mouth daily, Disp: , Rfl:     Allergies   Allergen Reactions    Penicillins Other (See Comments) and Rash     Fever, joint pain, dizzy           Review of Systems   Constitutional:  Negative for fever and unexpected weight change. Respiratory:  Negative for chest tightness. Cardiovascular:  Negative for chest pain and palpitations. Musculoskeletal:  Positive for arthralgias. Neurological:  Positive for dizziness.        Vitals:    07/31/23

## 2024-01-05 RX ORDER — FLUTICASONE PROPIONATE 50 MCG
SPRAY, SUSPENSION (ML) NASAL
Qty: 1 EACH | Refills: 5 | Status: SHIPPED | OUTPATIENT
Start: 2024-01-05

## 2024-02-05 ENCOUNTER — NURSE ONLY (OUTPATIENT)
Dept: INTERNAL MEDICINE CLINIC | Facility: CLINIC | Age: 79
End: 2024-02-05

## 2024-02-05 DIAGNOSIS — F03.90 DEMENTIA WITHOUT BEHAVIORAL DISTURBANCE (HCC): ICD-10-CM

## 2024-02-05 DIAGNOSIS — R73.03 PRE-DIABETES: ICD-10-CM

## 2024-02-05 DIAGNOSIS — E78.2 MIXED HYPERLIPIDEMIA: ICD-10-CM

## 2024-02-05 DIAGNOSIS — F32.1 MAJOR DEPRESSIVE DISORDER, SINGLE EPISODE, MODERATE (HCC): ICD-10-CM

## 2024-02-05 LAB
ALBUMIN SERPL-MCNC: 4 G/DL (ref 3.2–4.6)
ALBUMIN/GLOB SERPL: 1.3 (ref 0.4–1.6)
ALP SERPL-CCNC: 74 U/L (ref 50–136)
ALT SERPL-CCNC: 25 U/L (ref 12–65)
ANION GAP SERPL CALC-SCNC: 4 MMOL/L (ref 2–11)
AST SERPL-CCNC: 25 U/L (ref 15–37)
BILIRUB SERPL-MCNC: 0.6 MG/DL (ref 0.2–1.1)
BUN SERPL-MCNC: 19 MG/DL (ref 8–23)
CALCIUM SERPL-MCNC: 9.1 MG/DL (ref 8.3–10.4)
CHLORIDE SERPL-SCNC: 107 MMOL/L (ref 103–113)
CHOLEST SERPL-MCNC: 171 MG/DL
CO2 SERPL-SCNC: 31 MMOL/L (ref 21–32)
CREAT SERPL-MCNC: 1.2 MG/DL (ref 0.8–1.5)
ERYTHROCYTE [DISTWIDTH] IN BLOOD BY AUTOMATED COUNT: 13.1 % (ref 11.9–14.6)
EST. AVERAGE GLUCOSE BLD GHB EST-MCNC: 114 MG/DL
GLOBULIN SER CALC-MCNC: 3.2 G/DL (ref 2.8–4.5)
GLUCOSE SERPL-MCNC: 99 MG/DL (ref 65–100)
HBA1C MFR BLD: 5.6 % (ref 4.8–5.6)
HCT VFR BLD AUTO: 49 % (ref 41.1–50.3)
HDLC SERPL-MCNC: 64 MG/DL (ref 40–60)
HDLC SERPL: 2.7
HGB BLD-MCNC: 15.4 G/DL (ref 13.6–17.2)
LDLC SERPL CALC-MCNC: 92.6 MG/DL
MCH RBC QN AUTO: 30.5 PG (ref 26.1–32.9)
MCHC RBC AUTO-ENTMCNC: 31.4 G/DL (ref 31.4–35)
MCV RBC AUTO: 97 FL (ref 82–102)
NRBC # BLD: 0 K/UL (ref 0–0.2)
PLATELET # BLD AUTO: 184 K/UL (ref 150–450)
PMV BLD AUTO: 10.6 FL (ref 9.4–12.3)
POTASSIUM SERPL-SCNC: 4.4 MMOL/L (ref 3.5–5.1)
PROT SERPL-MCNC: 7.2 G/DL (ref 6.3–8.2)
RBC # BLD AUTO: 5.05 M/UL (ref 4.23–5.6)
SODIUM SERPL-SCNC: 142 MMOL/L (ref 136–146)
TRIGL SERPL-MCNC: 72 MG/DL (ref 35–150)
TSH, 3RD GENERATION: 4.7 UIU/ML (ref 0.36–3.74)
VLDLC SERPL CALC-MCNC: 14.4 MG/DL (ref 6–23)
WBC # BLD AUTO: 7.4 K/UL (ref 4.3–11.1)

## 2024-02-12 ENCOUNTER — OFFICE VISIT (OUTPATIENT)
Dept: INTERNAL MEDICINE CLINIC | Facility: CLINIC | Age: 79
End: 2024-02-12

## 2024-02-12 VITALS
TEMPERATURE: 97.6 F | WEIGHT: 186 LBS | BODY MASS INDEX: 29.13 KG/M2 | SYSTOLIC BLOOD PRESSURE: 120 MMHG | HEART RATE: 55 BPM | DIASTOLIC BLOOD PRESSURE: 68 MMHG | OXYGEN SATURATION: 93 %

## 2024-02-12 DIAGNOSIS — Z23 NEED FOR PNEUMOCOCCAL VACCINE: ICD-10-CM

## 2024-02-12 DIAGNOSIS — R79.89 TSH ELEVATION: ICD-10-CM

## 2024-02-12 DIAGNOSIS — Z00.00 MEDICARE ANNUAL WELLNESS VISIT, SUBSEQUENT: Primary | ICD-10-CM

## 2024-02-12 DIAGNOSIS — E78.2 MIXED HYPERLIPIDEMIA: ICD-10-CM

## 2024-02-12 DIAGNOSIS — I10 ESSENTIAL HYPERTENSION, BENIGN: ICD-10-CM

## 2024-02-12 PROBLEM — E21.3 HYPERPARATHYROIDISM, UNSPECIFIED (HCC): Status: RESOLVED | Noted: 2023-01-26 | Resolved: 2024-02-12

## 2024-02-12 RX ORDER — LISINOPRIL 10 MG/1
10 TABLET ORAL DAILY
COMMUNITY
Start: 2023-12-05 | End: 2024-02-12

## 2024-02-12 RX ORDER — LISINOPRIL 5 MG/1
5 TABLET ORAL DAILY
Qty: 90 TABLET | Refills: 3 | Status: SHIPPED | OUTPATIENT
Start: 2024-02-12

## 2024-02-12 RX ORDER — PRAVASTATIN SODIUM 80 MG/1
80 TABLET ORAL DAILY
Qty: 90 TABLET | Refills: 3 | Status: SHIPPED | OUTPATIENT
Start: 2024-02-12

## 2024-02-12 NOTE — PROGRESS NOTES
Boris was seen today for follow-up and medicare aw.    Diagnoses and all orders for this visit:    Medicare annual wellness visit, subsequent  -     External Referral to Ophthalmology    Essential hypertension, benign  -     lisinopril (PRINIVIL;ZESTRIL) 5 MG tablet; Take 1 tablet by mouth daily  -     TSH; Future  -     T4, Free; Future    Mixed hyperlipidemia  -     pravastatin (PRAVACHOL) 80 MG tablet; Take 1 tablet by mouth daily    Need for pneumococcal vaccine  -     Pneumococcal, PCV20, PREVNAR 20, (age 6w+), IM, PF    TSH elevation  -     TSH; Future  -     T4, Free; Future          Boris Collins is a 78 y.o. male    Chief Complaint   Patient presents with    Follow-up     Check up and review labs    Medicare AWV     Htn-doing well    Memory seems stable  Wt Readings from Last 3 Encounters:   02/12/24 84.4 kg (186 lb)   07/31/23 85.3 kg (188 lb)   01/26/23 83 kg (183 lb)       Depression--feeling good,ssri helpful.  Hyperlipid  Lab Results   Component Value Date    CHOL 171 02/05/2024    CHOL 156 07/27/2023    CHOL 155 01/24/2023     Lab Results   Component Value Date    TRIG 72 02/05/2024    TRIG 61 07/27/2023    TRIG 52 01/24/2023     Lab Results   Component Value Date    HDL 64 (H) 02/05/2024    HDL 71 (H) 07/27/2023    HDL 61 (H) 01/24/2023     Lab Results   Component Value Date    LDLCALC 92.6 02/05/2024    LDLCALC 72.8 07/27/2023    LDLCALC 83.6 01/24/2023     Lab Results   Component Value Date    VLDL 15 01/20/2022    VLDL 13 07/15/2021    VLDL 12 01/13/2021     Lab Results   Component Value Date    CHOLHDLRATIO 2.7 02/05/2024    CHOLHDLRATIO 2.2 07/27/2023    CHOLHDLRATIO 2.5 01/24/2023        Nurse Only on 02/05/2024   Component Date Value Ref Range Status    WBC 02/05/2024 7.4  4.3 - 11.1 K/uL Final    RBC 02/05/2024 5.05  4.23 - 5.6 M/uL Final    Hemoglobin 02/05/2024 15.4  13.6 - 17.2 g/dL Final    Hematocrit 02/05/2024 49.0  41.1 - 50.3 % Final    MCV 02/05/2024 97.0  82 - 102 FL Final

## 2024-02-12 NOTE — PATIENT INSTRUCTIONS
instruction, always ask your healthcare professional. [employer], [employer] disclaims any warranty or liability for your use of this information.      Personalized Preventive Plan for Boris Collins - 2/12/2024  Medicare offers a range of preventive health benefits. Some of the tests and screenings are paid in full while other may be subject to a deductible, co-insurance, and/or copay.    Some of these benefits include a comprehensive review of your medical history including lifestyle, illnesses that may run in your family, and various assessments and screenings as appropriate.    After reviewing your medical record and screening and assessments performed today your provider may have ordered immunizations, labs, imaging, and/or referrals for you.  A list of these orders (if applicable) as well as your Preventive Care list are included within your After Visit Summary for your review.    Other Preventive Recommendations:    A preventive eye exam performed by an eye specialist is recommended every 1-2 years to screen for glaucoma; cataracts, macular degeneration, and other eye disorders.  A preventive dental visit is recommended every 6 months.  Try to get at least 150 minutes of exercise per week or 10,000 steps per day on a pedometer .  Order or download the FREE \"Exercise & Physical Activity: Your Everyday Guide\" from [employer]. Call 1-637.545.7442 or search [employer] online.  You need 2471-7076 mg of calcium and 7690-2115 IU of vitamin D per day. It is possible to meet your calcium requirement with diet alone, but a vitamin D supplement is usually necessary to meet this goal.  When exposed to the sun, use a sunscreen that protects against both UVA and UVB radiation with an SPF of 30 or greater. Reapply every 2 to 3 hours or after sweating, drying off with a towel, or swimming.  Always wear a seat belt when traveling in a car. Always wear a helmet when riding a

## 2024-05-07 ENCOUNTER — NURSE ONLY (OUTPATIENT)
Dept: INTERNAL MEDICINE CLINIC | Facility: CLINIC | Age: 79
End: 2024-05-07

## 2024-05-07 DIAGNOSIS — I10 ESSENTIAL HYPERTENSION, BENIGN: ICD-10-CM

## 2024-05-07 DIAGNOSIS — R79.89 TSH ELEVATION: ICD-10-CM

## 2024-05-07 LAB
T4 FREE SERPL-MCNC: 0.9 NG/DL (ref 0.9–1.7)
TSH, 3RD GENERATION: 6.75 UIU/ML (ref 0.27–4.2)

## 2024-05-13 ENCOUNTER — OFFICE VISIT (OUTPATIENT)
Dept: INTERNAL MEDICINE CLINIC | Facility: CLINIC | Age: 79
End: 2024-05-13
Payer: MEDICARE

## 2024-05-13 VITALS
BODY MASS INDEX: 29.51 KG/M2 | HEART RATE: 65 BPM | DIASTOLIC BLOOD PRESSURE: 68 MMHG | TEMPERATURE: 98.7 F | HEIGHT: 67 IN | OXYGEN SATURATION: 96 % | WEIGHT: 188 LBS | SYSTOLIC BLOOD PRESSURE: 112 MMHG

## 2024-05-13 DIAGNOSIS — F03.90 DEMENTIA WITHOUT BEHAVIORAL DISTURBANCE (HCC): ICD-10-CM

## 2024-05-13 DIAGNOSIS — F32.1 MAJOR DEPRESSIVE DISORDER, SINGLE EPISODE, MODERATE (HCC): ICD-10-CM

## 2024-05-13 DIAGNOSIS — E03.9 ACQUIRED HYPOTHYROIDISM: Primary | ICD-10-CM

## 2024-05-13 PROCEDURE — 99214 OFFICE O/P EST MOD 30 MIN: CPT | Performed by: INTERNAL MEDICINE

## 2024-05-13 PROCEDURE — 3078F DIAST BP <80 MM HG: CPT | Performed by: INTERNAL MEDICINE

## 2024-05-13 PROCEDURE — 1123F ACP DISCUSS/DSCN MKR DOCD: CPT | Performed by: INTERNAL MEDICINE

## 2024-05-13 PROCEDURE — 3074F SYST BP LT 130 MM HG: CPT | Performed by: INTERNAL MEDICINE

## 2024-05-13 RX ORDER — ESCITALOPRAM OXALATE 20 MG/1
20 TABLET ORAL DAILY
Qty: 90 TABLET | Refills: 3 | Status: SHIPPED | OUTPATIENT
Start: 2024-05-13

## 2024-05-13 RX ORDER — LEVOTHYROXINE SODIUM 0.05 MG/1
50 TABLET ORAL DAILY
Qty: 90 TABLET | Refills: 1 | Status: SHIPPED | OUTPATIENT
Start: 2024-05-13

## 2024-05-13 ASSESSMENT — ENCOUNTER SYMPTOMS
WHEEZING: 0
DIARRHEA: 0
CONSTIPATION: 0
SHORTNESS OF BREATH: 0
NAUSEA: 0
SINUS PAIN: 0
VOMITING: 0
ABDOMINAL PAIN: 0

## 2024-05-13 NOTE — PROGRESS NOTES
Boris was seen today for follow-up.    Diagnoses and all orders for this visit:    Acquired hypothyroidism  -     levothyroxine (SYNTHROID) 50 MCG tablet; Take 1 tablet by mouth daily  -     TSH; Future  -     T4, Free; Future    Major depressive disorder, single episode, moderate (HCC)  -     escitalopram (LEXAPRO) 20 MG tablet; Take 1 tablet by mouth daily    Dementia without behavioral disturbance (HCC)  -     escitalopram (LEXAPRO) 20 MG tablet; Take 1 tablet by mouth daily          Boris Collins is a 78 y.o. male    Chief Complaint   Patient presents with    Follow-up     3 month check up and review labs     Lab Results   Component Value Date    LABA1C 5.6 02/05/2024    LABA1C 5.7 (H) 07/27/2023    LABA1C 5.9 (H) 10/04/2022     Lab Results   Component Value Date    CREATININE 1.20 02/05/2024     Wt Readings from Last 3 Encounters:   05/13/24 85.3 kg (188 lb)   02/12/24 84.4 kg (186 lb)   07/31/23 85.3 kg (188 lb)         Nurse Only on 05/07/2024   Component Date Value Ref Range Status    T4 Free 05/07/2024 0.9  0.9 - 1.7 NG/DL Final    TSH, 3rd Generation 05/07/2024 6.750 (H)  0.270 - 4.200 uIU/mL Final        Past Medical History:   Diagnosis Date    Actinic keratosis     Actinic keratosis     Arthritis     CAD (coronary artery disease)     CABG ,bovine valve    Cancer (HCC)     Cellulitis and abscess of leg, except foot     Disorders of magnesium metabolism     Essential hypertension, benign     Heart failure (HCC)     MI 2013    High cholesterol     History of blood transfusion     History of prostate cancer     Hyperparathyroidism, unspecified (HCC)     managed with medication     Mixed hyperlipidemia     Neoplasm of uncertain behavior of skin     JUSTINA (obstructive sleep apnea)     uses CPAP    S/P AVR (aortic valve replacement)     Scabies     Wears hearing aid        Family History   Problem Relation Age of Onset    Heart Disease Father         CVD/athrosclerotic    Diabetes Mother         Social

## 2024-07-09 ENCOUNTER — NURSE ONLY (OUTPATIENT)
Dept: INTERNAL MEDICINE CLINIC | Facility: CLINIC | Age: 79
End: 2024-07-09

## 2024-07-09 DIAGNOSIS — E03.9 ACQUIRED HYPOTHYROIDISM: ICD-10-CM

## 2024-07-09 LAB
T4 FREE SERPL-MCNC: 1.2 NG/DL (ref 0.9–1.7)
TSH, 3RD GENERATION: 2.9 UIU/ML (ref 0.27–4.2)

## 2024-07-16 ENCOUNTER — OFFICE VISIT (OUTPATIENT)
Dept: INTERNAL MEDICINE CLINIC | Facility: CLINIC | Age: 79
End: 2024-07-16
Payer: MEDICARE

## 2024-07-16 VITALS
TEMPERATURE: 98.2 F | BODY MASS INDEX: 29.03 KG/M2 | HEIGHT: 67 IN | WEIGHT: 185 LBS | DIASTOLIC BLOOD PRESSURE: 70 MMHG | HEART RATE: 77 BPM | SYSTOLIC BLOOD PRESSURE: 130 MMHG | OXYGEN SATURATION: 93 %

## 2024-07-16 DIAGNOSIS — F03.90 DEMENTIA WITHOUT BEHAVIORAL DISTURBANCE (HCC): ICD-10-CM

## 2024-07-16 DIAGNOSIS — I10 ESSENTIAL HYPERTENSION, BENIGN: Primary | ICD-10-CM

## 2024-07-16 DIAGNOSIS — E03.9 ACQUIRED HYPOTHYROIDISM: ICD-10-CM

## 2024-07-16 PROCEDURE — 1123F ACP DISCUSS/DSCN MKR DOCD: CPT | Performed by: INTERNAL MEDICINE

## 2024-07-16 PROCEDURE — 3075F SYST BP GE 130 - 139MM HG: CPT | Performed by: INTERNAL MEDICINE

## 2024-07-16 PROCEDURE — 99213 OFFICE O/P EST LOW 20 MIN: CPT | Performed by: INTERNAL MEDICINE

## 2024-07-16 PROCEDURE — 3078F DIAST BP <80 MM HG: CPT | Performed by: INTERNAL MEDICINE

## 2024-07-16 RX ORDER — LEVOTHYROXINE SODIUM 0.05 MG/1
50 TABLET ORAL DAILY
Qty: 90 TABLET | Refills: 3 | Status: SHIPPED | OUTPATIENT
Start: 2024-07-16

## 2024-07-16 NOTE — PROGRESS NOTES
tablet, Take 3 tablets by mouth 2 times daily, Disp: , Rfl:     vitamin D 25 MCG (1000 UT) CAPS, Take 1 capsule by mouth daily, Disp: , Rfl:     aspirin 81 MG EC tablet, Take 1 tablet by mouth 2 times daily, Disp: 70 tablet, Rfl: 0    Allergies   Allergen Reactions    Penicillins Other (See Comments) and Rash     Fever, joint pain, dizzy           Review of Systems      Vitals:    07/16/24 1336   BP: 130/70   Pulse: 77   Temp: 98.2 °F (36.8 °C)   TempSrc: Temporal   SpO2: 93%   Weight: 83.9 kg (185 lb)   Height: 1.702 m (5' 7\")       Wt Readings from Last 3 Encounters:   07/16/24 83.9 kg (185 lb)   05/13/24 85.3 kg (188 lb)   02/12/24 84.4 kg (186 lb)         Physical Exam  Constitutional:       General: He is not in acute distress.     Appearance: Normal appearance. He is not ill-appearing.   HENT:      Head: Normocephalic and atraumatic.      Nose: Nose normal.   Eyes:      General: No scleral icterus.     Extraocular Movements: Extraocular movements intact.      Conjunctiva/sclera: Conjunctivae normal.   Cardiovascular:      Rate and Rhythm: Normal rate and regular rhythm.      Pulses: Normal pulses.      Heart sounds: Murmur heard.   Pulmonary:      Effort: Pulmonary effort is normal. No respiratory distress.      Breath sounds: Normal breath sounds.   Abdominal:      General: Abdomen is flat. Bowel sounds are normal. There is no distension.      Palpations: Abdomen is soft.   Musculoskeletal:      Cervical back: Neck supple. No rigidity.      Right lower leg: No edema.      Left lower leg: No edema.        Legs:    Skin:     Coloration: Skin is not jaundiced.   Neurological:      General: No focal deficit present.      Mental Status: He is alert. Mental status is at baseline.   Psychiatric:         Mood and Affect: Mood normal.         Thought Content: Thought content normal.            Boris was seen today for follow-up.    Diagnoses and all orders for this visit:    Essential hypertension, benign    Acquired

## 2024-07-19 ENCOUNTER — TELEPHONE (OUTPATIENT)
Dept: INTERNAL MEDICINE CLINIC | Facility: CLINIC | Age: 79
End: 2024-07-19

## 2024-07-19 NOTE — TELEPHONE ENCOUNTER
Patients wife called and would like to know if the patient could be referred out to a neurologist in regards to his dementia. Please Advise.

## 2024-08-14 ENCOUNTER — OFFICE VISIT (OUTPATIENT)
Dept: NEUROLOGY | Age: 79
End: 2024-08-14
Payer: MEDICARE

## 2024-08-14 VITALS
SYSTOLIC BLOOD PRESSURE: 136 MMHG | HEART RATE: 49 BPM | BODY MASS INDEX: 28.41 KG/M2 | WEIGHT: 181 LBS | HEIGHT: 67 IN | RESPIRATION RATE: 16 BRPM | DIASTOLIC BLOOD PRESSURE: 70 MMHG | OXYGEN SATURATION: 90 %

## 2024-08-14 DIAGNOSIS — R26.81 UNSTEADY GAIT: ICD-10-CM

## 2024-08-14 DIAGNOSIS — M54.50 CHRONIC BILATERAL LOW BACK PAIN WITHOUT SCIATICA: ICD-10-CM

## 2024-08-14 DIAGNOSIS — F32.1 MAJOR DEPRESSIVE DISORDER, SINGLE EPISODE, MODERATE (HCC): ICD-10-CM

## 2024-08-14 DIAGNOSIS — G89.29 CHRONIC BILATERAL LOW BACK PAIN WITHOUT SCIATICA: ICD-10-CM

## 2024-08-14 DIAGNOSIS — Z91.81 AT HIGH RISK FOR FALLS: ICD-10-CM

## 2024-08-14 DIAGNOSIS — G31.84 MCI (MILD COGNITIVE IMPAIRMENT) WITH MEMORY LOSS: Primary | ICD-10-CM

## 2024-08-14 PROCEDURE — 99204 OFFICE O/P NEW MOD 45 MIN: CPT | Performed by: NURSE PRACTITIONER

## 2024-08-14 PROCEDURE — 3078F DIAST BP <80 MM HG: CPT | Performed by: NURSE PRACTITIONER

## 2024-08-14 PROCEDURE — 3075F SYST BP GE 130 - 139MM HG: CPT | Performed by: NURSE PRACTITIONER

## 2024-08-14 PROCEDURE — 1123F ACP DISCUSS/DSCN MKR DOCD: CPT | Performed by: NURSE PRACTITIONER

## 2024-08-14 RX ORDER — LISINOPRIL 10 MG/1
10 TABLET ORAL
COMMUNITY
Start: 2024-06-03 | End: 2024-08-14 | Stop reason: DRUGHIGH

## 2024-08-14 RX ORDER — GINSENG 100 MG
50 CAPSULE ORAL DAILY
COMMUNITY

## 2024-08-14 RX ORDER — FOLIC ACID 0.8 MG
TABLET ORAL
COMMUNITY

## 2024-08-14 ASSESSMENT — COLUMBIA-SUICIDE SEVERITY RATING SCALE - C-SSRS
3. HAVE YOU BEEN THINKING ABOUT HOW YOU MIGHT KILL YOURSELF?: YES
6. HAVE YOU EVER DONE ANYTHING, STARTED TO DO ANYTHING, OR PREPARED TO DO ANYTHING TO END YOUR LIFE?: NO
1. WITHIN THE PAST MONTH, HAVE YOU WISHED YOU WERE DEAD OR WISHED YOU COULD GO TO SLEEP AND NOT WAKE UP?: NO
2. HAVE YOU ACTUALLY HAD ANY THOUGHTS OF KILLING YOURSELF?: YES
5. HAVE YOU STARTED TO WORK OUT OR WORKED OUT THE DETAILS OF HOW TO KILL YOURSELF? DO YOU INTEND TO CARRY OUT THIS PLAN?: NO
4. HAVE YOU HAD THESE THOUGHTS AND HAD SOME INTENTION OF ACTING ON THEM?: NO

## 2024-08-14 ASSESSMENT — PATIENT HEALTH QUESTIONNAIRE - PHQ9
9. THOUGHTS THAT YOU WOULD BE BETTER OFF DEAD, OR OF HURTING YOURSELF: NEARLY EVERY DAY
2. FEELING DOWN, DEPRESSED OR HOPELESS: NEARLY EVERY DAY
SUM OF ALL RESPONSES TO PHQ QUESTIONS 1-9: 12
1. LITTLE INTEREST OR PLEASURE IN DOING THINGS: NEARLY EVERY DAY
SUM OF ALL RESPONSES TO PHQ QUESTIONS 1-9: 12
5. POOR APPETITE OR OVEREATING: NOT AT ALL
8. MOVING OR SPEAKING SO SLOWLY THAT OTHER PEOPLE COULD HAVE NOTICED. OR THE OPPOSITE, BEING SO FIGETY OR RESTLESS THAT YOU HAVE BEEN MOVING AROUND A LOT MORE THAN USUAL: NOT AT ALL
6. FEELING BAD ABOUT YOURSELF - OR THAT YOU ARE A FAILURE OR HAVE LET YOURSELF OR YOUR FAMILY DOWN: NEARLY EVERY DAY
SUM OF ALL RESPONSES TO PHQ9 QUESTIONS 1 & 2: 6
SUM OF ALL RESPONSES TO PHQ QUESTIONS 1-9: 12
3. TROUBLE FALLING OR STAYING ASLEEP: NOT AT ALL
10. IF YOU CHECKED OFF ANY PROBLEMS, HOW DIFFICULT HAVE THESE PROBLEMS MADE IT FOR YOU TO DO YOUR WORK, TAKE CARE OF THINGS AT HOME, OR GET ALONG WITH OTHER PEOPLE: VERY DIFFICULT
SUM OF ALL RESPONSES TO PHQ QUESTIONS 1-9: 9
7. TROUBLE CONCENTRATING ON THINGS, SUCH AS READING THE NEWSPAPER OR WATCHING TELEVISION: NOT AT ALL

## 2024-08-14 ASSESSMENT — ENCOUNTER SYMPTOMS
ALLERGIC/IMMUNOLOGIC NEGATIVE: 1
APNEA: 1
BACK PAIN: 1
TROUBLE SWALLOWING: 1
EYES NEGATIVE: 1
VOICE CHANGE: 0
GASTROINTESTINAL NEGATIVE: 1

## 2024-08-14 NOTE — PROGRESS NOTES
[facility]  [address], [address]  [place] [address]  [phone]      Chief Complaint   Patient presents with    Establish Care       Boris Collins is a 78 y.o. male who presents on referral from Dr. Tera Noel for dementia.    PMH significant for HTN, hypothyroidism, CAD, MI, CABG, s/p AVR (bovine), JUSTINA on CPAP, prostate cancer s/p prostectomy, former tobacco use, and hyperparathyroidism             Past Medical History:   Diagnosis Date    Actinic keratosis     Actinic keratosis     Arthritis     CAD (coronary artery disease)     CABG ,bovine valve    Cancer (HCC)     Cellulitis and abscess of leg, except foot     Disorders of magnesium metabolism     Essential hypertension, benign     Heart failure (HCC)     MI 2013    High cholesterol     History of blood transfusion     History of prostate cancer     Hyperparathyroidism, unspecified (HCC)     managed with medication     Mixed hyperlipidemia     Neoplasm of uncertain behavior of skin     JUSTINA (obstructive sleep apnea)     uses CPAP    S/P AVR (aortic valve replacement)     Scabies     Wears hearing aid        Past Surgical History:   Procedure Laterality Date    COLONOSCOPY N/A 3/8/2019    COLONOSCOPY/ 25/ ROOM 720 performed by Selvin Osborne MD at [facility] ENDOSCOPY    CORONARY ARTERY BYPASS GRAFT      x4, vein harvest    HEENT      partial thyroid     HERNIA REPAIR  12/2014    HX AAA OPEN REPAIR  07/24/2019    KNEE ARTHROSCOPY Left     ORTHOPEDIC SURGERY      [place] UNLISTED PROCEDURE CARDIAC SURGERY      PROSTATECTOMY      [facility]    TOTAL KNEE ARTHROPLASTY Left 10/20/2022    KNEE TOTAL ARTHROPLASTY ROBOTIC LEFT SDD performed by Benjamin Aguirre MD at [facility] MAIN OR       Family History   Problem Relation Age of Onset    Heart Disease Father         CVD/athrosclerotic    Diabetes Mother        Social History     Socioeconomic History    Marital status:    Tobacco Use    Smoking status: Never    Smokeless

## 2024-08-14 NOTE — PROGRESS NOTES
[facility]  [address], [address]  [place] [address]  [phone]      Chief Complaint   Patient presents with    Establish Care       Boris Collins is a 78 y.o. male who presents on referral from Dr. Tera Noel for dementia.    PMH significant for HTN, hypothyroidism, CAD, MI, CABG, s/p AVR (bovine), JUSTINA on CPAP, prostate cancer s/p prostectomy, former tobacco use, and hyperparathyroidism who is being seen today progressive memory difficulties that started ~2 years ago.     He has gotten lost on one occasion while driving, which has been limited and his wife is the primary .  Wife noted that he had experienced lack of inhibition and has been more impulsive. Sleeping more, he has been more sedentary. Forgot to take his medications in the past. Wife writes notes to help him to remind of task. Difficulty with recall, focus and attention. Stated he has felt her grab his ankle while sleeping, however no one was present, wife noted slower speech and dysphagia. Endorse vivid dreams and REM sleep behaviors. Denies changes in bowel/bladder.     Endorses balance issues that started last year, unstable with ambulating down hills or drive ways, ambulates with cane. Endorse 1 fall without injury, did not hit his head or LOC.     M, completed post secondary education. 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    No family hx of dementia or neurological disorder.     Currently chews tobacco, former cigarette use as teenager, former alcohol use 12 bottles of beer daily and stopped drinking at the age of 17. No recreational drugs.     Drinks 3-4 cups of coffee daily, 2 cups of water daily. No change in eating, well balance meals with good appetite.     JUSTINA with CPAP- compliant with CPAP nightly.     Denies recent illness, changes in medication or head trauma.        Past Medical History:   Diagnosis Date    Actinic keratosis     Actinic keratosis

## 2024-09-11 ENCOUNTER — HOSPITAL ENCOUNTER (OUTPATIENT)
Dept: MRI IMAGING | Age: 79
Discharge: HOME | End: 2024-09-14
Payer: MEDICARE

## 2024-09-11 DIAGNOSIS — G31.84 MCI (MILD COGNITIVE IMPAIRMENT) WITH MEMORY LOSS: ICD-10-CM

## 2024-09-11 PROCEDURE — 70551 MRI BRAIN STEM W/O DYE: CPT

## 2024-09-16 ENCOUNTER — TELEPHONE (OUTPATIENT)
Dept: NEUROLOGY | Age: 79
End: 2024-09-16

## 2024-10-31 ENCOUNTER — OFFICE VISIT (OUTPATIENT)
Dept: NEUROLOGY | Age: 79
End: 2024-10-31

## 2024-10-31 VITALS
WEIGHT: 186 LBS | SYSTOLIC BLOOD PRESSURE: 166 MMHG | OXYGEN SATURATION: 99 % | DIASTOLIC BLOOD PRESSURE: 92 MMHG | HEIGHT: 67 IN | BODY MASS INDEX: 29.19 KG/M2 | HEART RATE: 92 BPM

## 2024-10-31 DIAGNOSIS — Z91.81 AT HIGH RISK FOR FALLS: ICD-10-CM

## 2024-10-31 DIAGNOSIS — F32.1 MAJOR DEPRESSIVE DISORDER, SINGLE EPISODE, MODERATE (HCC): ICD-10-CM

## 2024-10-31 DIAGNOSIS — G31.84 MCI (MILD COGNITIVE IMPAIRMENT) WITH MEMORY LOSS: Primary | ICD-10-CM

## 2024-10-31 DIAGNOSIS — G47.33 OSA (OBSTRUCTIVE SLEEP APNEA): ICD-10-CM

## 2024-10-31 DIAGNOSIS — R26.81 UNSTEADY GAIT: ICD-10-CM

## 2024-10-31 DIAGNOSIS — G31.84 MCI (MILD COGNITIVE IMPAIRMENT) WITH MEMORY LOSS: ICD-10-CM

## 2024-10-31 DIAGNOSIS — M54.50 CHRONIC BILATERAL LOW BACK PAIN WITHOUT SCIATICA: ICD-10-CM

## 2024-10-31 DIAGNOSIS — G89.29 CHRONIC BILATERAL LOW BACK PAIN WITHOUT SCIATICA: ICD-10-CM

## 2024-10-31 PROBLEM — Z95.2 S/P TAVR (TRANSCATHETER AORTIC VALVE REPLACEMENT): Status: ACTIVE | Noted: 2019-07-24

## 2024-10-31 PROBLEM — H91.93 BILATERAL HEARING LOSS: Status: ACTIVE | Noted: 2021-10-05

## 2024-10-31 PROBLEM — I35.0 NONRHEUMATIC AORTIC VALVE STENOSIS: Status: ACTIVE | Noted: 2019-06-04

## 2024-10-31 LAB
ANION GAP SERPL CALC-SCNC: 9 MMOL/L (ref 7–16)
BASOPHILS # BLD: 0.1 K/UL (ref 0–0.2)
BASOPHILS NFR BLD: 1 % (ref 0–2)
BUN SERPL-MCNC: 19 MG/DL (ref 8–23)
CALCIUM SERPL-MCNC: 9.5 MG/DL (ref 8.8–10.2)
CHLORIDE SERPL-SCNC: 103 MMOL/L (ref 98–107)
CO2 SERPL-SCNC: 30 MMOL/L (ref 20–29)
CREAT SERPL-MCNC: 0.97 MG/DL (ref 0.8–1.3)
DIFFERENTIAL METHOD BLD: NORMAL
EOSINOPHIL # BLD: 0.1 K/UL (ref 0–0.8)
EOSINOPHIL NFR BLD: 2 % (ref 0.5–7.8)
ERYTHROCYTE [DISTWIDTH] IN BLOOD BY AUTOMATED COUNT: 12.9 % (ref 11.9–14.6)
FOLATE SERPL-MCNC: >40 NG/ML (ref 3.1–17.5)
GLUCOSE SERPL-MCNC: 94 MG/DL (ref 70–99)
HCT VFR BLD AUTO: 46.7 % (ref 41.1–50.3)
HGB BLD-MCNC: 15.2 G/DL (ref 13.6–17.2)
IMM GRANULOCYTES # BLD AUTO: 0 K/UL (ref 0–0.5)
IMM GRANULOCYTES NFR BLD AUTO: 1 % (ref 0–5)
LYMPHOCYTES # BLD: 1.4 K/UL (ref 0.5–4.6)
LYMPHOCYTES NFR BLD: 21 % (ref 13–44)
MCH RBC QN AUTO: 31 PG (ref 26.1–32.9)
MCHC RBC AUTO-ENTMCNC: 32.5 G/DL (ref 31.4–35)
MCV RBC AUTO: 95.1 FL (ref 82–102)
MONOCYTES # BLD: 0.6 K/UL (ref 0.1–1.3)
MONOCYTES NFR BLD: 10 % (ref 4–12)
NEUTS SEG # BLD: 4.4 K/UL (ref 1.7–8.2)
NEUTS SEG NFR BLD: 65 % (ref 43–78)
NRBC # BLD: 0 K/UL (ref 0–0.2)
PLATELET # BLD AUTO: 175 K/UL (ref 150–450)
PMV BLD AUTO: 10.2 FL (ref 9.4–12.3)
POTASSIUM SERPL-SCNC: 4.1 MMOL/L (ref 3.5–5.1)
RBC # BLD AUTO: 4.91 M/UL (ref 4.23–5.6)
SODIUM SERPL-SCNC: 142 MMOL/L (ref 136–145)
TSH W FREE THYROID IF ABNORMAL: 1.93 UIU/ML (ref 0.27–4.2)
VIT B12 SERPL-MCNC: 847 PG/ML (ref 193–986)
WBC # BLD AUTO: 6.6 K/UL (ref 4.3–11.1)

## 2024-10-31 ASSESSMENT — PATIENT HEALTH QUESTIONNAIRE - PHQ9
SUM OF ALL RESPONSES TO PHQ QUESTIONS 1-9: 0
SUM OF ALL RESPONSES TO PHQ QUESTIONS 1-9: 0
SUM OF ALL RESPONSES TO PHQ9 QUESTIONS 1 & 2: 0
SUM OF ALL RESPONSES TO PHQ QUESTIONS 1-9: 0
SUM OF ALL RESPONSES TO PHQ QUESTIONS 1-9: 0
1. LITTLE INTEREST OR PLEASURE IN DOING THINGS: NOT AT ALL
2. FEELING DOWN, DEPRESSED OR HOPELESS: NOT AT ALL

## 2024-10-31 ASSESSMENT — ENCOUNTER SYMPTOMS
BACK PAIN: 1
APNEA: 1
EYES NEGATIVE: 1
VOICE CHANGE: 0
GASTROINTESTINAL NEGATIVE: 1
TROUBLE SWALLOWING: 1
ALLERGIC/IMMUNOLOGIC NEGATIVE: 1

## 2024-10-31 NOTE — PROGRESS NOTES
[facility]  [address], [address]  [place] [address]  [phone]      Chief Complaint   Patient presents with    Follow-up       Boris Collins is a 78 y.o. male who presents for follow up for dementia.     PMH significant for HTN, hypothyroidism, CAD, MI, CABG, s/p AVR (bovine), JUSTINA on CPAP, prostate cancer s/p prostectomy, former tobacco use, and hyperparathyroidism who is being seen today progressive memory difficulties that started ~2 years ago.     He has gotten lost on one occasion while driving, which has been limited and his wife is the primary .  Wife noted that he had experienced lack of inhibition and has been more impulsive. Sleeping more, he has been more sedentary. Forgot to take his medications in the past. Wife writes notes to help him to remind of task. Difficulty with recall, focus and attention. Stated he has felt her grab his ankle while sleeping, however no one was present, wife noted slower speech and dysphagia. Endorse vivid dreams and REM sleep behaviors. Denies changes in bowel/bladder.     Endorses balance issues that started last year, unstable with ambulating down hills or drive ways, ambulates with cane. Endorse 1 fall without injury, did not hit his head or LOC.     RHM, completed post secondary education. 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    No family hx of dementia or neurological disorder.     Currently chews tobacco, former cigarette use as teenager, former alcohol use 12 bottles of beer daily and stopped drinking at the age of 17. No recreational drugs.     Drinks 3-4 cups of coffee daily, 2 cups of water daily. No change in eating, well balance meals with good appetite.     JUSTINA with CPAP- compliant with CPAP nightly.     Denies recent illness, changes in medication or head trauma.     Interval history:    He is here today with his spouse and daughter.  Chronic [place], wearing hearing aides.

## 2024-10-31 NOTE — PATIENT INSTRUCTIONS
Plan:     Interventions that may decrease conversion from mild cognitive impairment to dementia or aid in the treatment of established dementia:   Reduce damage the brain by controlling vascular risk factors (SBP < 140 mmHg, normal cholesterol, BMI < 31 kg/m²).   2. Exercise 20 minutes three times per week reaching 80% maximum heart rate   3. Diet: adhere to a Mediterranean diet

## 2024-11-04 LAB — IMMUNOLOGIST REVIEW: NORMAL

## 2024-11-06 LAB
TESTOST FREE SERPL-MCNC: 4.7 PG/ML (ref 6.6–18.1)
TESTOST SERPL-MCNC: 471 NG/DL (ref 264–916)

## 2024-11-10 LAB — METHYLMALONATE SERPL-SCNC: 297 NMOL/L (ref 0–378)

## 2024-12-11 ENCOUNTER — OFFICE VISIT (OUTPATIENT)
Dept: INTERNAL MEDICINE CLINIC | Facility: CLINIC | Age: 79
End: 2024-12-11
Payer: MEDICARE

## 2024-12-11 VITALS
BODY MASS INDEX: 28.56 KG/M2 | WEIGHT: 182 LBS | DIASTOLIC BLOOD PRESSURE: 80 MMHG | OXYGEN SATURATION: 95 % | HEART RATE: 54 BPM | HEIGHT: 67 IN | TEMPERATURE: 98.4 F | SYSTOLIC BLOOD PRESSURE: 110 MMHG

## 2024-12-11 DIAGNOSIS — E03.9 ACQUIRED HYPOTHYROIDISM: ICD-10-CM

## 2024-12-11 DIAGNOSIS — R53.81 OTHER MALAISE: ICD-10-CM

## 2024-12-11 DIAGNOSIS — R40.0 DAYTIME SLEEPINESS: Primary | ICD-10-CM

## 2024-12-11 DIAGNOSIS — I10 ESSENTIAL HYPERTENSION, BENIGN: ICD-10-CM

## 2024-12-11 DIAGNOSIS — F32.1 MAJOR DEPRESSIVE DISORDER, SINGLE EPISODE, MODERATE (HCC): ICD-10-CM

## 2024-12-11 DIAGNOSIS — F03.90 DEMENTIA WITHOUT BEHAVIORAL DISTURBANCE (HCC): ICD-10-CM

## 2024-12-11 DIAGNOSIS — R40.0 DAYTIME SLEEPINESS: ICD-10-CM

## 2024-12-11 LAB
ALBUMIN SERPL-MCNC: 4 G/DL (ref 3.2–4.6)
ALBUMIN/GLOB SERPL: 1.2 (ref 1–1.9)
ALP SERPL-CCNC: 113 U/L (ref 40–129)
ALT SERPL-CCNC: 33 U/L (ref 8–55)
ANION GAP SERPL CALC-SCNC: 12 MMOL/L (ref 7–16)
AST SERPL-CCNC: 22 U/L (ref 15–37)
BASOPHILS # BLD: 0.1 K/UL (ref 0–0.2)
BASOPHILS NFR BLD: 1 % (ref 0–2)
BILIRUB SERPL-MCNC: 0.6 MG/DL (ref 0–1.2)
BILIRUBIN, URINE, POC: NEGATIVE
BLOOD URINE, POC: NEGATIVE
BUN SERPL-MCNC: 11 MG/DL (ref 8–23)
CALCIUM SERPL-MCNC: 10.4 MG/DL (ref 8.8–10.2)
CHLORIDE SERPL-SCNC: 102 MMOL/L (ref 98–107)
CO2 SERPL-SCNC: 25 MMOL/L (ref 20–29)
CREAT SERPL-MCNC: 0.85 MG/DL (ref 0.8–1.3)
DIFFERENTIAL METHOD BLD: NORMAL
EOSINOPHIL # BLD: 0.1 K/UL (ref 0–0.8)
EOSINOPHIL NFR BLD: 2 % (ref 0.5–7.8)
ERYTHROCYTE [DISTWIDTH] IN BLOOD BY AUTOMATED COUNT: 13 % (ref 11.9–14.6)
GLOBULIN SER CALC-MCNC: 3.5 G/DL (ref 2.3–3.5)
GLUCOSE SERPL-MCNC: 92 MG/DL (ref 70–99)
GLUCOSE URINE, POC: NEGATIVE
HCT VFR BLD AUTO: 44.9 % (ref 41.1–50.3)
HGB BLD-MCNC: 14.7 G/DL (ref 13.6–17.2)
IMM GRANULOCYTES # BLD AUTO: 0 K/UL (ref 0–0.5)
IMM GRANULOCYTES NFR BLD AUTO: 0 % (ref 0–5)
KETONES, URINE, POC: NEGATIVE
LEUKOCYTE ESTERASE, URINE, POC: NEGATIVE
LYMPHOCYTES # BLD: 2.2 K/UL (ref 0.5–4.6)
LYMPHOCYTES NFR BLD: 24 % (ref 13–44)
MAGNESIUM SERPL-MCNC: 2 MG/DL (ref 1.8–2.4)
MCH RBC QN AUTO: 31.3 PG (ref 26.1–32.9)
MCHC RBC AUTO-ENTMCNC: 32.7 G/DL (ref 31.4–35)
MCV RBC AUTO: 95.5 FL (ref 82–102)
MONOCYTES # BLD: 0.8 K/UL (ref 0.1–1.3)
MONOCYTES NFR BLD: 9 % (ref 4–12)
NEUTS SEG # BLD: 5.9 K/UL (ref 1.7–8.2)
NEUTS SEG NFR BLD: 64 % (ref 43–78)
NITRITE, URINE, POC: NORMAL
NRBC # BLD: 0 K/UL (ref 0–0.2)
PH, URINE, POC: 6 (ref 4.6–8)
PLATELET # BLD AUTO: 355 K/UL (ref 150–450)
PMV BLD AUTO: 9.6 FL (ref 9.4–12.3)
POTASSIUM SERPL-SCNC: 4.3 MMOL/L (ref 3.5–5.1)
PROT SERPL-MCNC: 7.4 G/DL (ref 6.3–8.2)
PROTEIN,URINE, POC: NEGATIVE
RBC # BLD AUTO: 4.7 M/UL (ref 4.23–5.6)
SODIUM SERPL-SCNC: 139 MMOL/L (ref 136–145)
SPECIFIC GRAVITY, URINE, POC: 1.01 (ref 1–1.03)
TSH W FREE THYROID IF ABNORMAL: 3.4 UIU/ML (ref 0.27–4.2)
URINALYSIS CLARITY, POC: CLEAR
URINALYSIS COLOR, POC: YELLOW
UROBILINOGEN, POC: NORMAL MG/DL
WBC # BLD AUTO: 9.2 K/UL (ref 4.3–11.1)

## 2024-12-11 PROCEDURE — 81002 URINALYSIS NONAUTO W/O SCOPE: CPT | Performed by: NURSE PRACTITIONER

## 2024-12-11 PROCEDURE — 1123F ACP DISCUSS/DSCN MKR DOCD: CPT | Performed by: NURSE PRACTITIONER

## 2024-12-11 PROCEDURE — 3074F SYST BP LT 130 MM HG: CPT | Performed by: NURSE PRACTITIONER

## 2024-12-11 PROCEDURE — 3079F DIAST BP 80-89 MM HG: CPT | Performed by: NURSE PRACTITIONER

## 2024-12-11 PROCEDURE — 1160F RVW MEDS BY RX/DR IN RCRD: CPT | Performed by: NURSE PRACTITIONER

## 2024-12-11 PROCEDURE — 99214 OFFICE O/P EST MOD 30 MIN: CPT | Performed by: NURSE PRACTITIONER

## 2024-12-11 PROCEDURE — 1159F MED LIST DOCD IN RCRD: CPT | Performed by: NURSE PRACTITIONER

## 2024-12-11 RX ORDER — LISINOPRIL 5 MG/1
5 TABLET ORAL DAILY
Qty: 90 TABLET | Refills: 3 | Status: SHIPPED | OUTPATIENT
Start: 2024-12-11

## 2024-12-11 RX ORDER — LEVOTHYROXINE SODIUM 50 UG/1
50 TABLET ORAL DAILY
Qty: 90 TABLET | Refills: 3 | Status: SHIPPED | OUTPATIENT
Start: 2024-12-11

## 2024-12-11 RX ORDER — ESCITALOPRAM OXALATE 20 MG/1
20 TABLET ORAL DAILY
Qty: 90 TABLET | Refills: 3 | Status: SHIPPED | OUTPATIENT
Start: 2024-12-11

## 2024-12-11 SDOH — ECONOMIC STABILITY: FOOD INSECURITY: WITHIN THE PAST 12 MONTHS, THE FOOD YOU BOUGHT JUST DIDN'T LAST AND YOU DIDN'T HAVE MONEY TO GET MORE.: NEVER TRUE

## 2024-12-11 SDOH — ECONOMIC STABILITY: FOOD INSECURITY: WITHIN THE PAST 12 MONTHS, YOU WORRIED THAT YOUR FOOD WOULD RUN OUT BEFORE YOU GOT MONEY TO BUY MORE.: NEVER TRUE

## 2024-12-11 SDOH — ECONOMIC STABILITY: INCOME INSECURITY: HOW HARD IS IT FOR YOU TO PAY FOR THE VERY BASICS LIKE FOOD, HOUSING, MEDICAL CARE, AND HEATING?: NOT VERY HARD

## 2024-12-11 NOTE — ASSESSMENT & PLAN NOTE
Declines med change or seeing psych, will agree to walk daily    Orders:    escitalopram (LEXAPRO) 20 MG tablet; Take 1 tablet by mouth daily    AMB POC URINALYSIS DIP STICK MANUAL W/O MICRO    CBC with Auto Differential; Future    Magnesium; Future    TSH with Reflex; Future    Comprehensive Metabolic Panel; Future    Comprehensive Metabolic Panel    TSH with Reflex    Magnesium    CBC with Auto Differential

## 2024-12-11 NOTE — ASSESSMENT & PLAN NOTE
F/u with neurology this month, reviewed MRI     Orders:    escitalopram (LEXAPRO) 20 MG tablet; Take 1 tablet by mouth daily

## 2024-12-11 NOTE — PROGRESS NOTES
his puffy face after sleeping 15 hours  Check his labs  Discussed his sleeping and depression  Try daily walking  Continue lexapro  No SI/HI    Subjective   Patient is here for concerns from his wife of sleepiness. He wakes up puffy in the morning. The left eye is more puffy than the right. He sleeps until 11-12 in the morning.   He sleeps face down or on his stomach. He is on cpap. He goes to bed 9pm and sleeps until 11-12 the next day. That is pretty normal sleep schedule for him - the last few years.     Sleep Problem  This is a chronic problem. The current episode started more than 1 year ago. The problem has been unchanged. Pertinent negatives include no fatigue.       Review of Systems   Constitutional:  Negative for fatigue.          Objective   Physical Exam  Vitals reviewed.   Constitutional:       Appearance: Normal appearance.   HENT:      Head: Normocephalic.      Right Ear: Tympanic membrane normal.      Left Ear: Tympanic membrane normal.   Eyes:      Extraocular Movements: Extraocular movements intact.      Pupils: Pupils are equal, round, and reactive to light.   Cardiovascular:      Rate and Rhythm: Normal rate and regular rhythm.   Pulmonary:      Effort: Pulmonary effort is normal.      Breath sounds: No wheezing.   Musculoskeletal:      Cervical back: Neck supple.   Skin:     General: Skin is warm and dry.   Neurological:      Mental Status: He is alert and oriented to person, place, and time. Mental status is at baseline.                  An electronic signature was used to authenticate this note.    --Lesa Rojo, APRN - CNP

## 2024-12-11 NOTE — ASSESSMENT & PLAN NOTE
Bp controlled with lisinopril    Orders:    lisinopril (PRINIVIL;ZESTRIL) 5 MG tablet; Take 1 tablet by mouth daily    AMB POC URINALYSIS DIP STICK MANUAL W/O MICRO    CBC with Auto Differential; Future    Magnesium; Future    TSH with Reflex; Future    Comprehensive Metabolic Panel; Future    Comprehensive Metabolic Panel    TSH with Reflex    Magnesium    CBC with Auto Differential

## 2024-12-12 DIAGNOSIS — E83.52 HYPERCALCEMIA: Primary | ICD-10-CM

## 2024-12-13 LAB
BACTERIA SPEC CULT: ABNORMAL
BACTERIA SPEC CULT: ABNORMAL
SERVICE CMNT-IMP: ABNORMAL

## 2024-12-31 ENCOUNTER — OFFICE VISIT (OUTPATIENT)
Dept: INTERNAL MEDICINE CLINIC | Facility: CLINIC | Age: 79
End: 2024-12-31
Payer: MEDICARE

## 2024-12-31 VITALS
WEIGHT: 185 LBS | DIASTOLIC BLOOD PRESSURE: 72 MMHG | BODY MASS INDEX: 29.03 KG/M2 | SYSTOLIC BLOOD PRESSURE: 120 MMHG | HEIGHT: 67 IN | TEMPERATURE: 99 F | HEART RATE: 64 BPM | OXYGEN SATURATION: 94 %

## 2024-12-31 DIAGNOSIS — H61.23 BILATERAL IMPACTED CERUMEN: ICD-10-CM

## 2024-12-31 DIAGNOSIS — E83.52 HYPERCALCEMIA: Primary | ICD-10-CM

## 2024-12-31 LAB
ANION GAP SERPL CALC-SCNC: 16 MMOL/L (ref 7–16)
BUN SERPL-MCNC: 16 MG/DL (ref 8–23)
CALCIUM SERPL-MCNC: 9.1 MG/DL (ref 8.8–10.2)
CHLORIDE SERPL-SCNC: 100 MMOL/L (ref 98–107)
CO2 SERPL-SCNC: 25 MMOL/L (ref 20–29)
CREAT SERPL-MCNC: 1.01 MG/DL (ref 0.8–1.3)
GLUCOSE SERPL-MCNC: 82 MG/DL (ref 70–99)
POTASSIUM SERPL-SCNC: 4.2 MMOL/L (ref 3.5–5.1)
SODIUM SERPL-SCNC: 141 MMOL/L (ref 136–145)

## 2024-12-31 PROCEDURE — 99213 OFFICE O/P EST LOW 20 MIN: CPT | Performed by: INTERNAL MEDICINE

## 2024-12-31 PROCEDURE — 1159F MED LIST DOCD IN RCRD: CPT | Performed by: INTERNAL MEDICINE

## 2024-12-31 PROCEDURE — 3074F SYST BP LT 130 MM HG: CPT | Performed by: INTERNAL MEDICINE

## 2024-12-31 PROCEDURE — 3078F DIAST BP <80 MM HG: CPT | Performed by: INTERNAL MEDICINE

## 2024-12-31 PROCEDURE — 1123F ACP DISCUSS/DSCN MKR DOCD: CPT | Performed by: INTERNAL MEDICINE

## 2024-12-31 NOTE — PROGRESS NOTES
Boris Collins (: 1945) is a 79 y.o. male, here for evaluation of the following chief complaint(s):  Cerumen Impaction, Other (Also needs lab checked), and Wound Check (Sore on nose from CPAP)     Assessment & Plan  1. Cerumen impaction.  The presence of cerumen in the right ear was noted, but it is not causing any obstruction. He was advised to seek professional cleaning of his hearing aids at [facility]. If this intervention proves ineffective, a subsequent visit for ear irrigation will be necessary.    2. Nasal irritation.  The irritation is likely due to the use of a CPAP mask, which is causing discomfort by rubbing against his nose. He was advised to apply an ointment to the affected area to alleviate the irritation.    3. Elevated calcium levels.  A re-evaluation of his calcium levels will be conducted through a blood test.  1. Hypercalcemia  -     PTH, intact and calcium  -     Calcium, Ionized; Future  2. Bilateral impacted cerumen  Comments:  cleared brandy tavarez,not blocking canal    History of Present Illness  The patient is an 79-year-old male who presents for evaluation of cerumen impaction, nasal irritation, and elevated calcium levels.    He reports a sensation of fullness in his ears, which he attributes to cerumen accumulation. He has been experiencing difficulties with his hearing aids, which were previously serviced at [facility].    He also notes irritation on his nose, which he believes is due to the pressure exerted by his CPAP mask during sleep. He identifies as a stomach sleeper, which results in the mask pressing against his nose.    His calcium levels were found to be elevated during a recent blood work. Consequently, he has temporarily discontinued his vitamin D and calcium supplements.    MEDICATIONS  Discontinued: vitamin D, calcium    Social History     Tobacco Use    Smoking status: Never     Passive exposure: Never    Smokeless tobacco: Former    Tobacco comments:     Quit

## 2025-01-01 ENCOUNTER — TELEPHONE (OUTPATIENT)
Dept: NEUROLOGY | Age: 80
End: 2025-01-01

## 2025-01-02 LAB
CALCIUM SERPL-MCNC: 9.2 MG/DL (ref 8.6–10.2)
PTH-INTACT SERPL-MCNC: NORMAL PG/ML (ref 15–65)

## 2025-01-21 ENCOUNTER — OFFICE VISIT (OUTPATIENT)
Dept: NEUROLOGY | Age: 80
End: 2025-01-21
Payer: MEDICARE

## 2025-01-21 VITALS
WEIGHT: 186 LBS | HEART RATE: 76 BPM | DIASTOLIC BLOOD PRESSURE: 89 MMHG | SYSTOLIC BLOOD PRESSURE: 153 MMHG | OXYGEN SATURATION: 97 % | HEIGHT: 67 IN | BODY MASS INDEX: 29.19 KG/M2

## 2025-01-21 DIAGNOSIS — F32.1 MAJOR DEPRESSIVE DISORDER, SINGLE EPISODE, MODERATE (HCC): ICD-10-CM

## 2025-01-21 DIAGNOSIS — R26.81 UNSTEADY GAIT: ICD-10-CM

## 2025-01-21 DIAGNOSIS — F01.53 VASCULAR DEMENTIA WITH DEPRESSED MOOD (HCC): Primary | ICD-10-CM

## 2025-01-21 DIAGNOSIS — Z91.81 HISTORY OF RECENT FALL: ICD-10-CM

## 2025-01-21 DIAGNOSIS — G47.33 OSA (OBSTRUCTIVE SLEEP APNEA): ICD-10-CM

## 2025-01-21 DIAGNOSIS — Z86.73 HISTORY OF STROKE: ICD-10-CM

## 2025-01-21 PROCEDURE — 3077F SYST BP >= 140 MM HG: CPT | Performed by: NURSE PRACTITIONER

## 2025-01-21 PROCEDURE — 3079F DIAST BP 80-89 MM HG: CPT | Performed by: NURSE PRACTITIONER

## 2025-01-21 PROCEDURE — 1123F ACP DISCUSS/DSCN MKR DOCD: CPT | Performed by: NURSE PRACTITIONER

## 2025-01-21 PROCEDURE — 1159F MED LIST DOCD IN RCRD: CPT | Performed by: NURSE PRACTITIONER

## 2025-01-21 PROCEDURE — 99214 OFFICE O/P EST MOD 30 MIN: CPT | Performed by: NURSE PRACTITIONER

## 2025-01-21 PROCEDURE — 1126F AMNT PAIN NOTED NONE PRSNT: CPT | Performed by: NURSE PRACTITIONER

## 2025-01-21 PROCEDURE — 1160F RVW MEDS BY RX/DR IN RCRD: CPT | Performed by: NURSE PRACTITIONER

## 2025-01-21 RX ORDER — DONEPEZIL HYDROCHLORIDE 10 MG/1
10 TABLET, FILM COATED ORAL NIGHTLY
Qty: 90 TABLET | Refills: 1 | Status: SHIPPED | OUTPATIENT
Start: 2025-02-21

## 2025-01-21 RX ORDER — DONEPEZIL HYDROCHLORIDE 5 MG/1
5 TABLET, FILM COATED ORAL NIGHTLY
Qty: 30 TABLET | Refills: 0 | Status: SHIPPED | OUTPATIENT
Start: 2025-01-21

## 2025-01-21 ASSESSMENT — ENCOUNTER SYMPTOMS
VOICE CHANGE: 0
BACK PAIN: 1
TROUBLE SWALLOWING: 1
APNEA: 1
ALLERGIC/IMMUNOLOGIC NEGATIVE: 1
EYES NEGATIVE: 1
GASTROINTESTINAL NEGATIVE: 1

## 2025-01-21 ASSESSMENT — PATIENT HEALTH QUESTIONNAIRE - PHQ9
2. FEELING DOWN, DEPRESSED OR HOPELESS: NOT AT ALL
SUM OF ALL RESPONSES TO PHQ QUESTIONS 1-9: 0
SUM OF ALL RESPONSES TO PHQ9 QUESTIONS 1 & 2: 0
1. LITTLE INTEREST OR PLEASURE IN DOING THINGS: NOT AT ALL

## 2025-01-21 NOTE — PATIENT INSTRUCTIONS
Plan:     Interventions that may decrease conversion from mild cognitive impairment to dementia or aid in the treatment of established dementia:   Reduce damage the brain by controlling vascular risk factors (SBP < 140 mmHg, normal cholesterol, BMI < 31 kg/m²).   2. Exercise 20 minutes three times per week reaching 80% maximum heart rate   3. Diet: adhere to a Mediterranean diet     Symptomatic management   Cholinesterase inhibitors to help with memory and attention. These medications can cause nausea, vomiting, diarrhea, syncope, bradycardia, and may increase the risk of falls. After counseling we have decided to start the patient on donepezil 5 mg daily for 30 days with plan to titrate to 10 mg daily. If the above mentioned symptoms occur then we can try a rivastigmine patch. For moderate to severe dementia memantine can be added.   Cognitive enrichment by taking part in social activities and support groups.

## 2025-01-21 NOTE — PROGRESS NOTES
week reaching 80% maximum heart rate   3. Diet: adhere to a Mediterranean diet     Symptomatic management   Cholinesterase inhibitors to help with memory and attention. These medications can cause nausea, vomiting, diarrhea, syncope, bradycardia, and may increase the risk of falls. After counseling we have decided to start the patient on donepezil 5 mg daily for 30 days with plan to titrate to 10 mg daily. If the above mentioned symptoms occur then we can try a rivastigmine patch. For moderate to severe dementia memantine can be added.   Cognitive enrichment by taking part in social activities and support groups.   For delirium and sundowning: cholinesterase inhibitor and melatonin can be helpful. For the prevention of delirium, re-orientation, daily routine, a normal sleep wake cycle, and socialization are most important. Benzodiazepines and anticholinergic medications should be avoided as these are frequent causes of delirium. If all measures have failed and the patient is a danger to self or others then certain atypical antipsychotics can be used as low doses (quetiapine 25 mg at night).   Depression: depression is very common in dementia. SSRIs are preferred (i.e. Citalopram,escitalopram, etc.). If depression coexists with apathy then I recommend bupropion.  Sleep: I counseled the patient on sleep hygiene, including keeping the lights off at night, TV off, and the benefits of herbal tea, etc. Melatonin, trazodone, and mirtazapine are helpful medications. I recommend against the use of sedative hypnotics.   Fatigue: amantadine and modafinil may be mildly effective.   Agitation and aggression: attempt to resolve with communication. Always introduce the patient to everyone in the room and make eye contact with the patient. Use a calm and friendly voice. Certain SSRIs may be helpful     Prognosis: pneumonia and stroke are common causes of death in dementia. The rate of cognitive decline is about the same

## 2025-01-31 ENCOUNTER — TELEPHONE (OUTPATIENT)
Dept: INTERNAL MEDICINE CLINIC | Facility: CLINIC | Age: 80
End: 2025-01-31

## 2025-01-31 NOTE — TELEPHONE ENCOUNTER
[facility] and states that she went out to see the patient and his heart rate was between 43 and 46. Flora states that his blood pressure was fine and he did not have any dizziness. Flora states that the patient refused to go to the hospital. Please Advise.

## 2025-02-04 NOTE — TELEPHONE ENCOUNTER
Called and talked with Flora. She is schedule to F/U and see him tomorrow. She will let us know if there is anything else we need to do. Make sure staying hydrated. F/U with Dr Bert zelaya.

## 2025-02-13 DIAGNOSIS — F01.53 VASCULAR DEMENTIA WITH DEPRESSED MOOD (HCC): ICD-10-CM

## 2025-02-18 ENCOUNTER — TELEPHONE (OUTPATIENT)
Dept: NEUROLOGY | Age: 80
End: 2025-02-18

## 2025-02-18 NOTE — TELEPHONE ENCOUNTER
Patients wife called stating patient has been taking Donepezil 5mg with little improvement.     Patient has not tried the 10mg tab.    Wife's question is will patient remain on the 5mg or start the 10mg.     Patient will need a refill on the 5mg if he remains on this medication.    Please advise patient.

## 2025-02-20 RX ORDER — DONEPEZIL HYDROCHLORIDE 5 MG/1
5 TABLET, FILM COATED ORAL NIGHTLY
Qty: 90 TABLET | Refills: 1 | OUTPATIENT
Start: 2025-02-20

## 2025-02-21 ENCOUNTER — PATIENT MESSAGE (OUTPATIENT)
Dept: NEUROLOGY | Age: 80
End: 2025-02-21

## 2025-02-21 NOTE — TELEPHONE ENCOUNTER
Patient is to take donepezil 5 mg nightly for 30 days, then increase to 10 mg if able to tolerate medication without side effects.

## 2025-03-01 ENCOUNTER — TELEPHONE (OUTPATIENT)
Dept: INTERNAL MEDICINE CLINIC | Facility: CLINIC | Age: 80
End: 2025-03-01

## 2025-03-01 NOTE — TELEPHONE ENCOUNTER
Dandre Thompson called to report patient found dead on commode.  Declared natural causes and is assigning death certificate to PCP

## (undated) DEVICE — STRYKER PERFORMANCE SERIES SAGITTAL BLADE: Brand: STRYKER PERFORMANCE SERIES

## (undated) DEVICE — SUTURE VCRL + SZ 1-0 L36IN ABSRB UD CTX 1/2 CIR TAPR PNT VCP977H

## (undated) DEVICE — KIT DRP FOR RIO ROBOTIC ARM ASST SYS

## (undated) DEVICE — SPONGE LAPAROTOMY W18XL18IN WHITE STRUNG RADIOPAQUE STERILE

## (undated) DEVICE — SOLUTION IV 250ML 0.9% SOD CHL PH 5 INJ USP VIAFLX PLAS

## (undated) DEVICE — CURETTE SURG FOR BNE CEM REM QUIK USE

## (undated) DEVICE — DRAPE,TOP,102X53,STERILE: Brand: MEDLINE

## (undated) DEVICE — GUIDEPIN ORTHOPEDIC NAVIGATION 4X140 MM 2P SCREW STRL

## (undated) DEVICE — SYR 3ML LL TIP 1/10ML GRAD --

## (undated) DEVICE — SOLUTION IRRIG 3000ML 0.9% SOD CHL USP UROMATIC PLAS CONT

## (undated) DEVICE — KIT TRK KNEE PROC VIZADISC

## (undated) DEVICE — CANNULA NSL ORAL AD FOR CAPNOFLEX CO2 O2 AIRLFE

## (undated) DEVICE — SYR 5ML 1/5 GRAD LL NSAF LF --

## (undated) DEVICE — CONNECTOR TBNG OD5-7MM O2 END DISP

## (undated) DEVICE — GUIDEPIN ORTHOPEDIC NAVIGATION 4X110 MM 2P SCREW STRL

## (undated) DEVICE — BLADE SURG SAW STD S STL OSC W/ SERR EDGE DISP

## (undated) DEVICE — BIPOLAR SEALER 23-112-1 AQM 6.0: Brand: AQUAMANTYS ®

## (undated) DEVICE — KENDALL RADIOLUCENT FOAM MONITORING ELECTRODE RECTANGULAR SHAPE: Brand: KENDALL

## (undated) DEVICE — KIT INT FIX FEM TIB CKPT MAKOPLASTY

## (undated) DEVICE — HOOD: Brand: FLYTE

## (undated) DEVICE — TOTAL KNEE DR KAVOLUS: Brand: MEDLINE INDUSTRIES, INC.

## (undated) DEVICE — RECIPROCATING BLADE DOUBLE SIDE (74.0 X 0.77MM)

## (undated) DEVICE — SOLUTION IRRIG 1000ML 0.9% SOD CHL USP POUR PLAS BTL

## (undated) DEVICE — GOWN,REINF,POLY,ECL,PP SLV,XL: Brand: MEDLINE

## (undated) DEVICE — BLOCK BITE AD 60FR W/ VELC STRP ADDRESSES MOST PT AND

## (undated) DEVICE — SUTURE PDS II SZ 1 L96IN ABSRB VLT TP-1 L65MM 1/2 CIR Z880G

## (undated) DEVICE — SUTURE MCRYL SZ 2-0 L27IN ABSRB UD CP-1 1 L36MM 1/2 CIR REV Y266H

## (undated) DEVICE — STERILE PVP: Brand: MEDLINE INDUSTRIES, INC.

## (undated) DEVICE — MEDI-VAC YANKAUER SUCTION HANDLE W/BULBOUS TIP: Brand: CARDINAL HEALTH

## (undated) DEVICE — YANKAUER,FLEXIBLE HANDLE,REGLR CAPACITY: Brand: MEDLINE INDUSTRIES, INC.

## (undated) DEVICE — NDL PRT INJ NSAF BLNT 18GX1.5 --

## (undated) DEVICE — DRESSING HYDROFIBER AQUACEL AG ADVANTAGE 3.5X12 IN

## (undated) DEVICE — STERILE PRESSURE PROTECTOR PAD® FOR DE MAYO UNIVERSAL DISTRACTOR® (10/CASE): Brand: DE MAYO UNIVERSAL DISTRACTOR®

## (undated) DEVICE — 1200 GUARD II KIT W/5MM TUBE W/O VAC TUBE: Brand: GUARDIAN